# Patient Record
Sex: FEMALE | Race: WHITE | Employment: UNEMPLOYED | URBAN - METROPOLITAN AREA
[De-identification: names, ages, dates, MRNs, and addresses within clinical notes are randomized per-mention and may not be internally consistent; named-entity substitution may affect disease eponyms.]

---
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
## 2019-01-03 ENCOUNTER — OFFICE VISIT (OUTPATIENT)
Dept: FAMILY MEDICINE CLINIC | Age: 19
End: 2019-01-03
Payer: MEDICARE

## 2019-01-03 VITALS
WEIGHT: 182.2 LBS | TEMPERATURE: 98.3 F | HEART RATE: 103 BPM | HEIGHT: 62 IN | BODY MASS INDEX: 33.53 KG/M2 | SYSTOLIC BLOOD PRESSURE: 109 MMHG | OXYGEN SATURATION: 97 % | DIASTOLIC BLOOD PRESSURE: 75 MMHG

## 2019-01-03 DIAGNOSIS — Z11.4 ENCOUNTER FOR SCREENING FOR HIV: ICD-10-CM

## 2019-01-03 DIAGNOSIS — E66.9 OBESITY (BMI 30.0-34.9): ICD-10-CM

## 2019-01-03 DIAGNOSIS — J45.40 MODERATE PERSISTENT ASTHMA WITHOUT COMPLICATION: ICD-10-CM

## 2019-01-03 DIAGNOSIS — R63.5 UNINTENDED WEIGHT GAIN: ICD-10-CM

## 2019-01-03 DIAGNOSIS — R73.9 HYPERGLYCEMIA: ICD-10-CM

## 2019-01-03 DIAGNOSIS — R53.82 CHRONIC FATIGUE: Primary | ICD-10-CM

## 2019-01-03 DIAGNOSIS — F33.1 MODERATE EPISODE OF RECURRENT MAJOR DEPRESSIVE DISORDER (HCC): ICD-10-CM

## 2019-01-03 DIAGNOSIS — Z13.31 POSITIVE DEPRESSION SCREENING: ICD-10-CM

## 2019-01-03 LAB — HBA1C MFR BLD: 5 %

## 2019-01-03 PROCEDURE — G8427 DOCREV CUR MEDS BY ELIG CLIN: HCPCS | Performed by: FAMILY MEDICINE

## 2019-01-03 PROCEDURE — 99204 OFFICE O/P NEW MOD 45 MIN: CPT | Performed by: FAMILY MEDICINE

## 2019-01-03 PROCEDURE — 96160 PT-FOCUSED HLTH RISK ASSMT: CPT | Performed by: FAMILY MEDICINE

## 2019-01-03 PROCEDURE — 4004F PT TOBACCO SCREEN RCVD TLK: CPT | Performed by: FAMILY MEDICINE

## 2019-01-03 PROCEDURE — 83036 HEMOGLOBIN GLYCOSYLATED A1C: CPT | Performed by: FAMILY MEDICINE

## 2019-01-03 PROCEDURE — G8484 FLU IMMUNIZE NO ADMIN: HCPCS | Performed by: FAMILY MEDICINE

## 2019-01-03 PROCEDURE — G8431 POS CLIN DEPRES SCRN F/U DOC: HCPCS | Performed by: FAMILY MEDICINE

## 2019-01-03 PROCEDURE — G8417 CALC BMI ABV UP PARAM F/U: HCPCS | Performed by: FAMILY MEDICINE

## 2019-01-03 RX ORDER — FLUOXETINE 10 MG/1
10 TABLET, FILM COATED ORAL EVERY MORNING
Qty: 30 TABLET | Refills: 1 | Status: SHIPPED | OUTPATIENT
Start: 2019-01-03 | End: 2019-11-06

## 2019-01-03 RX ORDER — LANOLIN ALCOHOL/MO/W.PET/CERES
3-6 CREAM (GRAM) TOPICAL NIGHTLY PRN
Qty: 60 TABLET | Refills: 1 | Status: SHIPPED | OUTPATIENT
Start: 2019-01-03 | End: 2019-11-06

## 2019-01-03 RX ORDER — FLUTICASONE PROPIONATE 110 UG/1
2 AEROSOL, METERED RESPIRATORY (INHALATION) 2 TIMES DAILY
Qty: 1 INHALER | Refills: 3 | Status: SHIPPED | OUTPATIENT
Start: 2019-01-03 | End: 2021-11-17 | Stop reason: ALTCHOICE

## 2019-01-03 RX ORDER — ALBUTEROL SULFATE 90 UG/1
2 AEROSOL, METERED RESPIRATORY (INHALATION) EVERY 6 HOURS PRN
Qty: 8 G | Refills: 3 | Status: ON HOLD | OUTPATIENT
Start: 2019-01-03 | End: 2020-12-16 | Stop reason: ALTCHOICE

## 2019-01-03 ASSESSMENT — PATIENT HEALTH QUESTIONNAIRE - PHQ9
10. IF YOU CHECKED OFF ANY PROBLEMS, HOW DIFFICULT HAVE THESE PROBLEMS MADE IT FOR YOU TO DO YOUR WORK, TAKE CARE OF THINGS AT HOME, OR GET ALONG WITH OTHER PEOPLE: 2
6. FEELING BAD ABOUT YOURSELF - OR THAT YOU ARE A FAILURE OR HAVE LET YOURSELF OR YOUR FAMILY DOWN: 1
SUM OF ALL RESPONSES TO PHQ QUESTIONS 1-9: 9
3. TROUBLE FALLING OR STAYING ASLEEP: 2
SUM OF ALL RESPONSES TO PHQ9 QUESTIONS 1 & 2: 3
7. TROUBLE CONCENTRATING ON THINGS, SUCH AS READING THE NEWSPAPER OR WATCHING TELEVISION: 1
SUM OF ALL RESPONSES TO PHQ QUESTIONS 1-9: 9
9. THOUGHTS THAT YOU WOULD BE BETTER OFF DEAD, OR OF HURTING YOURSELF: 0
2. FEELING DOWN, DEPRESSED OR HOPELESS: 1
8. MOVING OR SPEAKING SO SLOWLY THAT OTHER PEOPLE COULD HAVE NOTICED. OR THE OPPOSITE, BEING SO FIGETY OR RESTLESS THAT YOU HAVE BEEN MOVING AROUND A LOT MORE THAN USUAL: 0
4. FEELING TIRED OR HAVING LITTLE ENERGY: 1
1. LITTLE INTEREST OR PLEASURE IN DOING THINGS: 2
5. POOR APPETITE OR OVEREATING: 1

## 2019-01-03 ASSESSMENT — ENCOUNTER SYMPTOMS
SHORTNESS OF BREATH: 1
DIARRHEA: 0
COUGH: 1
CHEST TIGHTNESS: 0
ABDOMINAL PAIN: 0
WHEEZING: 0
VOMITING: 0
NAUSEA: 0
CONSTIPATION: 0
ABDOMINAL DISTENTION: 0

## 2019-01-03 ASSESSMENT — ASTHMA QUESTIONNAIRES
QUESTION_2 LAST FOUR WEEKS HOW OFTEN HAVE YOU HAD SHORTNESS OF BREATH: 3
QUESTION_4 LAST FOUR WEEKS HOW OFTEN HAVE YOU USED YOUR RESCUE INHALER OR NEBULIZER MEDICATION (SUCH AS ALBUTEROL): 5
QUESTION_1 LAST FOUR WEEKS HOW MUCH OF THE TIME DID YOUR ASTHMA KEEP YOU FROM GETTING AS MUCH DONE AT WORK, SCHOOL OR AT HOME: 2
QUESTION_3 LAST FOUR WEEKS HOW OFTEN DID YOUR ASTHMA SYMPTOMS (WHEEZING, COUGHING, SHORTNESS OF BREATH, CHEST TIGHTNESS OR PAIN) WAKE YOU UP AT NIGHT OR EARLIER THAN USUAL IN THE MORNING: 1
ACT_TOTALSCORE: 13
QUESTION_5 LAST FOUR WEEKS HOW WOULD YOU RATE YOUR ASTHMA CONTROL: 2

## 2019-01-07 PROBLEM — J45.40 MODERATE PERSISTENT ASTHMA WITHOUT COMPLICATION: Status: ACTIVE | Noted: 2019-01-07

## 2019-01-07 PROBLEM — E66.811 OBESITY (BMI 30.0-34.9): Status: ACTIVE | Noted: 2019-01-07

## 2019-01-07 PROBLEM — E66.9 OBESITY (BMI 30.0-34.9): Status: ACTIVE | Noted: 2019-01-07

## 2019-01-07 PROBLEM — F33.1 MODERATE EPISODE OF RECURRENT MAJOR DEPRESSIVE DISORDER (HCC): Status: ACTIVE | Noted: 2019-01-07

## 2019-01-07 PROBLEM — R73.9 HYPERGLYCEMIA: Status: ACTIVE | Noted: 2019-01-07

## 2019-01-07 PROBLEM — R63.5 UNINTENDED WEIGHT GAIN: Status: ACTIVE | Noted: 2019-01-07

## 2019-01-07 PROBLEM — R53.82 CHRONIC FATIGUE: Status: ACTIVE | Noted: 2019-01-07

## 2019-01-07 ASSESSMENT — ENCOUNTER SYMPTOMS
SINUS PRESSURE: 0
SINUS PAIN: 0
RHINORRHEA: 0
BACK PAIN: 0

## 2019-09-05 ENCOUNTER — TELEPHONE (OUTPATIENT)
Dept: OBGYN CLINIC | Age: 19
End: 2019-09-05

## 2019-10-28 ENCOUNTER — HOSPITAL ENCOUNTER (OUTPATIENT)
Age: 19
Discharge: HOME | End: 2019-10-28
Payer: MEDICARE

## 2019-10-28 PROCEDURE — G0480 DRUG TEST DEF 1-7 CLASSES: HCPCS

## 2019-10-31 LAB
3-OH-COTININE URINE: <50 NG/ML
ANABASINE URINE: <3 NG/ML
COTININE, URINE: <5 NG/ML
NICOTINE URINE: <2 NG/ML
NORNICOTINE URINE: <2 NG/ML

## 2019-11-06 ENCOUNTER — OFFICE VISIT (OUTPATIENT)
Dept: OBGYN CLINIC | Age: 19
End: 2019-11-06
Payer: MEDICARE

## 2019-11-06 ENCOUNTER — HOSPITAL ENCOUNTER (OUTPATIENT)
Age: 19
Setting detail: SPECIMEN
Discharge: HOME | End: 2019-11-06
Payer: MEDICARE

## 2019-11-06 VITALS
RESPIRATION RATE: 16 BRPM | HEIGHT: 60 IN | SYSTOLIC BLOOD PRESSURE: 114 MMHG | WEIGHT: 181 LBS | DIASTOLIC BLOOD PRESSURE: 72 MMHG | BODY MASS INDEX: 35.53 KG/M2

## 2019-11-06 DIAGNOSIS — N94.6 DYSMENORRHEA: ICD-10-CM

## 2019-11-06 DIAGNOSIS — Z01.419 WOMEN'S ANNUAL ROUTINE GYNECOLOGICAL EXAMINATION: Primary | ICD-10-CM

## 2019-11-06 PROCEDURE — G8484 FLU IMMUNIZE NO ADMIN: HCPCS | Performed by: NURSE PRACTITIONER

## 2019-11-06 PROCEDURE — 99395 PREV VISIT EST AGE 18-39: CPT | Performed by: NURSE PRACTITIONER

## 2019-11-06 RX ORDER — DROSPIRENONE, ETHINYL ESTRADIOL AND LEVOMEFOLATE CALCIUM AND LEVOMEFOLATE CALCIUM 3-0.02(24)
1 KIT ORAL DAILY
Qty: 28 TABLET | Refills: 3 | Status: CANCELLED | OUTPATIENT
Start: 2019-11-06

## 2019-11-06 RX ORDER — LEVONORGESTREL AND ETHINYL ESTRADIOL 0.15-0.03
1 KIT ORAL DAILY
Qty: 91 TABLET | Refills: 1 | Status: SHIPPED | OUTPATIENT
Start: 2019-11-06 | End: 2019-11-07

## 2019-11-06 ASSESSMENT — ENCOUNTER SYMPTOMS
SHORTNESS OF BREATH: 0
ABDOMINAL PAIN: 0
DIARRHEA: 0
VOMITING: 0
BACK PAIN: 0
COUGH: 0
RHINORRHEA: 0
NAUSEA: 0
COLOR CHANGE: 0
CONSTIPATION: 0

## 2019-11-07 DIAGNOSIS — N94.6 DYSMENORRHEA: Primary | ICD-10-CM

## 2019-11-07 RX ORDER — LEVONORGESTREL AND ETHINYL ESTRADIOL 0.15-0.03
1 KIT ORAL DAILY
Qty: 1 PACKET | Refills: 1 | Status: SHIPPED | OUTPATIENT
Start: 2019-11-07 | End: 2020-10-05 | Stop reason: SDUPTHER

## 2019-11-11 PROBLEM — R87.612 LGSIL ON PAP SMEAR OF CERVIX: Status: ACTIVE | Noted: 2019-11-11

## 2019-11-11 LAB — CYTOLOGY REPORT: NORMAL

## 2019-11-12 ENCOUNTER — TELEPHONE (OUTPATIENT)
Dept: OBGYN CLINIC | Age: 19
End: 2019-11-12

## 2019-11-21 ENCOUNTER — TELEPHONE (OUTPATIENT)
Dept: OBGYN CLINIC | Age: 19
End: 2019-11-21

## 2019-11-29 ENCOUNTER — TELEPHONE (OUTPATIENT)
Dept: OBGYN CLINIC | Age: 19
End: 2019-11-29

## 2020-02-10 ENCOUNTER — HOSPITAL ENCOUNTER (EMERGENCY)
Age: 20
Discharge: HOME | End: 2020-02-10
Attending: EMERGENCY MEDICINE
Payer: MEDICARE

## 2020-02-10 ENCOUNTER — APPOINTMENT (OUTPATIENT)
Dept: CT IMAGING | Age: 20
End: 2020-02-10
Payer: MEDICARE

## 2020-02-10 VITALS
TEMPERATURE: 97.4 F | OXYGEN SATURATION: 100 % | SYSTOLIC BLOOD PRESSURE: 129 MMHG | BODY MASS INDEX: 34.41 KG/M2 | HEART RATE: 100 BPM | DIASTOLIC BLOOD PRESSURE: 90 MMHG | HEIGHT: 62 IN | WEIGHT: 187 LBS | RESPIRATION RATE: 20 BRPM

## 2020-02-10 LAB
ABSOLUTE EOS #: 0.12 K/UL (ref 0–0.44)
ABSOLUTE IMMATURE GRANULOCYTE: 0.03 K/UL (ref 0–0.3)
ABSOLUTE LYMPH #: 3.92 K/UL (ref 1.2–5.2)
ABSOLUTE MONO #: 0.56 K/UL (ref 0.1–1.4)
ANION GAP SERPL CALCULATED.3IONS-SCNC: 14 MMOL/L (ref 9–17)
APPEARANCE: CLEAR
BASOPHILS # BLD: 0 % (ref 0–2)
BASOPHILS ABSOLUTE: 0.03 K/UL (ref 0–0.2)
BILIRUBIN, POC: NEGATIVE
BLOOD URINE, POC: NEGATIVE
BUN BLDV-MCNC: 8 MG/DL (ref 6–20)
BUN/CREAT BLD: 14 (ref 9–20)
CALCIUM SERPL-MCNC: 9 MG/DL (ref 8.6–10.4)
CHLORIDE BLD-SCNC: 102 MMOL/L (ref 98–107)
CHP ED QC CHECK: YES
CHP ED QC CHECK: YES
CLARITY, POC: CLEAR
CO2: 21 MMOL/L (ref 20–31)
COLOR, POC: YELLOW
CREAT SERPL-MCNC: 0.59 MG/DL (ref 0.5–0.9)
DIFFERENTIAL TYPE: NORMAL
EOSINOPHILS RELATIVE PERCENT: 1 % (ref 1–4)
GFR AFRICAN AMERICAN: >60 ML/MIN
GFR NON-AFRICAN AMERICAN: >60 ML/MIN
GFR SERPL CREATININE-BSD FRML MDRD: NORMAL ML/MIN/{1.73_M2}
GFR SERPL CREATININE-BSD FRML MDRD: NORMAL ML/MIN/{1.73_M2}
GLUCOSE BLD-MCNC: 75 MG/DL (ref 70–99)
GLUCOSE URINE, POC: NEGATIVE
HCT VFR BLD CALC: 39.7 % (ref 36.3–47.1)
HEMOGLOBIN: 12.7 G/DL (ref 11.9–15.1)
IMMATURE GRANULOCYTES: 0 %
KETONES, POC: NEGATIVE
LEUKOCYTE EST, POC: NORMAL
LYMPHOCYTES # BLD: 38 % (ref 25–45)
MCH RBC QN AUTO: 27.7 PG (ref 25.2–33.5)
MCHC RBC AUTO-ENTMCNC: 32 G/DL (ref 28.4–34.8)
MCV RBC AUTO: 86.7 FL (ref 82.6–102.9)
MONOCYTES # BLD: 5 % (ref 2–8)
NITRITE, POC: NEGATIVE
NRBC AUTOMATED: 0 PER 100 WBC
PDW BLD-RTO: 13.1 % (ref 11.8–14.4)
PH, POC: 7
PLATELET # BLD: 307 K/UL (ref 138–453)
PLATELET ESTIMATE: NORMAL
PMV BLD AUTO: 11.1 FL (ref 8.1–13.5)
POTASSIUM SERPL-SCNC: 3.7 MMOL/L (ref 3.7–5.3)
PREGNANCY TEST URINE, POC: NEGATIVE
PROTEIN, POC: NEGATIVE
RBC # BLD: 4.58 M/UL (ref 3.95–5.11)
RBC # BLD: NORMAL 10*6/UL
SEG NEUTROPHILS: 56 % (ref 34–64)
SEGMENTED NEUTROPHILS ABSOLUTE COUNT: 5.63 K/UL (ref 1.8–8)
SODIUM BLD-SCNC: 137 MMOL/L (ref 135–144)
SPECIFIC GRAVITY, POC: 1.02
UROBILINOGEN, POC: 0.2
WBC # BLD: 10.3 K/UL (ref 4.5–13.5)
WBC # BLD: NORMAL 10*3/UL

## 2020-02-10 PROCEDURE — 81003 URINALYSIS AUTO W/O SCOPE: CPT

## 2020-02-10 PROCEDURE — 80048 BASIC METABOLIC PNL TOTAL CA: CPT

## 2020-02-10 PROCEDURE — 96374 THER/PROPH/DIAG INJ IV PUSH: CPT

## 2020-02-10 PROCEDURE — 6370000000 HC RX 637 (ALT 250 FOR IP): Performed by: EMERGENCY MEDICINE

## 2020-02-10 PROCEDURE — 81025 URINE PREGNANCY TEST: CPT

## 2020-02-10 PROCEDURE — 85025 COMPLETE CBC W/AUTO DIFF WBC: CPT

## 2020-02-10 PROCEDURE — 99284 EMERGENCY DEPT VISIT MOD MDM: CPT

## 2020-02-10 PROCEDURE — 74176 CT ABD & PELVIS W/O CONTRAST: CPT

## 2020-02-10 PROCEDURE — 6360000002 HC RX W HCPCS: Performed by: EMERGENCY MEDICINE

## 2020-02-10 RX ORDER — KETOROLAC TROMETHAMINE 30 MG/ML
30 INJECTION, SOLUTION INTRAMUSCULAR; INTRAVENOUS ONCE
Status: COMPLETED | OUTPATIENT
Start: 2020-02-10 | End: 2020-02-10

## 2020-02-10 RX ORDER — CYCLOBENZAPRINE HCL 10 MG
TABLET ORAL
Status: ON HOLD | COMMUNITY
Start: 2019-09-23 | End: 2020-12-16 | Stop reason: ALTCHOICE

## 2020-02-10 RX ORDER — POLYETHYLENE GLYCOL 3350 17 G/17G
17 POWDER, FOR SOLUTION ORAL DAILY
Qty: 1530 G | Refills: 1 | Status: SHIPPED | OUTPATIENT
Start: 2020-02-10 | End: 2020-03-11

## 2020-02-10 RX ORDER — COVID-19 ANTIGEN TEST
KIT MISCELLANEOUS
Status: ON HOLD | COMMUNITY
End: 2020-12-16 | Stop reason: ALTCHOICE

## 2020-02-10 RX ADMIN — MAGNESIUM CITRATE 296 ML: 1.75 LIQUID ORAL at 19:21

## 2020-02-10 RX ADMIN — KETOROLAC TROMETHAMINE 30 MG: 30 INJECTION, SOLUTION INTRAMUSCULAR at 19:21

## 2020-02-10 ASSESSMENT — PAIN DESCRIPTION - DESCRIPTORS: DESCRIPTORS: CRAMPING

## 2020-02-10 ASSESSMENT — PAIN DESCRIPTION - LOCATION: LOCATION: ABDOMEN

## 2020-02-10 ASSESSMENT — PAIN DESCRIPTION - PAIN TYPE: TYPE: ACUTE PAIN

## 2020-02-10 ASSESSMENT — PAIN SCALES - GENERAL
PAINLEVEL_OUTOF10: 9
PAINLEVEL_OUTOF10: 9

## 2020-02-10 NOTE — ED NOTES
Pt back to room for c/o abdominal pain and back pain x 2 days. Pt states she has bilateral lower abdominal pain (that is constant) and back pain on the right side near the ribs. Pt states she did not have her gallbladder removed nor dis she have her appendix removed. Pt states she does not have a history of kidney stones. Pt states she had 3 - 4 bowel movements yesterday (but they were small each time). Pt states she had a history of hemorrhoids and did have blood on her stool last week but none this week (pt states it was bright red). Pt is a & o x 4 and is able to walk with no issues. Pt's HR is tachy but otherwise regular, RR are even and unlabored. Pt's skin is warm, dry and appropriate for ethnicity. Pt's bowel sounds are hypoactive through all 4 quadrants. Pt's abdomen is soft, round and tender in right and left lower quadrant. Pt has rebound tenderness to right lower quadrant.        Montserrat Osorio RN  02/10/20 5449

## 2020-02-10 NOTE — ED PROVIDER NOTES
paternal grandfather is unknown. She indicated that the status of her maternal cousin is unknown. SOCIAL HISTORY       Social History     Tobacco Use    Smoking status: Current Some Day Smoker     Types: Cigarettes    Smokeless tobacco: Never Used   Substance Use Topics    Alcohol use: Not Currently     Comment: occaisonally    Drug use: No     PHYSICAL EXAM     INITIAL VITALS:   Vitals:    02/10/20 1711   BP: (!) 129/90   Pulse: 100   Resp: 20   Temp: 97.4 °F (36.3 °C)   TempSrc: Oral   SpO2: 100%   Weight: 187 lb (84.8 kg)   Height: 5' 2\" (1.575 m)       Physical Exam  Constitutional:       Appearance: She is well-developed. HENT:      Head: Normocephalic and atraumatic. Eyes:      Conjunctiva/sclera: Conjunctivae normal.   Neck:      Musculoskeletal: Normal range of motion and neck supple. Cardiovascular:      Rate and Rhythm: Normal rate and regular rhythm. Pulmonary:      Effort: Pulmonary effort is normal.      Breath sounds: Normal breath sounds. Abdominal:      Palpations: Abdomen is soft. There is no mass. Tenderness: There is abdominal tenderness. There is right CVA tenderness. Musculoskeletal: Normal range of motion. General: No tenderness or deformity. Skin:     General: Skin is warm and dry. Neurological:      Mental Status: She is alert and oriented to person, place, and time. MEDICAL DECISION MAKING:          CRITICAL CARE:       PROCEDURES:    Procedures    DIAGNOSTIC RESULTS   EKG:All EKG's are interpreted by the Emergency Department Physician who either signs or Co-signs this chart in the absence of a cardiologist.        RADIOLOGY:All plain film, CT, MRI, and formal ultrasound images (except ED bedside ultrasound) are read by the radiologist, see reports below, unless otherwisenoted in MDM or here. CT ABDOMEN PELVIS WO CONTRAST Additional Contrast? None   Preliminary Result   1. No obstructing calculus, hydronephrosis or hydroureter.    2. Normal

## 2020-02-11 LAB — HCG, PREGNANCY URINE (POC): NEGATIVE

## 2020-09-01 ENCOUNTER — APPOINTMENT (OUTPATIENT)
Dept: GENERAL RADIOLOGY | Age: 20
End: 2020-09-01
Payer: MEDICARE

## 2020-09-01 ENCOUNTER — HOSPITAL ENCOUNTER (EMERGENCY)
Age: 20
Discharge: HOME | End: 2020-09-01
Attending: EMERGENCY MEDICINE
Payer: MEDICARE

## 2020-09-01 VITALS
WEIGHT: 194 LBS | OXYGEN SATURATION: 96 % | BODY MASS INDEX: 35.7 KG/M2 | TEMPERATURE: 98.1 F | DIASTOLIC BLOOD PRESSURE: 79 MMHG | RESPIRATION RATE: 18 BRPM | SYSTOLIC BLOOD PRESSURE: 121 MMHG | HEART RATE: 90 BPM | HEIGHT: 62 IN

## 2020-09-01 PROCEDURE — 6370000000 HC RX 637 (ALT 250 FOR IP): Performed by: NURSE PRACTITIONER

## 2020-09-01 PROCEDURE — 99283 EMERGENCY DEPT VISIT LOW MDM: CPT

## 2020-09-01 PROCEDURE — 71045 X-RAY EXAM CHEST 1 VIEW: CPT

## 2020-09-01 RX ORDER — PREDNISONE 20 MG/1
20 TABLET ORAL 2 TIMES DAILY
Qty: 10 TABLET | Refills: 0 | Status: SHIPPED | OUTPATIENT
Start: 2020-09-01 | End: 2020-09-06

## 2020-09-01 RX ORDER — BENZONATATE 100 MG/1
100 CAPSULE ORAL 3 TIMES DAILY PRN
Qty: 30 CAPSULE | Refills: 0 | Status: SHIPPED | OUTPATIENT
Start: 2020-09-01 | End: 2020-09-08

## 2020-09-01 RX ORDER — PREDNISONE 20 MG/1
60 TABLET ORAL ONCE
Status: COMPLETED | OUTPATIENT
Start: 2020-09-01 | End: 2020-09-01

## 2020-09-01 RX ADMIN — PREDNISONE 60 MG: 20 TABLET ORAL at 13:01

## 2020-09-02 ENCOUNTER — CARE COORDINATION (OUTPATIENT)
Dept: CARE COORDINATION | Age: 20
End: 2020-09-02

## 2020-09-02 ASSESSMENT — ENCOUNTER SYMPTOMS
COUGH: 1
ABDOMINAL PAIN: 0
NAUSEA: 0
SINUS PRESSURE: 0
CONSTIPATION: 0
WHEEZING: 0
SORE THROAT: 0
DIARRHEA: 0
VOMITING: 0
SHORTNESS OF BREATH: 1
RHINORRHEA: 0
COLOR CHANGE: 0

## 2020-09-02 NOTE — CARE COORDINATION
changed medications related to discharge diagnosis     Patient/family/caregiver given information for GetWell Loop and agrees to enroll yes  Patient's preferred e-mail: [email or web address]   Patient's preferred phone number:[phone]  Based on Loop alert triggers, patient will be contacted by nurse care manager for worsening symptoms. Pt will be further monitored by COVID Loop Team based on severity of symptoms and risk factors.

## 2020-09-03 NOTE — ED PROVIDER NOTES
[address] [place] [address] ED  eMERGENCY dEPARTMENT eNCOUnter      Pt Name: Katharina Chris  MRN: [id number]  [place] 2000  Date of evaluation: 9/1/2020  Provider: Librado Cohn NP, LETY  Cindy 2406       Chief Complaint   Patient presents with    Cough     congestion, denies fever         HISTORY OF PRESENT ILLNESS  (Location/Symptom, Timing/Onset, Context/Setting, Quality, Duration, Modifying Factors, Severity.)   Katharina Chris is a 23 y.o. female who presents to the emergency department by private vehicle for evaluation of cough and congestion. Patient has a history of asthma. She states that she has had a cough and congestion. She denies any fevers or chills. She states she is been using her breathing treatments and inhalers as prescribed without any relief of the congestion. She denies any chest pain or shortness of breath. Denies nausea or vomiting      Nursing Notes were reviewed. ALLERGIES     Patient has no known allergies.     CURRENT MEDICATIONS       Discharge Medication List as of 9/1/2020  2:09 PM      CONTINUE these medications which have NOT CHANGED    Details   cyclobenzaprine (FLEXERIL) 10 MG tablet 1 tablet as neededHistorical Med      Naproxen Sodium (ALEVE) 220 MG CAPS Take by mouthHistorical Med      norethindrone-ethinyl estradiol ([address] 1/35) 1-35 MG-MCG per tablet Take 1 tablet by mouth daily, Disp-1 packet, R-2Normal      levonorgestrel-ethinyl estradiol (SEASONALE) 0.15-0.03 MG per tablet Take 1 tablet by mouth daily, Disp-1 packet, R-1Normal      fluticasone (FLOVENT HFA) 110 MCG/ACT inhaler Inhale 2 puffs into the lungs 2 times daily, Disp-1 Inhaler, R-3Normal      albuterol sulfate HFA (PROAIR HFA) 108 (90 Base) MCG/ACT inhaler Inhale 2 puffs into the lungs every 6 hours as needed for Wheezing or Shortness of Breath (cough), Disp-8 g, R-3Normal             PAST MEDICAL HISTORY         Diagnosis Date    Asthma     [facility] of [facility]       SURGICAL HISTORY           Procedure Laterality Date    EYE SURGERY      lazy eye         FAMILY HISTORY           Problem Relation Age of Onset    Diabetes Maternal Cousin     No Known Problems Mother     Anemia Father     Breast Cancer Paternal Grandmother     Lung Cancer Paternal Grandfather      Family Status   Relation Name Status    MCousin  (Not Specified)    Mother  (Not Specified)    Father  (Not Specified)    PGM  (Not Specified)    PGF  (Not Specified)        SOCIAL HISTORY      reports that she has been smoking cigarettes. She has never used smokeless tobacco. She reports previous alcohol use. She reports that she does not use drugs. REVIEW OF SYSTEMS    (2-9 systems for level 4, 10 or more for level 5)     Review of Systems   Constitutional: Negative for chills, fever and unexpected weight change. HENT: Negative for congestion, rhinorrhea, sinus pressure and sore throat. Respiratory: Positive for cough and shortness of breath. Negative for wheezing. Cardiovascular: Negative for chest pain and palpitations. Gastrointestinal: Negative for abdominal pain, constipation, diarrhea, nausea and vomiting. Genitourinary: Negative for dysuria and hematuria. Musculoskeletal: Negative for arthralgias and myalgias. Skin: Negative for color change and rash. Neurological: Negative for dizziness, weakness and headaches. Hematological: Negative for adenopathy. Except as noted above the remainder of the review of systems was reviewed and negative. PHYSICAL EXAM    (up to 7 for level 4, 8 or more for level 5)     ED Triage Vitals [09/01/20 1236]   BP Temp Temp Source Pulse Resp SpO2 Height Weight   121/79 98.1 °F (36.7 °C) Oral 90 18 96 % 5' 2\" (1.575 m) 194 lb (88 kg)       Physical Exam  Vitals signs reviewed. Constitutional:       Appearance: She is well-developed. HENT:      Head: Normocephalic and atraumatic.    Eyes: Conjunctiva/sclera: Conjunctivae normal.      Pupils: Pupils are equal, round, and reactive to light. Neck:      Musculoskeletal: Normal range of motion and neck supple. Cardiovascular:      Rate and Rhythm: Normal rate and regular rhythm. Pulmonary:      Effort: Pulmonary effort is normal. No respiratory distress. Breath sounds: No stridor. Wheezing present. Abdominal:      General: Bowel sounds are normal.      Palpations: Abdomen is soft. Musculoskeletal: Normal range of motion. Lymphadenopathy:      Cervical: No cervical adenopathy. Skin:     General: Skin is warm and dry. Findings: No rash. Neurological:      Mental Status: She is alert and oriented to person, place, and time. RADIOLOGY:   Non-plain film images such as CT, Ultrasound and MRI are read by the radiologist. Ivin Ducking radiographic images are visualized and preliminarily interpreted by the emergency physician with the below findings:    Xr Chest Portable    Result Date: 9/1/2020  EXAMINATION: ONE XRAY VIEW OF THE CHEST 9/1/2020 1:14 pm COMPARISON: None. HISTORY: ORDERING SYSTEM PROVIDED HISTORY: Chest Pain TECHNOLOGIST PROVIDED HISTORY: Chest Pain Reason for Exam: Chest Pain Acuity: Unknown Type of Exam: Unknown FINDINGS: Cardiomediastinal silhouette is within normal limits. No pulmonary venous congestion or edema. No focal lung consolidation or infiltrate. No pleural effusion or pneumothorax. Osseous structures are grossly intact. Negative portable chest exam     Interpretation per the Radiologist below, if available at the time of this note:    XR CHEST PORTABLE   Final Result   Negative portable chest exam                 LABS:  Labs Reviewed - No data to display    All other labs were within normal range or not returned as of this dictation.     EMERGENCY DEPARTMENT COURSE and DIFFERENTIAL DIAGNOSIS/MDM:   Vitals:    Vitals:    09/01/20 1236   BP: 121/79   Pulse: 90   Resp: 18   Temp: 98.1 °F (36.7 °C)   TempSrc: Oral   SpO2: 96%   Weight: 194 lb (88 kg)   Height: 5' 2\" (1.575 m)       Medical Decision Making: Patient feels better after steroids. Her chest x-ray is unremarkable. She stable to be discharged home on some oral prednisone and some Tessalon Perles for her cough. Follow-up with her primary care physician. Return for worsening symptoms or concerns. FINAL IMPRESSION      1.  Mild persistent asthma with exacerbation          DISPOSITION/PLAN   DISPOSITION Decision To Discharge 09/01/2020 02:08:33 PM      PATIENT REFERRED TO:   Angela Yarbrough MD  [facility] [address] [facility] [address] [facility]  [phone]    Schedule an appointment as soon as possible for a visit       [facility] ED  [address] [facility]  [phone]    If symptoms worsen      DISCHARGE MEDICATIONS:     Discharge Medication List as of 9/1/2020  2:09 PM      START taking these medications    Details   predniSONE (DELTASONE) 20 MG tablet Take 1 tablet by mouth 2 times daily for 5 days, Disp-10 tablet,R-0Print      benzonatate (TESSALON PERLES) 100 MG capsule Take 1 capsule by mouth 3 times daily as needed for Cough, Disp-30 capsule,R-0Print                 (Please note that portions of this note were completed with a voice recognition program.  Efforts were made to edit the dictations but occasionally words are mis-transcribed.)    [address] SANDRO, LETY - CNP  Certified Nurse Practitioner          LETY Choudhary CNP  09/02/20 8846

## 2020-10-02 ENCOUNTER — TELEPHONE (OUTPATIENT)
Dept: OBGYN CLINIC | Age: 20
End: 2020-10-02

## 2020-10-02 RX ORDER — NORETHINDRONE AND ETHINYL ESTRADIOL 1 MG-35MCG
1 KIT ORAL DAILY
Qty: 1 PACKET | Refills: 2 | Status: SHIPPED | OUTPATIENT
Start: 2020-10-02 | End: 2020-10-05

## 2020-10-02 NOTE — TELEPHONE ENCOUNTER
Needs a refill of her birth control sent to [facility] on [facility].  Out of them tomorrow

## 2020-10-05 ENCOUNTER — TELEPHONE (OUTPATIENT)
Dept: OBGYN CLINIC | Age: 20
End: 2020-10-05

## 2020-10-05 RX ORDER — LEVONORGESTREL AND ETHINYL ESTRADIOL 0.15-0.03
1 KIT ORAL DAILY
Qty: 1 PACKET | Refills: 0 | Status: SHIPPED
Start: 2020-10-05 | End: 2020-12-08 | Stop reason: SDUPTHER

## 2020-10-05 NOTE — TELEPHONE ENCOUNTER
Pt is on Introvale and we renewed the wrong one.   Please send current rx to [facility] on [facility]

## 2020-11-06 ENCOUNTER — HOSPITAL ENCOUNTER (OUTPATIENT)
Age: 20
Discharge: HOME | End: 2020-11-06
Payer: MEDICARE

## 2020-11-06 LAB
CORTISOL COLLECTION INFO: NORMAL
CORTISOL: 13.7 UG/DL (ref 2.7–18.4)
ESTIMATED AVERAGE GLUCOSE: 94 MG/DL
HBA1C MFR BLD: 4.9 % (ref 4–6)
INSULIN COMMENT: NORMAL
INSULIN REFERENCE RANGE:: NORMAL
INSULIN: 26.3 MU/L
TSH SERPL DL<=0.05 MIU/L-ACNC: 1.5 MIU/L (ref 0.3–5)

## 2020-11-06 PROCEDURE — 84443 ASSAY THYROID STIM HORMONE: CPT

## 2020-11-06 PROCEDURE — 82533 TOTAL CORTISOL: CPT

## 2020-11-06 PROCEDURE — 36415 COLL VENOUS BLD VENIPUNCTURE: CPT

## 2020-11-06 PROCEDURE — 83036 HEMOGLOBIN GLYCOSYLATED A1C: CPT

## 2020-11-06 PROCEDURE — 83525 ASSAY OF INSULIN: CPT

## 2020-12-08 ENCOUNTER — OFFICE VISIT (OUTPATIENT)
Dept: OBGYN CLINIC | Age: 20
End: 2020-12-08
Payer: MEDICARE

## 2020-12-08 ENCOUNTER — HOSPITAL ENCOUNTER (OUTPATIENT)
Age: 20
Setting detail: SPECIMEN
Discharge: HOME | End: 2020-12-08
Payer: MEDICARE

## 2020-12-08 VITALS
HEIGHT: 62 IN | WEIGHT: 192 LBS | SYSTOLIC BLOOD PRESSURE: 120 MMHG | BODY MASS INDEX: 35.33 KG/M2 | DIASTOLIC BLOOD PRESSURE: 68 MMHG

## 2020-12-08 PROCEDURE — G8484 FLU IMMUNIZE NO ADMIN: HCPCS | Performed by: NURSE PRACTITIONER

## 2020-12-08 PROCEDURE — 99395 PREV VISIT EST AGE 18-39: CPT | Performed by: NURSE PRACTITIONER

## 2020-12-08 RX ORDER — LEVONORGESTREL AND ETHINYL ESTRADIOL 0.15-0.03
1 KIT ORAL DAILY
Qty: 1 PACKET | Refills: 3 | Status: SHIPPED | OUTPATIENT
Start: 2020-12-08 | End: 2021-01-20

## 2020-12-08 ASSESSMENT — ENCOUNTER SYMPTOMS
SHORTNESS OF BREATH: 0
ABDOMINAL PAIN: 0
COLOR CHANGE: 0
COUGH: 0
NAUSEA: 0
DIARRHEA: 0
BACK PAIN: 0
VOMITING: 0
CONSTIPATION: 0
RHINORRHEA: 0

## 2020-12-08 NOTE — PROGRESS NOTES
Elissa Zheng is a 23 y.o.  here for her annual exam.  The patient was seen and examined. The patients past medical, surgical, social and family history were reviewed. Current medications and allergies were reviewed, and documented in the chart. She is currently in school for phlebotomy.      Exercise No  Diet Yes  Tobacco abuse No quit 3-4 weeks ago     Last PAP: 2019- LSIL had 1 PAP prior to that which was normal.   Family hx uterine or ovarian cancer-denies  , Family hx of breast cancer -PGM   family hx colon cancer -denies  HPV vaccine: Completed      Sexually active: yes - fiance same partner for over 1 year, Dyspareunia: No, Vaginal discharge: no,  UTI symptoms: no, voiding difficulties: no, bowels regular:Yes bloating:no      Menstrual history:  Menarche age- 14, cycle every 3  Months lasts around 7 days  Birth control: oral contraceptive, denies hx of blood clot or clotting disorder. Denies chest pain or pressure, SOB, calf pain or swelling, headaches, or vision changes.  Denies BTB       OB History    Para Term  AB Living   0 0 0 0 0 0   SAB TAB Ectopic Molar Multiple Live Births   0 0 0 0 0 0       Vitals:    20 1314   BP: 120/68   Site: Right Upper Arm   Position: Sitting   Cuff Size: Medium Adult   Weight: 192 lb (87.1 kg)   Height: 5' 2\" (1.575 m)       Wt Readings from Last 3 Encounters:   20 192 lb (87.1 kg)   20 194 lb (88 kg)   02/10/20 187 lb (84.8 kg)     Past Medical History:   Diagnosis Date    Asthma     Moderate episode of recurrent major depressive disorder ([facility] 2019                                                                   Past Surgical History:   Procedure Laterality Date    EYE SURGERY      lazy eye     Family History   Problem Relation Age of Onset    Diabetes Maternal Cousin     No Known Problems Mother     Anemia Father     Breast Cancer Paternal Grandmother     Lung Cancer Paternal Grandfather      Social History Tobacco Use   Smoking Status Current Some Day Smoker    Types: Cigarettes   Smokeless Tobacco Never Used     Social History     Substance and Sexual Activity   Alcohol Use Not Currently    Comment: occaisonally        Social History     Tobacco History     Smoking Status  Current Some Day Smoker Smoking Tobacco Type  Cigarettes    Smokeless Tobacco Use  Never Used          Alcohol History     Alcohol Use Status  Not Currently Comment  occaisonally          Drug Use     Drug Use Status  No          Sexual Activity     Sexually Active  Not Asked              No Known Allergies  Current Outpatient Medications   Medication Sig Dispense Refill    levonorgestrel-ethinyl estradiol (SEASONALE) 0.15-0.03 MG per tablet Take 1 tablet by mouth daily 1 packet 3    cyclobenzaprine (FLEXERIL) 10 MG tablet 1 tablet as needed      Naproxen Sodium (ALEVE) 220 MG CAPS Take by mouth      fluticasone (FLOVENT HFA) 110 MCG/ACT inhaler Inhale 2 puffs into the lungs 2 times daily 1 Inhaler 3    albuterol sulfate HFA (PROAIR HFA) 108 (90 Base) MCG/ACT inhaler Inhale 2 puffs into the lungs every 6 hours as needed for Wheezing or Shortness of Breath (cough) 8 g 3     No current facility-administered medications for this visit. Subjective:     Review of Systems   Constitutional: Negative for chills, fatigue, fever and unexpected weight change. HENT: Negative for congestion and rhinorrhea. Eyes: Negative for visual disturbance. Respiratory: Negative for cough and shortness of breath. Cardiovascular: Negative for chest pain, palpitations and leg swelling. Gastrointestinal: Negative for abdominal pain, constipation, diarrhea, nausea and vomiting. Endocrine: Negative for cold intolerance, heat intolerance, polydipsia and polyuria. Genitourinary: Negative for dyspareunia, dysuria, flank pain, menstrual problem, pelvic pain, vaginal bleeding, vaginal discharge and vaginal pain.    Musculoskeletal: Negative for back pain and myalgias. Skin: Negative for color change and rash. Neurological: Negative for dizziness, light-headedness and headaches. Hematological: Negative for adenopathy. Does not bruise/bleed easily. Psychiatric/Behavioral: Negative for self-injury and suicidal ideas. Objective:     Physical Exam  Vitals signs and nursing note reviewed. Constitutional:       General: She is not in acute distress. Appearance: She is well-developed. She is not diaphoretic. HENT:      Head: Normocephalic and atraumatic. Right Ear: External ear normal.      Left Ear: External ear normal.      Nose: Nose normal.   Eyes:      Pupils: Pupils are equal, round, and reactive to light. Neck:      Musculoskeletal: Normal range of motion and neck supple. Thyroid: No thyromegaly. Cardiovascular:      Rate and Rhythm: Normal rate and regular rhythm. Heart sounds: Normal heart sounds. No murmur. No friction rub. No gallop. Comments: No bilateral calf tenderness or swelling  Pulmonary:      Effort: Pulmonary effort is normal. No respiratory distress. Breath sounds: Normal breath sounds. No wheezing. Abdominal:      General: Bowel sounds are normal.      Palpations: Abdomen is soft. Tenderness: There is no abdominal tenderness. Genitourinary:     Comments: Breasts nipples everted, no masses or tenderness, does BSE  Vulva-no lesions  Vagina-pink rugated  Cervix-firm, 2 cm. Nontender, freely movable, no lesions  Uterus-ant. Smooth, firm, nontender, freely movable  Adnexa-no masses or tenderness   Musculoskeletal: Normal range of motion. Lymphadenopathy:      Cervical: No cervical adenopathy. Skin:     General: Skin is warm and dry. Findings: No rash. Neurological:      Mental Status: She is alert and oriented to person, place, and time. Cranial Nerves: No cranial nerve deficit. Deep Tendon Reflexes: Reflexes are normal and symmetric.    Psychiatric:         Behavior: Behavior normal.         Thought Content: Thought content normal.         Judgment: Judgment normal.       /68 (Site: Right Upper Arm, Position: Sitting, Cuff Size: Medium Adult)   Ht 5' 2\" (1.575 m)   Wt 192 lb (87.1 kg)   BMI 35.12 kg/m²     Assessment:       Diagnosis Orders   1. Encounter for annual routine gynecological examination  PAP SMEAR       Breast exam completed. Pelvic exam pap smear collected and sent. Cultures sent No    Plan:   Collect pap   BSE reviewed,  STD prevention reviewed  Gardisil counseling completed for all patients 9-25 yo  Cultures declined   BC- chc, bcp  DVT/ACHEs signs reviewed with patient. Refill medication if appropriate  Diet & Exercise reviewed with pt. Preventive  Health through PCP   RV prn/annual           Orders Placed This Encounter   Procedures    PAP SMEAR     Patient History:    No LMP recorded. OBGYN Status: Having periods  Past Surgical History:  No date: EYE SURGERY      Comment:  lazy eye  Medications/Contraceptives Affecting Cytology     Combination Contraceptives - Oral Disp Start End     levonorgestrel-ethinyl estradiol (SEASONALE) 0.15-0.03 MG per tablet    1 packet 10/5/2020     Sig: Take 1 tablet by mouth daily    Route: Oral       Social History    Tobacco Use      Smoking status: Current Some Day Smoker        Types: Cigarettes      Smokeless tobacco: Never Used       Standing Status:   Future     Standing Expiration Date:   12/9/2021     Order Specific Question:   Collection Type     Answer: Thin Prep     Order Specific Question:   Prior Abnormal Pap Test     Answer:   No     Order Specific Question:   Screening or Diagnostic     Answer:   Screening     Order Specific Question:   HPV Requested?      Answer:   Yes - If Abnormal Reflex HPV     Order Specific Question:   High Risk Patient     Answer:   N/A     Orders Placed This Encounter   Medications    levonorgestrel-ethinyl estradiol (SEASONALE) 0.15-0.03 MG per tablet     Sig: Take 1 tablet by mouth daily     Dispense:  1 packet     Refill:  3       Patient given educational materials - seepatient instructions. Discussed use, benefit, and side effects of prescribed medications. All patient questions answered. Pt voiced understanding. Reviewed health maintenance. Instructed to continue current medications, diet and exercise. Patient agreedwith treatment plan. Follow up as directed.       Electronically signed by LETY Watson CNP on 12/8/2020at 1:38 PM

## 2020-12-08 NOTE — PATIENT INSTRUCTIONS
Patient Education      Patient Education        Combination Birth Control Pills: Care Instructions  Your Care Instructions     Combination birth control pills are used to prevent pregnancy. They give you a regular dose of the hormones estrogen and progestin. You take a hormone pill every day to prevent pregnancy. Birth control pills come in packs. The most common type has 3 weeks of hormone pills. Some packs have sugar pills (they do not contain any hormones) for the fourth week. During that fourth no-hormone week, you have your period. After the fourth week (28 days), you start a new pack. Some birth control pills are packaged in different ways. For example, some have hormone pills for the fourth week instead of sugar pills. Taking hormones for the entire month causes you to not have periods or to have fewer periods. Others are packaged so that you have a period every 3 months. Your doctor will tell you what type of pills you have. Follow-up care is a key part of your treatment and safety. Be sure to make and go to all appointments, and call your doctor if you are having problems. It's also a good idea to know your test results and keep a list of the medicines you take. How can you care for yourself at home? How do you take the pill? · Follow your doctor's instructions about when to start taking your pills. Use backup birth control, such as a condom, or don't have intercourse for 7 days after you start your pills. · Take your pills every day, at about the same time of day. To help yourself do this, try to take them when you do something else every day, such as brushing your teeth. What if you forget to take a pill? Always read the label for specific instructions, or call your doctor. Here are some basic guidelines:  · If you miss 1 hormone pill, take it as soon as you remember. Ask your doctor if you may need to use a backup birth control method, such as a condom, or not have intercourse.   · If you miss 2 or more hormone pills, take one as soon as you remember you forgot them. Then read the pill label or call your doctor about instructions on how to take your missed pills. Use a backup method of birth control or don't have intercourse for 7 days. Pregnancy is more likely if you miss more than 1 pill. · If you had intercourse, you can use emergency contraception to help prevent pregnancy. The most effective emergency contraception is the copper IUD (inserted by a doctor). You can also get emergency contraceptive pills without a prescription at most [facility]. What else do you need to know? · The pill can have side effects. ? You may have very light or skipped periods. ? You may have bleeding between periods (spotting). This usually decreases after 3 to 4 months. ? You may have mood changes, less interest in sex, or weight gain. · The pill may reduce acne, heavy bleeding and cramping, and symptoms of premenstrual syndrome. · Check with your doctor before you use any other medicines, including over-the-counter medicines, vitamins, herbal products, and supplements. Birth control hormones may not work as well to prevent pregnancy when combined with other medicines. · The pill doesn't protect against sexually transmitted infection (STIs), such as herpes or HIV/AIDS. If you're not sure whether your sex partner might have an STI, use a condom to protect against disease. When should you call for help? Call your doctor now or seek immediate medical care if:    · You have severe belly pain.     · You have signs of a blood clot, such as:  ? Pain in your calf, back of the knee, thigh, or groin. ? Redness and swelling in your leg or groin.     · You have blurred vision or other problems seeing.     · You have a severe headache.     · You have severe trouble breathing.    Watch closely for changes in your health, and be sure to contact your doctor if:    · You think you might be pregnant.     · You think you may be depressed.     · You think you may have been exposed to or have a sexually transmitted infection. Where can you learn more? Go to https://chperutheweb.healthElemental [email or web address]. org and sign in to your DrFirst account. Enter G656 in the [facility] box to learn more about \"Combination Birth Control Pills: Care Instructions. \"     If you do not have an account, please click on the \"Sign Up Now\" link. Current as of: February 11, 2020               Content Version: 12.6  © 2006-2020 [employer]. Care instructions adapted under license by [facility] ([facility]). If you have questions about a medical condition or this instruction, always ask your healthcare professional. [address] any warranty or liability for your use of this information. Pap Test: Care Instructions  Your Care Instructions     The Pap test (also called a Pap smear) is a screening test for cancer of the cervix, which is the lower part of the uterus that opens into the vagina. The test can help your doctor find early changes in the cells that could lead to cancer. The sample of cells taken during your test has been sent to a lab so that an expert can look at the cells. It usually takes a week or two to get the results back. Follow-up care is a key part of your treatment and safety. Be sure to make and go to all appointments, and call your doctor if you are having problems. It's also a good idea to know your test results and keep a list of the medicines you take. What do the results mean? · A normal result means that the test did not find any abnormal cells in the sample. · An abnormal result can mean many things. Most of these are not cancer. The results of your test may be abnormal because:  ? You have an infection of the vagina or cervix, such as a yeast infection. ? You have an IUD (intrauterine device for birth control).   ? You have low estrogen levels after menopause that are causing the cells to change. ? You have cell changes that may be a sign of precancer or cancer. The results are ranked based on how serious the changes might be. There are many other reasons why you might not get a normal result. If the results were abnormal, you may need to get another test within a few weeks or months. If the results show changes that could be a sign of cancer, you may need a test called a colposcopy, which provides a more complete view of the cervix. Sometimes the lab cannot use the sample because it does not contain enough cells or was not preserved well. If so, you may need to have the test again. This is not common, but it does happen from time to time. When should you call for help? Watch closely for changes in your health, and be sure to contact your doctor if:    · You have vaginal bleeding or pain for more than 2 days after the test. It is normal to have a small amount of bleeding for a day or two after the test.   Where can you learn more? Go to https://Bestcake.c-crowd. org and sign in to your [employer] account. Enter N364 in the [employer] box to learn more about \"Pap Test: Care Instructions. \"     If you do not have an account, please click on the \"Sign Up Now\" link. Current as of: April 29, 2020               Content Version: 12.6  © 8975-5818 [employer]. Care instructions adapted under license by [facility] ([facility]). If you have questions about a medical condition or this instruction, always ask your healthcare professional. [place] [address] any warranty or liability for your use of this information.

## 2020-12-15 ENCOUNTER — HOSPITAL ENCOUNTER (EMERGENCY)
Age: 20
Discharge: ANOTHER HOSPITAL | End: 2020-12-16
Attending: EMERGENCY MEDICINE
Payer: MEDICARE

## 2020-12-15 ENCOUNTER — APPOINTMENT (OUTPATIENT)
Dept: CT IMAGING | Age: 20
End: 2020-12-15
Payer: MEDICARE

## 2020-12-15 PROBLEM — I60.9 SUBARACHNOID HEMORRHAGE (HCC): Status: ACTIVE | Noted: 2020-12-15

## 2020-12-15 LAB
ABSOLUTE EOS #: 0.12 K/UL (ref 0–0.44)
ABSOLUTE IMMATURE GRANULOCYTE: 0.03 K/UL (ref 0–0.3)
ABSOLUTE LYMPH #: 3.71 K/UL (ref 1.1–3.7)
ABSOLUTE MONO #: 0.36 K/UL (ref 0.1–1.4)
ANION GAP SERPL CALCULATED.3IONS-SCNC: 13 MMOL/L (ref 9–17)
BASOPHILS # BLD: 1 % (ref 0–2)
BASOPHILS ABSOLUTE: 0.04 K/UL (ref 0–0.2)
BUN BLDV-MCNC: 9 MG/DL (ref 6–20)
BUN/CREAT BLD: 13 (ref 9–20)
CALCIUM SERPL-MCNC: 9.3 MG/DL (ref 8.6–10.4)
CHLORIDE BLD-SCNC: 103 MMOL/L (ref 98–107)
CHP ED QC CHECK: NORMAL
CO2: 22 MMOL/L (ref 20–31)
CREAT SERPL-MCNC: 0.7 MG/DL (ref 0.5–0.9)
DIFFERENTIAL TYPE: ABNORMAL
EOSINOPHILS RELATIVE PERCENT: 1 % (ref 1–4)
GFR AFRICAN AMERICAN: >60 ML/MIN
GFR NON-AFRICAN AMERICAN: >60 ML/MIN
GFR SERPL CREATININE-BSD FRML MDRD: NORMAL ML/MIN/{1.73_M2}
GFR SERPL CREATININE-BSD FRML MDRD: NORMAL ML/MIN/{1.73_M2}
GLUCOSE BLD-MCNC: 98 MG/DL (ref 70–99)
HCT VFR BLD CALC: 41.4 % (ref 36.3–47.1)
HEMOGLOBIN: 13 G/DL (ref 11.9–15.1)
IMMATURE GRANULOCYTES: 0 %
INR BLD: 1.1
LYMPHOCYTES # BLD: 44 % (ref 25–45)
MCH RBC QN AUTO: 27.6 PG (ref 25.2–33.5)
MCHC RBC AUTO-ENTMCNC: 31.4 G/DL (ref 28.4–34.8)
MCV RBC AUTO: 87.9 FL (ref 82.6–102.9)
MONOCYTES # BLD: 4 % (ref 2–8)
NRBC AUTOMATED: 0 PER 100 WBC
PARTIAL THROMBOPLASTIN TIME: 29.9 SEC (ref 23.9–33.8)
PDW BLD-RTO: 12.8 % (ref 11.8–14.4)
PLATELET # BLD: 293 K/UL (ref 138–453)
PLATELET ESTIMATE: ABNORMAL
PMV BLD AUTO: 11.4 FL (ref 8.1–13.5)
POTASSIUM SERPL-SCNC: 3.9 MMOL/L (ref 3.7–5.3)
PREGNANCY TEST URINE, POC: NORMAL
PROTHROMBIN TIME: 13.8 SEC (ref 11.5–14.2)
RBC # BLD: 4.71 M/UL (ref 3.95–5.11)
RBC # BLD: ABNORMAL 10*6/UL
SEG NEUTROPHILS: 50 % (ref 34–64)
SEGMENTED NEUTROPHILS ABSOLUTE COUNT: 4.22 K/UL (ref 1.5–8.1)
SODIUM BLD-SCNC: 138 MMOL/L (ref 135–144)
WBC # BLD: 8.5 K/UL (ref 4.5–13.5)
WBC # BLD: ABNORMAL 10*3/UL

## 2020-12-15 PROCEDURE — 70496 CT ANGIOGRAPHY HEAD: CPT

## 2020-12-15 PROCEDURE — 6370000000 HC RX 637 (ALT 250 FOR IP): Performed by: NURSE PRACTITIONER

## 2020-12-15 PROCEDURE — 80048 BASIC METABOLIC PNL TOTAL CA: CPT

## 2020-12-15 PROCEDURE — 6360000002 HC RX W HCPCS: Performed by: NURSE PRACTITIONER

## 2020-12-15 PROCEDURE — 85730 THROMBOPLASTIN TIME PARTIAL: CPT

## 2020-12-15 PROCEDURE — 2580000003 HC RX 258: Performed by: EMERGENCY MEDICINE

## 2020-12-15 PROCEDURE — 2500000003 HC RX 250 WO HCPCS: Performed by: NURSE PRACTITIONER

## 2020-12-15 PROCEDURE — 6360000004 HC RX CONTRAST MEDICATION: Performed by: EMERGENCY MEDICINE

## 2020-12-15 PROCEDURE — 70450 CT HEAD/BRAIN W/O DYE: CPT

## 2020-12-15 PROCEDURE — 96365 THER/PROPH/DIAG IV INF INIT: CPT

## 2020-12-15 PROCEDURE — 99284 EMERGENCY DEPT VISIT MOD MDM: CPT

## 2020-12-15 PROCEDURE — 96372 THER/PROPH/DIAG INJ SC/IM: CPT

## 2020-12-15 PROCEDURE — 96368 THER/DIAG CONCURRENT INF: CPT

## 2020-12-15 PROCEDURE — 2580000003 HC RX 258: Performed by: NURSE PRACTITIONER

## 2020-12-15 PROCEDURE — 85025 COMPLETE CBC W/AUTO DIFF WBC: CPT

## 2020-12-15 PROCEDURE — 81025 URINE PREGNANCY TEST: CPT

## 2020-12-15 PROCEDURE — 96366 THER/PROPH/DIAG IV INF ADDON: CPT

## 2020-12-15 PROCEDURE — 85610 PROTHROMBIN TIME: CPT

## 2020-12-15 RX ORDER — KETOROLAC TROMETHAMINE 30 MG/ML
60 INJECTION, SOLUTION INTRAMUSCULAR; INTRAVENOUS ONCE
Status: COMPLETED | OUTPATIENT
Start: 2020-12-15 | End: 2020-12-15

## 2020-12-15 RX ORDER — PROMETHAZINE HYDROCHLORIDE 25 MG/ML
25 INJECTION, SOLUTION INTRAMUSCULAR; INTRAVENOUS ONCE
Status: COMPLETED | OUTPATIENT
Start: 2020-12-15 | End: 2020-12-15

## 2020-12-15 RX ORDER — DIPHENHYDRAMINE HYDROCHLORIDE 50 MG/ML
25 INJECTION INTRAMUSCULAR; INTRAVENOUS ONCE
Status: COMPLETED | OUTPATIENT
Start: 2020-12-15 | End: 2020-12-15

## 2020-12-15 RX ORDER — 0.9 % SODIUM CHLORIDE 0.9 %
80 INTRAVENOUS SOLUTION INTRAVENOUS ONCE
Status: COMPLETED | OUTPATIENT
Start: 2020-12-15 | End: 2020-12-15

## 2020-12-15 RX ORDER — SODIUM CHLORIDE 0.9 % (FLUSH) 0.9 %
10 SYRINGE (ML) INJECTION PRN
Status: DISCONTINUED | OUTPATIENT
Start: 2020-12-15 | End: 2020-12-16 | Stop reason: HOSPADM

## 2020-12-15 RX ORDER — NIMODIPINE 30 MG/1
60 CAPSULE, LIQUID FILLED ORAL ONCE
Status: COMPLETED | OUTPATIENT
Start: 2020-12-15 | End: 2020-12-15

## 2020-12-15 RX ADMIN — NIMODIPINE 60 MG: 30 CAPSULE ORAL at 23:27

## 2020-12-15 RX ADMIN — PHENYTOIN SODIUM 1000 MG: 50 INJECTION INTRAMUSCULAR; INTRAVENOUS at 23:45

## 2020-12-15 RX ADMIN — DEXTROSE MONOHYDRATE 3 MG/HR: 50 INJECTION, SOLUTION INTRAVENOUS at 23:26

## 2020-12-15 RX ADMIN — KETOROLAC TROMETHAMINE 60 MG: 30 INJECTION, SOLUTION INTRAMUSCULAR at 21:10

## 2020-12-15 RX ADMIN — IOPAMIDOL 75 ML: 755 INJECTION, SOLUTION INTRAVENOUS at 23:10

## 2020-12-15 RX ADMIN — Medication 10 ML: at 23:11

## 2020-12-15 RX ADMIN — DIPHENHYDRAMINE HYDROCHLORIDE 25 MG: 50 INJECTION, SOLUTION INTRAMUSCULAR; INTRAVENOUS at 21:13

## 2020-12-15 RX ADMIN — SODIUM CHLORIDE 80 ML: 9 INJECTION, SOLUTION INTRAVENOUS at 23:11

## 2020-12-15 RX ADMIN — PROMETHAZINE HYDROCHLORIDE 25 MG: 25 INJECTION INTRAMUSCULAR; INTRAVENOUS at 21:13

## 2020-12-15 ASSESSMENT — ENCOUNTER SYMPTOMS
ABDOMINAL PAIN: 0
COUGH: 0
SINUS PRESSURE: 0
CONSTIPATION: 0
COLOR CHANGE: 0
DIARRHEA: 0
NAUSEA: 0
VOMITING: 0
RHINORRHEA: 0
SHORTNESS OF BREATH: 0
WHEEZING: 0
SORE THROAT: 0

## 2020-12-15 ASSESSMENT — PAIN SCALES - GENERAL
PAINLEVEL_OUTOF10: 10
PAINLEVEL_OUTOF10: 10

## 2020-12-16 ENCOUNTER — ANESTHESIA (OUTPATIENT)
Dept: INTERVENTIONAL RADIOLOGY/VASCULAR | Age: 20
DRG: 030 | End: 2020-12-16
Payer: MEDICARE

## 2020-12-16 ENCOUNTER — APPOINTMENT (OUTPATIENT)
Dept: INTERVENTIONAL RADIOLOGY/VASCULAR | Age: 20
DRG: 030 | End: 2020-12-16
Attending: PSYCHIATRY & NEUROLOGY
Payer: MEDICARE

## 2020-12-16 ENCOUNTER — HOSPITAL ENCOUNTER (INPATIENT)
Age: 20
LOS: 3 days | Discharge: HOME | DRG: 030 | End: 2020-12-19
Attending: PSYCHIATRY & NEUROLOGY | Admitting: PSYCHIATRY & NEUROLOGY
Payer: MEDICARE

## 2020-12-16 ENCOUNTER — ANESTHESIA EVENT (OUTPATIENT)
Dept: INTERVENTIONAL RADIOLOGY/VASCULAR | Age: 20
DRG: 030 | End: 2020-12-16
Payer: MEDICARE

## 2020-12-16 VITALS
OXYGEN SATURATION: 99 % | TEMPERATURE: 98.3 F | SYSTOLIC BLOOD PRESSURE: 117 MMHG | HEIGHT: 62 IN | BODY MASS INDEX: 34.96 KG/M2 | RESPIRATION RATE: 16 BRPM | WEIGHT: 190 LBS | DIASTOLIC BLOOD PRESSURE: 74 MMHG | HEART RATE: 93 BPM

## 2020-12-16 VITALS — SYSTOLIC BLOOD PRESSURE: 95 MMHG | DIASTOLIC BLOOD PRESSURE: 55 MMHG | TEMPERATURE: 98.2 F | OXYGEN SATURATION: 99 %

## 2020-12-16 LAB
ACTIVATED CLOTTING TIME: 140 SEC (ref 79–149)
ANION GAP SERPL CALCULATED.3IONS-SCNC: 11 MMOL/L (ref 9–17)
BLOOD BANK SPECIMEN: NORMAL
BUN BLDV-MCNC: 6 MG/DL (ref 6–20)
BUN/CREAT BLD: ABNORMAL (ref 9–20)
CALCIUM SERPL-MCNC: 8.2 MG/DL (ref 8.6–10.4)
CHLORIDE BLD-SCNC: 107 MMOL/L (ref 98–107)
CO2: 17 MMOL/L (ref 20–31)
CREAT SERPL-MCNC: 0.45 MG/DL (ref 0.5–0.9)
GFR AFRICAN AMERICAN: >60 ML/MIN
GFR NON-AFRICAN AMERICAN: >60 ML/MIN
GFR SERPL CREATININE-BSD FRML MDRD: ABNORMAL ML/MIN/{1.73_M2}
GFR SERPL CREATININE-BSD FRML MDRD: ABNORMAL ML/MIN/{1.73_M2}
GLUCOSE BLD-MCNC: 101 MG/DL (ref 70–99)
GLUCOSE BLD-MCNC: 108 MG/DL (ref 65–105)
GLUCOSE BLD-MCNC: 78 MG/DL (ref 65–105)
GLUCOSE BLD-MCNC: 86 MG/DL (ref 65–105)
GLUCOSE BLD-MCNC: 87 MG/DL (ref 65–105)
HCG, PREGNANCY URINE (POC): NEGATIVE
HCT VFR BLD CALC: 38.2 % (ref 36.3–47.1)
HEMOGLOBIN: 12.4 G/DL (ref 11.9–15.1)
MCH RBC QN AUTO: 27.9 PG (ref 25.2–33.5)
MCHC RBC AUTO-ENTMCNC: 32.5 G/DL (ref 28.4–34.8)
MCV RBC AUTO: 85.8 FL (ref 82.6–102.9)
NRBC AUTOMATED: 0 PER 100 WBC
PDW BLD-RTO: 13 % (ref 11.8–14.4)
PLATELET # BLD: 266 K/UL (ref 138–453)
PMV BLD AUTO: 11.6 FL (ref 8.1–13.5)
POTASSIUM SERPL-SCNC: 3.6 MMOL/L (ref 3.7–5.3)
RBC # BLD: 4.45 M/UL (ref 3.95–5.11)
SARS-COV-2, RAPID: NOT DETECTED
SARS-COV-2: NORMAL
SARS-COV-2: NORMAL
SODIUM BLD-SCNC: 135 MMOL/L (ref 135–144)
SOURCE: NORMAL
WBC # BLD: 12 K/UL (ref 4.5–13.5)

## 2020-12-16 PROCEDURE — 6370000000 HC RX 637 (ALT 250 FOR IP): Performed by: STUDENT IN AN ORGANIZED HEALTH CARE EDUCATION/TRAINING PROGRAM

## 2020-12-16 PROCEDURE — 80048 BASIC METABOLIC PNL TOTAL CA: CPT

## 2020-12-16 PROCEDURE — 2000000003 HC NEURO ICU R&B

## 2020-12-16 PROCEDURE — 2709999900 HC NON-CHARGEABLE SUPPLY

## 2020-12-16 PROCEDURE — 2580000003 HC RX 258: Performed by: NURSE ANESTHETIST, CERTIFIED REGISTERED

## 2020-12-16 PROCEDURE — 99291 CRITICAL CARE FIRST HOUR: CPT | Performed by: PSYCHIATRY & NEUROLOGY

## 2020-12-16 PROCEDURE — C1769 GUIDE WIRE: HCPCS

## 2020-12-16 PROCEDURE — 3700000001 HC ADD 15 MINUTES (ANESTHESIA)

## 2020-12-16 PROCEDURE — C1887 CATHETER, GUIDING: HCPCS

## 2020-12-16 PROCEDURE — B41F1ZZ FLUOROSCOPY OF RIGHT LOWER EXTREMITY ARTERIES USING LOW OSMOLAR CONTRAST: ICD-10-PCS | Performed by: PSYCHIATRY & NEUROLOGY

## 2020-12-16 PROCEDURE — U0002 COVID-19 LAB TEST NON-CDC: HCPCS

## 2020-12-16 PROCEDURE — 2500000003 HC RX 250 WO HCPCS: Performed by: NURSE ANESTHETIST, CERTIFIED REGISTERED

## 2020-12-16 PROCEDURE — C1894 INTRO/SHEATH, NON-LASER: HCPCS

## 2020-12-16 PROCEDURE — 2500000003 HC RX 250 WO HCPCS: Performed by: STUDENT IN AN ORGANIZED HEALTH CARE EDUCATION/TRAINING PROGRAM

## 2020-12-16 PROCEDURE — 94640 AIRWAY INHALATION TREATMENT: CPT

## 2020-12-16 PROCEDURE — 99255 IP/OBS CONSLTJ NEW/EST HI 80: CPT | Performed by: PSYCHIATRY & NEUROLOGY

## 2020-12-16 PROCEDURE — 6360000002 HC RX W HCPCS: Performed by: STUDENT IN AN ORGANIZED HEALTH CARE EDUCATION/TRAINING PROGRAM

## 2020-12-16 PROCEDURE — 76377 3D RENDER W/INTRP POSTPROCES: CPT | Performed by: PSYCHIATRY & NEUROLOGY

## 2020-12-16 PROCEDURE — 99152 MOD SED SAME PHYS/QHP 5/>YRS: CPT | Performed by: PSYCHIATRY & NEUROLOGY

## 2020-12-16 PROCEDURE — 6360000002 HC RX W HCPCS: Performed by: PSYCHIATRY & NEUROLOGY

## 2020-12-16 PROCEDURE — B31F1ZZ FLUOROSCOPY OF LEFT VERTEBRAL ARTERY USING LOW OSMOLAR CONTRAST: ICD-10-PCS | Performed by: PSYCHIATRY & NEUROLOGY

## 2020-12-16 PROCEDURE — 83036 HEMOGLOBIN GLYCOSYLATED A1C: CPT

## 2020-12-16 PROCEDURE — 3700000000 HC ANESTHESIA ATTENDED CARE

## 2020-12-16 PROCEDURE — 82947 ASSAY GLUCOSE BLOOD QUANT: CPT

## 2020-12-16 PROCEDURE — 36620 INSERTION CATHETER ARTERY: CPT

## 2020-12-16 PROCEDURE — 86900 BLOOD TYPING SEROLOGIC ABO: CPT

## 2020-12-16 PROCEDURE — 36224 PLACE CATH CAROTD ART: CPT | Performed by: PSYCHIATRY & NEUROLOGY

## 2020-12-16 PROCEDURE — 85347 COAGULATION TIME ACTIVATED: CPT

## 2020-12-16 PROCEDURE — U0003 INFECTIOUS AGENT DETECTION BY NUCLEIC ACID (DNA OR RNA); SEVERE ACUTE RESPIRATORY SYNDROME CORONAVIRUS 2 (SARS-COV-2) (CORONAVIRUS DISEASE [COVID-19]), AMPLIFIED PROBE TECHNIQUE, MAKING USE OF HIGH THROUGHPUT TECHNOLOGIES AS DESCRIBED BY CMS-2020-01-R: HCPCS

## 2020-12-16 PROCEDURE — 6360000002 HC RX W HCPCS: Performed by: NURSE PRACTITIONER

## 2020-12-16 PROCEDURE — 87086 URINE CULTURE/COLONY COUNT: CPT

## 2020-12-16 PROCEDURE — 85027 COMPLETE CBC AUTOMATED: CPT

## 2020-12-16 PROCEDURE — 6360000004 HC RX CONTRAST MEDICATION: Performed by: PSYCHIATRY & NEUROLOGY

## 2020-12-16 PROCEDURE — 92523 SPEECH SOUND LANG COMPREHEN: CPT

## 2020-12-16 PROCEDURE — B3181ZZ FLUOROSCOPY OF BILATERAL INTERNAL CAROTID ARTERIES USING LOW OSMOLAR CONTRAST: ICD-10-PCS | Performed by: PSYCHIATRY & NEUROLOGY

## 2020-12-16 PROCEDURE — B3151ZZ FLUOROSCOPY OF BILATERAL COMMON CAROTID ARTERIES USING LOW OSMOLAR CONTRAST: ICD-10-PCS | Performed by: PSYCHIATRY & NEUROLOGY

## 2020-12-16 PROCEDURE — 86901 BLOOD TYPING SEROLOGIC RH(D): CPT

## 2020-12-16 PROCEDURE — 6360000002 HC RX W HCPCS: Performed by: NURSE ANESTHETIST, CERTIFIED REGISTERED

## 2020-12-16 PROCEDURE — 36226 PLACE CATH VERTEBRAL ART: CPT | Performed by: PSYCHIATRY & NEUROLOGY

## 2020-12-16 PROCEDURE — 36415 COLL VENOUS BLD VENIPUNCTURE: CPT

## 2020-12-16 PROCEDURE — 6370000000 HC RX 637 (ALT 250 FOR IP): Performed by: PSYCHIATRY & NEUROLOGY

## 2020-12-16 PROCEDURE — 86850 RBC ANTIBODY SCREEN: CPT

## 2020-12-16 PROCEDURE — 2580000003 HC RX 258: Performed by: STUDENT IN AN ORGANIZED HEALTH CARE EDUCATION/TRAINING PROGRAM

## 2020-12-16 RX ORDER — PHENYLEPHRINE HYDROCHLORIDE 10 MG/ML
INJECTION INTRAVENOUS PRN
Status: DISCONTINUED | OUTPATIENT
Start: 2020-12-16 | End: 2020-12-16 | Stop reason: SDUPTHER

## 2020-12-16 RX ORDER — ONDANSETRON 2 MG/ML
4 INJECTION INTRAMUSCULAR; INTRAVENOUS EVERY 6 HOURS PRN
Status: DISCONTINUED | OUTPATIENT
Start: 2020-12-16 | End: 2020-12-17

## 2020-12-16 RX ORDER — FENTANYL CITRATE 50 UG/ML
50 INJECTION, SOLUTION INTRAMUSCULAR; INTRAVENOUS EVERY 5 MIN PRN
Status: DISCONTINUED | OUTPATIENT
Start: 2020-12-16 | End: 2020-12-17

## 2020-12-16 RX ORDER — SERTRALINE HYDROCHLORIDE 25 MG/1
25 TABLET, FILM COATED ORAL NIGHTLY
Status: DISCONTINUED | OUTPATIENT
Start: 2020-12-16 | End: 2020-12-19 | Stop reason: HOSPADM

## 2020-12-16 RX ORDER — ACETAMINOPHEN 650 MG/1
650 SUPPOSITORY RECTAL EVERY 6 HOURS PRN
Status: DISCONTINUED | OUTPATIENT
Start: 2020-12-16 | End: 2020-12-19 | Stop reason: HOSPADM

## 2020-12-16 RX ORDER — ONDANSETRON 2 MG/ML
4 INJECTION INTRAMUSCULAR; INTRAVENOUS
Status: ACTIVE | OUTPATIENT
Start: 2020-12-16 | End: 2020-12-16

## 2020-12-16 RX ORDER — MONTELUKAST SODIUM 10 MG/1
10 TABLET ORAL NIGHTLY
COMMUNITY
End: 2021-11-15 | Stop reason: SDUPTHER

## 2020-12-16 RX ORDER — ACETAMINOPHEN 325 MG/1
650 TABLET ORAL EVERY 4 HOURS PRN
Status: DISCONTINUED | OUTPATIENT
Start: 2020-12-16 | End: 2020-12-17

## 2020-12-16 RX ORDER — SERTRALINE HYDROCHLORIDE 25 MG/1
25 TABLET, FILM COATED ORAL NIGHTLY
COMMUNITY
End: 2021-06-17

## 2020-12-16 RX ORDER — BUPROPION HYDROCHLORIDE 150 MG/1
150 TABLET ORAL NIGHTLY
COMMUNITY
End: 2021-08-03

## 2020-12-16 RX ORDER — FLUTICASONE PROPIONATE 110 UG/1
2 AEROSOL, METERED RESPIRATORY (INHALATION) 2 TIMES DAILY
Status: DISCONTINUED | OUTPATIENT
Start: 2020-12-16 | End: 2020-12-19 | Stop reason: HOSPADM

## 2020-12-16 RX ORDER — SENNA PLUS 8.6 MG/1
1 TABLET ORAL DAILY PRN
Status: DISCONTINUED | OUTPATIENT
Start: 2020-12-16 | End: 2020-12-19 | Stop reason: HOSPADM

## 2020-12-16 RX ORDER — DEXTROSE MONOHYDRATE 25 G/50ML
12.5 INJECTION, SOLUTION INTRAVENOUS PRN
Status: DISCONTINUED | OUTPATIENT
Start: 2020-12-16 | End: 2020-12-19 | Stop reason: HOSPADM

## 2020-12-16 RX ORDER — ACETAMINOPHEN 500 MG
1000 TABLET ORAL ONCE
Status: COMPLETED | OUTPATIENT
Start: 2020-12-16 | End: 2020-12-16

## 2020-12-16 RX ORDER — SODIUM CHLORIDE 9 MG/ML
INJECTION, SOLUTION INTRAVENOUS CONTINUOUS PRN
Status: DISCONTINUED | OUTPATIENT
Start: 2020-12-16 | End: 2020-12-16 | Stop reason: SDUPTHER

## 2020-12-16 RX ORDER — SODIUM CHLORIDE 0.9 % (FLUSH) 0.9 %
10 SYRINGE (ML) INJECTION EVERY 12 HOURS SCHEDULED
Status: DISCONTINUED | OUTPATIENT
Start: 2020-12-16 | End: 2020-12-19 | Stop reason: HOSPADM

## 2020-12-16 RX ORDER — SODIUM CHLORIDE 9 MG/ML
INJECTION, SOLUTION INTRAVENOUS CONTINUOUS
Status: DISCONTINUED | OUTPATIENT
Start: 2020-12-16 | End: 2020-12-18

## 2020-12-16 RX ORDER — PROMETHAZINE HYDROCHLORIDE 12.5 MG/1
12.5 TABLET ORAL EVERY 6 HOURS PRN
Status: DISCONTINUED | OUTPATIENT
Start: 2020-12-16 | End: 2020-12-17

## 2020-12-16 RX ORDER — BUPROPION HYDROCHLORIDE 75 MG/1
75 TABLET ORAL NIGHTLY
Status: DISCONTINUED | OUTPATIENT
Start: 2020-12-16 | End: 2020-12-19 | Stop reason: HOSPADM

## 2020-12-16 RX ORDER — LEVETIRACETAM 5 MG/ML
500 INJECTION INTRAVASCULAR ONCE
Status: COMPLETED | OUTPATIENT
Start: 2020-12-16 | End: 2020-12-16

## 2020-12-16 RX ORDER — LEVETIRACETAM 5 MG/ML
500 INJECTION INTRAVASCULAR EVERY 12 HOURS
Status: DISCONTINUED | OUTPATIENT
Start: 2020-12-16 | End: 2020-12-18

## 2020-12-16 RX ORDER — SODIUM CHLORIDE 9 MG/ML
INJECTION, SOLUTION INTRAVENOUS CONTINUOUS PRN
Status: DISCONTINUED | OUTPATIENT
Start: 2020-12-16 | End: 2020-12-16

## 2020-12-16 RX ORDER — CLOPIDOGREL 300 MG/1
600 TABLET, FILM COATED ORAL ONCE
Status: COMPLETED | OUTPATIENT
Start: 2020-12-16 | End: 2020-12-16

## 2020-12-16 RX ORDER — PROPOFOL 10 MG/ML
INJECTION, EMULSION INTRAVENOUS PRN
Status: DISCONTINUED | OUTPATIENT
Start: 2020-12-16 | End: 2020-12-16 | Stop reason: SDUPTHER

## 2020-12-16 RX ORDER — LORATADINE 10 MG/1
10 TABLET ORAL NIGHTLY
COMMUNITY
End: 2022-02-17 | Stop reason: SDUPTHER

## 2020-12-16 RX ORDER — ACETAMINOPHEN 325 MG/1
650 TABLET ORAL EVERY 6 HOURS PRN
Status: DISCONTINUED | OUTPATIENT
Start: 2020-12-16 | End: 2020-12-19 | Stop reason: HOSPADM

## 2020-12-16 RX ORDER — MIDAZOLAM HYDROCHLORIDE 1 MG/ML
INJECTION INTRAMUSCULAR; INTRAVENOUS PRN
Status: DISCONTINUED | OUTPATIENT
Start: 2020-12-16 | End: 2020-12-16 | Stop reason: SDUPTHER

## 2020-12-16 RX ORDER — FENTANYL CITRATE 50 UG/ML
25 INJECTION, SOLUTION INTRAMUSCULAR; INTRAVENOUS ONCE
Status: COMPLETED | OUTPATIENT
Start: 2020-12-16 | End: 2020-12-16

## 2020-12-16 RX ORDER — LIDOCAINE HYDROCHLORIDE 10 MG/ML
INJECTION, SOLUTION EPIDURAL; INFILTRATION; INTRACAUDAL; PERINEURAL PRN
Status: DISCONTINUED | OUTPATIENT
Start: 2020-12-16 | End: 2020-12-16 | Stop reason: SDUPTHER

## 2020-12-16 RX ORDER — DEXTROSE MONOHYDRATE 50 MG/ML
100 INJECTION, SOLUTION INTRAVENOUS PRN
Status: DISCONTINUED | OUTPATIENT
Start: 2020-12-16 | End: 2020-12-19 | Stop reason: HOSPADM

## 2020-12-16 RX ORDER — NICOTINE POLACRILEX 4 MG
15 LOZENGE BUCCAL PRN
Status: DISCONTINUED | OUTPATIENT
Start: 2020-12-16 | End: 2020-12-19 | Stop reason: HOSPADM

## 2020-12-16 RX ORDER — MONTELUKAST SODIUM 10 MG/1
10 TABLET ORAL NIGHTLY
Status: DISCONTINUED | OUTPATIENT
Start: 2020-12-16 | End: 2020-12-19 | Stop reason: HOSPADM

## 2020-12-16 RX ORDER — IODIXANOL 270 MG/ML
300 INJECTION, SOLUTION INTRAVASCULAR
Status: COMPLETED | OUTPATIENT
Start: 2020-12-16 | End: 2020-12-16

## 2020-12-16 RX ORDER — SODIUM CHLORIDE 0.9 % (FLUSH) 0.9 %
10 SYRINGE (ML) INJECTION PRN
Status: DISCONTINUED | OUTPATIENT
Start: 2020-12-16 | End: 2020-12-19 | Stop reason: HOSPADM

## 2020-12-16 RX ORDER — FENTANYL CITRATE 50 UG/ML
INJECTION, SOLUTION INTRAMUSCULAR; INTRAVENOUS PRN
Status: DISCONTINUED | OUTPATIENT
Start: 2020-12-16 | End: 2020-12-16 | Stop reason: SDUPTHER

## 2020-12-16 RX ORDER — LEVONORGESTREL AND ETHINYL ESTRADIOL 0.15-0.03
1 KIT ORAL DAILY
Status: DISCONTINUED | OUTPATIENT
Start: 2020-12-16 | End: 2020-12-19 | Stop reason: HOSPADM

## 2020-12-16 RX ORDER — MORPHINE SULFATE 2 MG/ML
2 INJECTION, SOLUTION INTRAMUSCULAR; INTRAVENOUS ONCE
Status: COMPLETED | OUTPATIENT
Start: 2020-12-16 | End: 2020-12-16

## 2020-12-16 RX ORDER — METFORMIN HYDROCHLORIDE 500 MG/1
500 TABLET, EXTENDED RELEASE ORAL
COMMUNITY
End: 2021-06-17

## 2020-12-16 RX ORDER — NIMODIPINE 30 MG/1
60 CAPSULE, LIQUID FILLED ORAL
Status: DISCONTINUED | OUTPATIENT
Start: 2020-12-16 | End: 2020-12-18

## 2020-12-16 RX ADMIN — FENTANYL CITRATE 25 MCG: 50 INJECTION, SOLUTION INTRAMUSCULAR; INTRAVENOUS at 08:40

## 2020-12-16 RX ADMIN — SODIUM CHLORIDE: 9 INJECTION, SOLUTION INTRAVENOUS at 08:20

## 2020-12-16 RX ADMIN — FENTANYL CITRATE 50 MCG: 50 INJECTION, SOLUTION INTRAMUSCULAR; INTRAVENOUS at 08:57

## 2020-12-16 RX ADMIN — LEVETIRACETAM 500 MG: 5 INJECTION INTRAVENOUS at 13:14

## 2020-12-16 RX ADMIN — IODIXANOL 38 ML: 270 INJECTION, SOLUTION INTRAVASCULAR at 10:01

## 2020-12-16 RX ADMIN — ACETAMINOPHEN 1000 MG: 325 TABLET ORAL at 05:08

## 2020-12-16 RX ADMIN — LIDOCAINE HYDROCHLORIDE 10 MG: 10 INJECTION, SOLUTION EPIDURAL; INFILTRATION; INTRACAUDAL; PERINEURAL at 08:25

## 2020-12-16 RX ADMIN — DEXTROSE MONOHYDRATE 7.5 MG/HR: 50 INJECTION, SOLUTION INTRAVENOUS at 16:28

## 2020-12-16 RX ADMIN — BUPROPION HYDROCHLORIDE 75 MG: 75 TABLET, FILM COATED ORAL at 21:22

## 2020-12-16 RX ADMIN — CLOPIDOGREL BISULFATE 600 MG: 300 TABLET, FILM COATED ORAL at 13:13

## 2020-12-16 RX ADMIN — NIMODIPINE 60 MG: 30 CAPSULE ORAL at 04:53

## 2020-12-16 RX ADMIN — MONTELUKAST SODIUM 10 MG: 10 TABLET, FILM COATED ORAL at 21:22

## 2020-12-16 RX ADMIN — FENTANYL CITRATE 50 MCG: 50 INJECTION, SOLUTION INTRAMUSCULAR; INTRAVENOUS at 08:15

## 2020-12-16 RX ADMIN — FENTANYL CITRATE 100 MCG: 50 INJECTION, SOLUTION INTRAMUSCULAR; INTRAVENOUS at 09:49

## 2020-12-16 RX ADMIN — NIMODIPINE 60 MG: 30 CAPSULE ORAL at 14:54

## 2020-12-16 RX ADMIN — SODIUM CHLORIDE: 9 INJECTION, SOLUTION INTRAVENOUS at 07:20

## 2020-12-16 RX ADMIN — PHENYLEPHRINE HYDROCHLORIDE 50 MCG: 10 INJECTION INTRAVENOUS at 08:30

## 2020-12-16 RX ADMIN — PHENYLEPHRINE HYDROCHLORIDE 50 MCG: 10 INJECTION INTRAVENOUS at 08:17

## 2020-12-16 RX ADMIN — PHENYLEPHRINE HYDROCHLORIDE 50 MCG: 10 INJECTION INTRAVENOUS at 08:25

## 2020-12-16 RX ADMIN — MORPHINE SULFATE 2 MG: 2 INJECTION, SOLUTION INTRAMUSCULAR; INTRAVENOUS at 21:22

## 2020-12-16 RX ADMIN — CEFAZOLIN SODIUM 2 G: 10 INJECTION, POWDER, FOR SOLUTION INTRAVENOUS at 08:51

## 2020-12-16 RX ADMIN — MIDAZOLAM HYDROCHLORIDE 2 MG: 1 INJECTION, SOLUTION INTRAMUSCULAR; INTRAVENOUS at 08:10

## 2020-12-16 RX ADMIN — FLUTICASONE PROPIONATE 2 PUFF: 110 AEROSOL, METERED RESPIRATORY (INHALATION) at 20:38

## 2020-12-16 RX ADMIN — PROPOFOL 25 MG: 10 INJECTION, EMULSION INTRAVENOUS at 08:15

## 2020-12-16 RX ADMIN — FENTANYL CITRATE 25 MCG: 50 INJECTION, SOLUTION INTRAMUSCULAR; INTRAVENOUS at 08:20

## 2020-12-16 RX ADMIN — SODIUM CHLORIDE: 9 INJECTION, SOLUTION INTRAVENOUS at 04:53

## 2020-12-16 RX ADMIN — ASPIRIN 325 MG: 325 TABLET, COATED ORAL at 13:13

## 2020-12-16 RX ADMIN — FENTANYL CITRATE 25 MCG: 50 INJECTION, SOLUTION INTRAMUSCULAR; INTRAVENOUS at 18:36

## 2020-12-16 RX ADMIN — DEXTROSE MONOHYDRATE 5 MG/HR: 50 INJECTION, SOLUTION INTRAVENOUS at 11:00

## 2020-12-16 RX ADMIN — LEVETIRACETAM 500 MG: 5 INJECTION INTRAVENOUS at 05:08

## 2020-12-16 RX ADMIN — LIDOCAINE HYDROCHLORIDE 20 MG: 10 INJECTION, SOLUTION EPIDURAL; INFILTRATION; INTRACAUDAL; PERINEURAL at 08:20

## 2020-12-16 RX ADMIN — DEXTROSE MONOHYDRATE 10 MG/HR: 50 INJECTION, SOLUTION INTRAVENOUS at 13:03

## 2020-12-16 RX ADMIN — NIMODIPINE 60 MG: 30 CAPSULE ORAL at 21:46

## 2020-12-16 RX ADMIN — PROPOFOL 25 MG: 10 INJECTION, EMULSION INTRAVENOUS at 08:40

## 2020-12-16 RX ADMIN — SERTRALINE 25 MG: 25 TABLET, FILM COATED ORAL at 22:40

## 2020-12-16 RX ADMIN — LEVETIRACETAM 500 MG: 5 INJECTION INTRAVENOUS at 21:22

## 2020-12-16 ASSESSMENT — PULMONARY FUNCTION TESTS
PIF_VALUE: 0
PIF_VALUE: 1
PIF_VALUE: 1
PIF_VALUE: 0
PIF_VALUE: 1
PIF_VALUE: 0
PIF_VALUE: 1
PIF_VALUE: 0
PIF_VALUE: 1
PIF_VALUE: 0
PIF_VALUE: 1
PIF_VALUE: 0
PIF_VALUE: 0
PIF_VALUE: 1
PIF_VALUE: 0
PIF_VALUE: 0
PIF_VALUE: 1
PIF_VALUE: 0
PIF_VALUE: 1
PIF_VALUE: 0
PIF_VALUE: 0
PIF_VALUE: 1
PIF_VALUE: 0
PIF_VALUE: 1
PIF_VALUE: 1
PIF_VALUE: 0
PIF_VALUE: 0
PIF_VALUE: 1
PIF_VALUE: 0
PIF_VALUE: 1
PIF_VALUE: 0
PIF_VALUE: 1
PIF_VALUE: 0
PIF_VALUE: 1
PIF_VALUE: 0
PIF_VALUE: 1
PIF_VALUE: 0
PIF_VALUE: 1
PIF_VALUE: 0
PIF_VALUE: 1
PIF_VALUE: 0
PIF_VALUE: 1
PIF_VALUE: 0
PIF_VALUE: 1
PIF_VALUE: 0
PIF_VALUE: 1
PIF_VALUE: 0
PIF_VALUE: 1
PIF_VALUE: 0
PIF_VALUE: 0
PIF_VALUE: 1
PIF_VALUE: 0

## 2020-12-16 ASSESSMENT — PAIN DESCRIPTION - DESCRIPTORS
DESCRIPTORS: THROBBING
DESCRIPTORS: THROBBING

## 2020-12-16 ASSESSMENT — PAIN DESCRIPTION - FREQUENCY
FREQUENCY: CONTINUOUS
FREQUENCY: CONTINUOUS

## 2020-12-16 ASSESSMENT — PAIN SCALES - GENERAL
PAINLEVEL_OUTOF10: 0
PAINLEVEL_OUTOF10: 10
PAINLEVEL_OUTOF10: 8
PAINLEVEL_OUTOF10: 6
PAINLEVEL_OUTOF10: 6
PAINLEVEL_OUTOF10: 10
PAINLEVEL_OUTOF10: 7
PAINLEVEL_OUTOF10: 6

## 2020-12-16 ASSESSMENT — PAIN DESCRIPTION - PAIN TYPE
TYPE: ACUTE PAIN
TYPE: CHRONIC PAIN

## 2020-12-16 ASSESSMENT — PAIN DESCRIPTION - LOCATION
LOCATION: HEAD
LOCATION: BACK

## 2020-12-16 ASSESSMENT — PAIN DESCRIPTION - ORIENTATION: ORIENTATION: MID;LOWER

## 2020-12-16 NOTE — SEDATION DOCUMENTATION
Vessel imaging reviewed.   Will keep Rt femoral sheath in for potential intervention tonight or tomorrow

## 2020-12-16 NOTE — PROGRESS NOTES
Speech Language Pathology  [facility] [address] [place], [place] [address]  Initial Speech/Language/Cognitive Assessment    NAME: Priya Barbosa  : 2000   MRN: [id number]  ADMISSION DATE: 2020  ADMITTING DIAGNOSIS: has Moderate episode of recurrent major depressive disorder ([address] Unintended weight gain; Obesity (BMI 30.0-34.9); Chronic fatigue; Moderate persistent asthma without complication; Hyperglycemia; LGSIL on Pap smear of cervix; and Subarachnoid hemorrhage ([address] on their problem list.    Date of Eval: 2020   Evaluating Therapist: RACHEL Nuñez    RECENT RESULTS  CT OF HEAD/MRI: 12/15/2020    Impression   Left internal carotid artery supraclinoid tortuosity, which simulated   appearance of aneurysm on noncontrast evaluation.       Otherwise, unremarkable CT angiogram of the head. Primary Complaint: The patient is a 23 y.o. female with history of major depressive disorder, asthma, hyperglycemia who presented to the neuro ICU as a transfer from [facility] emergency department due to concern for aneurysm. Patient states that she has had an ongoing headache over the past week. Gradual onset. Constant pain localized to right frontal region. Associated photophobia. No history of migraines in the past.  Mild dizziness/lightheadedness when standing. No neck pain, numbness, tingling, weakness, blurred vision, double vision.   Patient went to [facility] emergency department because she cannot sleep after 3 hours of laying in bed and unrelenting headache.    Therapy Time:   Individual Concurrent Group Co-treatment   Time In 1130         Time Out 1141         Minutes RACHEL Briceño  12/16/2020 11:49 AM

## 2020-12-16 NOTE — ANESTHESIA POSTPROCEDURE EVALUATION
Department of Anesthesiology  Postprocedure Note    Patient: Rigo Celis  MRN: [id number]  YOB: 2000  Date of evaluation: 12/16/2020  Time:  6:58 PM     Procedure Summary     Date: 12/16/20 Room / Location: [facility] Special Procedures    Anesthesia Start: 0800 Anesthesia Stop: 8643    Procedure: IR ANGIOGRAM CAROTID CEREBRAL BILATERAL Diagnosis: (aneurysm coiling with anesthesia)    Scheduled Providers:  Responsible Provider: Andreia Alvarenga MD    Anesthesia Type: general ASA Status: 4 - Emergent          Anesthesia Type: general    Mary Phase I:      Mary Phase II:      Last vitals: Reviewed and per EMR flowsheets.        Anesthesia Post Evaluation    Patient location during evaluation: PACU  Patient participation: complete - patient participated  Level of consciousness: awake  Pain score: 1  Airway patency: patent  Nausea & Vomiting: no nausea and no vomiting  Complications: no  Cardiovascular status: blood pressure returned to baseline  Respiratory status: acceptable  Hydration status: euvolemic

## 2020-12-16 NOTE — RESEARCH
[address]      Patient Name: Edward Telles  MRN: [id number]  YOB: 2000  Date of evaluation: 12/16/2020  Time of evaluation: 07:20 & 08:12  Reason for evaluation: Consent    Study: MATT: A Prospective, Multi-Center, Single Arm Study to obtain real world clinical data and characterize the acute and long term performance of the WAKEMED and GALAXY coils for the endovascular treatment of intracranial aneurysms. Protocol No: CNV_2017_01  NCT: [id number]  IRB: 2018-35  Site PI: Josh Santana MD     Writer was asked by Dr. Chetna Vance to evaluate Ewdard Telles for MATT: A Prospective, Multi-Center, Single Arm Study to obtain real world clinical data and characterize the acute and long term performance of the WAKEMED and GALAXY coils for the endovascular treatment of intracranial aneurysms. This writer met with the patient at her bedside in neuro ICU and again prior to anesthesia in the interventional suite to discuss to discuss participation in the [place]. The purpose of the [facility] mike [place] registry was explained to Chioma Harris by this writer as well as the risks, benefits,and alternatives to participation. The study consent including the imaging release section was reviewed with her by this writer and any questions were answered to her satisfaction. Chioma Harris  acknowledged understanding that her research participation is voluntary and that she may withdrawal without repercussion at any time. Edward Telles wishes to participate in the study if she meets inclusion criteria. Informed consent was signed by Edward Telles at 2708 on the 16th of December 2020. A copy of the informed consent will be provided to patient post procedure by this writer. The patient continues into screening until after the procedure today. Please contact the Research Nurse at [phone] or Dr. Chetna Vance  via [employer] with any questions. Get Santos RN Clinical Research Nurse  Clinical Research Services  [address] [place] [address], [address] [facility]  P: [phone]  F: [phone]

## 2020-12-16 NOTE — SEDATION DOCUMENTATION
Pre procedure assessment:  Pt awake/ alert/ oriented x 4. Pt denies discomfort denies disabilities at this time. Pupils PERRLA @ 4 mm. Face symmetric. Speech clear and appropriate. Pt moves all extremities x 4. Good pulse, motor, sensory x 4. Pedal pulses palpable and marked. NIH = 0 at this time.

## 2020-12-16 NOTE — ED PROVIDER NOTES
[address] [place] [address] ED  EMERGENCY DEPARTMENT ENCOUNTER   ATTENDING ATTESTATION     Pt Name: Delvis Roe  MRN: [id number]  [place] 2000  Date of evaluation: 12/15/20       Delvis  is a 23 y.o. female who presents with Migraine (since last week; photosensitivity; never been seen by neuro; eating and drinking okay)      MDM:     78-year-old female presents with complaints of a headache, the patient has evidence of an intracranial hemorrhage and possible intracranial aneurysm. The patient was discussed with the neuro interventional doctors at [place], they recommended multiple different medications including antiseizure medications and antivasospastic medications, the patient will be transferred for further evaluation. Vitals:   Vitals:    12/15/20 1949   BP: 136/75   Pulse: 103   Resp: 16   Temp: 98.3 °F (36.8 °C)   TempSrc: Oral   SpO2: 100%   Weight: 190 lb (86.2 kg)   Height: 5' 2\" (1.575 m)         I personally evaluated and examined the patient in conjunction with the Midlevel provider and agree with the assessment, treatment plan, and disposition of the patient as recorded by the midlevel. I performed a history and physical examination of the patient and discussed management with the midlevel. I reviewed the midlevels note and agree with the documented findings and plan of care. Any areas of disagreement are noted on the chart. I was personally present for the key portions of any procedures. I have documented in the chart those procedures where I was not present during the key portions. I have personally reviewed all images and agree with the midlevel's interpretation. I have reviewed the emergency nurses triage note. I agree with the chief complaint, past medical history, past surgical history, allergies, medications, social and family history as documented unless otherwise noted.     Yin Yusuf MD  Attending Emergency  Physician                  Chad Chirinos MD  12/15/20 2862

## 2020-12-16 NOTE — PLAN OF CARE
Problem: Pain:  Goal: Pain level will decrease  Description: Pain level will decrease  Outcome: Ongoing  Goal: Control of acute pain  Description: Control of acute pain  Outcome: Ongoing  Goal: Control of chronic pain  Description: Control of chronic pain  Outcome: Ongoing     Problem: Discharge Planning:  Goal: Discharged to appropriate level of care  Description: Discharged to appropriate level of care  Outcome: Ongoing     Problem: Autonomic Dysreflexia - Risk of:  Goal: Absence of autonomic dysreflexia signs and symptoms  Description: Absence of autonomic dysreflexia signs and symptoms  Outcome: Ongoing     Problem: Psychosocial Distress:  Goal: Absence of psychosocial distress  Description: Absence of psychosocial distress  Outcome: Ongoing  Goal: Ability to cope will improve  Description: Ability to cope will improve  Outcome: Ongoing  Goal: Ability to identify and utilize available support systems will improve  Description: Ability to identify and utilize available support systems will improve  Outcome: Ongoing     Problem: Bleeding:  Goal: Will show no signs and symptoms of excessive bleeding  Description: Will show no signs and symptoms of excessive bleeding  Outcome: Ongoing

## 2020-12-16 NOTE — ED PROVIDER NOTES
[address] [place] [address] ED  eMERGENCY dEPARTMENT eNCOUnter      Pt Name: Jerome Olsen  MRN: [id number]  [place] 2000  Date of evaluation: 12/15/2020  Provider: Mary Diaz NP, LETY White 4730       Chief Complaint   Patient presents with    Headache     since last week; photosensitivity; never been seen by neuro; eating and drinking okay         HISTORY OF PRESENT ILLNESS  (Location/Symptom, Timing/Onset, Context/Setting, Quality, Duration, Modifying Factors, Severity.)   Jerome Olsen is a 23 y.o. female who presents to the emergency department by private vehicle for evaluation of. Patient states that she has had a headache for the last 1 week. She states that the headache is in the front side of her head. She describes it as a throbbing pain. She states it has been there for the last 1 week. She denies any history of headaches in the past.  She has been taking over-the-counter Motrin and headache medication without any improvement of her symptoms. She denies any associated nausea or vomiting. She denies any fevers or chills. She denies any chest pain or shortness of breath. Denies any visual changes except for some photophobia. Nursing Notes were reviewed.     ALLERGIES     Latex    CURRENT MEDICATIONS       Previous Medications    ALBUTEROL SULFATE HFA (PROAIR HFA) 108 (90 BASE) MCG/ACT INHALER    Inhale 2 puffs into the lungs every 6 hours as needed for Wheezing or Shortness of Breath (cough)    CYCLOBENZAPRINE (FLEXERIL) 10 MG TABLET    1 tablet as needed    FLUTICASONE (FLOVENT HFA) 110 MCG/ACT INHALER    Inhale 2 puffs into the lungs 2 times daily    LEVONORGESTREL-ETHINYL ESTRADIOL (SEASONALE) 0.15-0.03 MG PER TABLET    Take 1 tablet by mouth daily    NAPROXEN SODIUM (ALEVE) 220 MG CAPS    Take by mouth       PAST MEDICAL HISTORY         Diagnosis Date    Asthma     Moderate episode of recurrent major depressive disorder ([facility] [address] 1/7/2019       SURGICAL HISTORY Procedure Laterality Date    EYE SURGERY      lazy eye         FAMILY HISTORY           Problem Relation Age of Onset    Diabetes Maternal Cousin     No Known Problems Mother     Anemia Father     Breast Cancer Paternal Grandmother     Lung Cancer Paternal Grandfather      Family Status   Relation Name Status    MCousin  (Not Specified)    Mother  (Not Specified)    Father  (Not Specified)    PGM  (Not Specified)    PGF  (Not Specified)        SOCIAL HISTORY      reports that she has been smoking cigarettes. She has never used smokeless tobacco. She reports previous alcohol use. She reports that she does not use drugs. REVIEW OF SYSTEMS    (2-9 systems for level 4, 10 or more for level 5)     Review of Systems   Constitutional: Negative for chills, fever and unexpected weight change. HENT: Negative for congestion, rhinorrhea, sinus pressure and sore throat. Respiratory: Negative for cough, shortness of breath and wheezing. Cardiovascular: Negative for chest pain and palpitations. Gastrointestinal: Negative for abdominal pain, constipation, diarrhea, nausea and vomiting. Genitourinary: Negative for dysuria and hematuria. Musculoskeletal: Negative for arthralgias and myalgias. Skin: Negative for color change and rash. Neurological: Negative for dizziness, weakness and headaches. Hematological: Negative for adenopathy. All other systems reviewed and are negative. Except as noted above the remainder of the review of systems was reviewed and negative. PHYSICAL EXAM    (up to 7 for level 4, 8 or more for level 5)     ED Triage Vitals [12/15/20 1949]   BP Temp Temp Source Pulse Resp SpO2 Height Weight   136/75 98.3 °F (36.8 °C) Oral 103 16 100 % 5' 2\" (1.575 m) 190 lb (86.2 kg)       Physical Exam  Vitals signs reviewed. Constitutional:       Appearance: She is well-developed. HENT:      Head: Normocephalic and atraumatic.    Eyes:      Conjunctiva/sclera: Conjunctivae normal.      Pupils: Pupils are equal, round, and reactive to light. Neck:      Musculoskeletal: Normal range of motion and neck supple. Cardiovascular:      Rate and Rhythm: Normal rate and regular rhythm. Pulmonary:      Effort: Pulmonary effort is normal. No respiratory distress. Breath sounds: Normal breath sounds. No stridor. Abdominal:      General: Bowel sounds are normal.      Palpations: Abdomen is soft. Musculoskeletal: Normal range of motion. Lymphadenopathy:      Cervical: No cervical adenopathy. Skin:     General: Skin is warm and dry. Findings: No rash. Neurological:      Mental Status: She is alert and oriented to person, place, and time. DIAGNOSTIC RESULTS       RADIOLOGY:   Non-plain film images such as CT, Ultrasound and MRI are read by the radiologist. Plain radiographic images are visualized and preliminarily interpreted by the emergency physician with the below findings:        Interpretation per the Radiologist below, if available at the time of this note:            LABS:  Labs Reviewed   CBC WITH AUTO DIFFERENTIAL - Abnormal; Notable for the following components:       Result Value    Absolute Lymph # 3.71 (*)     All other components within normal limits   POCT URINE PREGNANCY - Normal   BASIC METABOLIC PANEL   PROTIME-INR   APTT       All other labs were within normal range or not returned as of this dictation. EMERGENCY DEPARTMENT COURSE and DIFFERENTIAL DIAGNOSIS/MDM:   Vitals:    Vitals:    12/15/20 2332 12/15/20 2337 12/15/20 2342 12/15/20 2347   BP: 135/84 124/84 (!) 123/90 122/73   Pulse: 94 94 98 97   Resp:       Temp:       TempSrc:       SpO2: 100%      Weight:       Height:           Medical Decision Makin- I spoke with Dr Rae Wing at [facility] Dr ferrara from neuro endo, recommends that we give the patient a gram of Dilantin IV.   He also recommends that we start her on nicardipine to keep her systolic blood pressure between 85-1 20.  He also would like her to have a dose of Nimodpine 60 mg oral. Awaiting call from neuro ICU admitting doctor. Pt is accepted/ She was started on medications requested by Dr Curt Oh. Medications   sodium chloride flush 0.9 % injection 10 mL (10 mLs Intravenous Given 12/15/20 2311)   phenytoin (DILANTIN) 1,000 mg in sodium chloride 0.9 % 100 mL IVPB (loading dose) (1,000 mg Intravenous New Bag 12/15/20 2345)   niCARdipine (CARDENE) 50 mg in dextrose 5 % 250 mL infusion (3 mg/hr Intravenous New Bag 12/15/20 2326)   ketorolac (TORADOL) injection 60 mg (60 mg Intramuscular Given 12/15/20 2110)   diphenhydrAMINE (BENADRYL) injection 25 mg (25 mg Intramuscular Given 12/15/20 2113)   promethazine (PHENERGAN) injection 25 mg (25 mg Intramuscular Given 12/15/20 2113)   0.9 % sodium chloride bolus (0 mLs Intravenous Stopped 12/15/20 2312)   iopamidol (ISOVUE-370) 76 % injection 75 mL (75 mLs Intravenous Given 12/15/20 2310)   niMODipine (NIMOTOP) capsule 60 mg (60 mg Oral Given 12/15/20 2327)     CRITICAL CARE TIME     Due to the high probability of sudden and clinically significant deterioration in the patient's condition she required highest level of my preparedness to intervene urgently. I provided critical care time including documentation time, medication orders and management, reevaluation, vital sign assessment, ordering and reviewing of of lab tests ordering and reviewing of x-ray studies, and admission orders. Aggregate critical care time is 34 minutes including only time during which I was engaged in work directly related to her care and did not include time spent treating other patients simultaneously. FINAL IMPRESSION      1. Subarachnoid hemorrhage [facility])          DISPOSITION/PLAN   DISPOSITION Decision To Transfer 12/15/2020 11:51:15 PM      PATIENT REFERRED TO:   No follow-up provider specified.     DISCHARGE MEDICATIONS:     New Prescriptions    No medications on file           (Please note that portions of this note were completed with a voice recognition program.  Efforts were made to edit the dictations but occasionally words are mis-transcribed.)    [address] [facility] NP, APRN - CNP  Certified Nurse Practitioner          LETY Arzola - CNP  12/15/20 [facility], LETY - PAM  12/15/20 0109

## 2020-12-16 NOTE — CONSULTS
Endovascular Neurosurgery Consult      Reason for evaluation: sah, possible L ICA aneurysm    SUBJECTIVE:   History of Chief Complaint:    16yo female with pmh of obesity, depression, asthma. Pt presents with 1x week of new headaches, imaging shows possible prepontine sah and L intracranial calcified ICA aneurysm vs. tortuous ICA. Pt presented with 1 week of headache. Pt reports she does not typically have headaches. Associated sx included photophobia, dizziness. Pt had migraine cocktail which resolved the pain. Allergies  is allergic to latex. Medications  Prior to Admission medications    Medication Sig Start Date End Date Taking?  Authorizing Provider   metFORMIN (GLUCOPHAGE-XR) 500 MG extended release tablet Take 500 mg by mouth daily (with breakfast)   Yes Historical Provider, MD   montelukast (SINGULAIR) 10 MG tablet Take 10 mg by mouth nightly   Yes Historical Provider, MD   sertraline (ZOLOFT) 25 MG tablet Take 25 mg by mouth nightly   Yes Historical Provider, MD   buPROPion (WELLBUTRIN XL) 150 MG extended release tablet Take 75 mg by mouth nightly   Yes Historical Provider, MD   loratadine (CLARITIN) 10 MG tablet Take 10 mg by mouth nightly   Yes Historical Provider, MD   levonorgestrel-ethinyl estradiol (SEASONALE) 0.15-0.03 MG per tablet Take 1 tablet by mouth daily 12/8/20   LETY Estrella - CNP   fluticasone (FLOVENT HFA) 110 MCG/ACT inhaler Inhale 2 puffs into the lungs 2 times daily 1/3/19 1/3/20  Vane Chaudhry MD    Scheduled Meds:   sodium chloride flush  10 mL Intravenous 2 times per day    niMODipine  60 mg Oral 6 times per day    buPROPion  75 mg Oral Nightly    fluticasone  2 puff Inhalation BID    levonorgestrel-ethinyl estradiol  1 tablet Oral Daily    montelukast  10 mg Oral Nightly    sertraline  25 mg Oral Nightly    insulin lispro  0-6 Units Subcutaneous TID WC    insulin lispro  0-3 Units Subcutaneous Nightly     Continuous Infusions:  sodium chloride 75 mL/hr at 12/16/20 0453    niCARdipine      dextrose       PRN Meds:.sodium chloride flush, promethazine **OR** ondansetron, acetaminophen **OR** acetaminophen, senna, glucose, dextrose, glucagon (rDNA), dextrose  Past Medical History   has a past medical history of Asthma and Moderate episode of recurrent major depressive disorder ([facility] [address] Past Surgical History   has a past surgical history that includes Eye surgery. Social History   reports that she has been smoking cigarettes. She has never used smokeless tobacco.   reports previous alcohol use. reports no history of drug use. Family History  family history includes Anemia in her father; Breast Cancer in her paternal grandmother; Diabetes in her maternal cousin; Sherlie [place] in her paternal grandfather; No Known Problems in her mother. Review of Systems:  CONSTITUTIONAL:  negative for fevers, chills, fatigue and malaise    EYES:  negative for double vision, blurred vision and photophobia     HEENT:  negative for tinnitus, epistaxis and sore throat    RESPIRATORY:  negative for cough, shortness of breath, wheezing    CARDIOVASCULAR:  negative for chest pain, palpitations, syncope, edema    GASTROINTESTINAL:  negative for nausea, vomiting    GENITOURINARY:  negative for incontinence    MUSCULOSKELETAL:  negative for neck or back pain    NEUROLOGICAL:  Negative for weakness and tingling  negative for headaches and dizziness    PSYCHIATRIC:  negative for anxiety      Review of systems otherwise negative. OBJECTIVE:     Vitals:    12/16/20 0630   BP: (!) 97/57   Pulse: 93   Resp: 17   Temp:    SpO2: 95%        General:  Gen: normal habitus, NAD  HEENT: NCAT, mucosa moist  Cvs: RRR, S1 S2 normal  Resp: symmetric unlabored breathing  Abd: s/nd/nt  Ext: no edema  Skin: no lesions seen, warm and dry    Neuro:  Gen: awake and alert, oriented x3. Lang/speech: no aphasia or dysarthria. Follows commands. CN: PERRL, EOMI, VFF, V1-3 intact, face symmetric, hearing intact, shoulder shrug symmetric, tongue midline  Motor: grossly 5/5 UE and LE b/l  Sense: LT intact in all 4 ext. Coord: FTN and HTS intact b/l  DTR: deferred  Gait: narrow base gait    NIH Stroke Scale:   1a  Level of consciousness: 0 - alert; keenly responsive   1b. LOC questions:  0 - answers both questions correctly   1c. LOC commands: 0 - performs both tasks correctly   2. Best Gaze: 0 - normal   3. Visual: 0 - no visual loss   4. Facial Palsy: 0 - normal symmetric movement   5a. Motor left arm: 0 - no drift, limb holds 90 (or 45) degrees for full 10 seconds   5b. Motor right arm: 0 - no drift, limb holds 90 (or 45) degrees for full 10 seconds   6a. Motor left le - no drift; leg holds 30 degree position for full 5 seconds   6b  Motor right le - no drift; leg holds 30 degree position for full 5 seconds   7. Limb Ataxia: 0 - absent   8. Sensory: 0 - normal; no sensory loss   9. Best Language:  0 - no aphasia, normal   10. Dysarthria: 0 - normal   11. Extinction and Inattention: 0 - no abnormality         Total:   0     MRS: 0      LABS:   Reviewed. RADIOLOGY:   Images were personally reviewed including:  CTA head 12/15/2020  Left internal carotid artery supraclinoid tortuosity, which simulated   appearance of aneurysm on noncontrast evaluation. CT head 12/15/2020  1. Abnormally increased attenuation within the prepontine cistern, suspicious   for hemorrhage. 2. Partially calcified 9.3 cm aneurysm involving the left carotid terminus. ASSESSMENT:   16yo female with pmh of obesity, depression, asthma. Pt presents with 1x week of new headaches, imaging shows possible prepontine sah and L intracranial calcified ICA aneurysm vs. tortuous ICA. Dx angio indicated to further evaluate, with possible embolization of aneurysm if present. Risk and benefit discussed, pt consented to the procedure.     PLAN:

## 2020-12-16 NOTE — H&P
Neuro ICU History & Physical    Patient Name: Juan Bui  Patient : 2000  Room/Bed: 4375/5714-50  Code Status: Full Code  Allergies: Allergies   Allergen Reactions    Latex        CHIEF COMPLAINT     Headache    HPI    History Obtained From: Patient    The patient is a 23 y.o. female with history of major depressive disorder, asthma, hyperglycemia who presented to the neuro ICU as a transfer from [facility] emergency department due to concern for aneurysm. Patient states that she has had an ongoing headache over the past week. Gradual onset. Constant pain localized to right frontal region. Associated photophobia. No history of migraines in the past.  Mild dizziness/lightheadedness when standing. No neck pain, numbness, tingling, weakness, blurred vision, double vision. Patient went to [facility] emergency department because she cannot sleep after 3 hours of laying in bed and unrelenting headache. CTH at outlying facility read as partially calcified 9.3 cm aneurysm involving the left carotid terminus. Abnormally increased attenuation within the prepontine cistern suspicious for hemorrhage. Case was discussed with Dr. Kira Forrester and decision made to transfer for angiography this morning at 8 AM.  Patient was given Nimotop 60 mg and loaded with Keppra prior to transport. Additionally started on cardene infusion with goal SBP . CTA was performed just prior to transport was read as L internal caroitid artery supraclinoid tortuosity which simulated appearance of aneurysm on noncon CTH.      Admitted to ICU From: [facility] [address] ED  Reason for ICU Admission: HA, concern for aneurysm       PATIENT HISTORY   Past Medical History:        Diagnosis Date    Asthma     Moderate episode of recurrent major depressive disorder ([facility] [address] 2019       Past Surgical History:        Procedure Laterality Date    EYE SURGERY      lazy eye       Social History:   Social History Socioeconomic History    Marital status: Single     Spouse name: Not on file    Number of children: Not on file    Years of education: Not on file    Highest education level: Not on file   Occupational History    Not on file   Social Needs    Financial resource strain: Not on file    Food insecurity     Worry: Not on file     Inability: Not on file    Transportation needs     Medical: Not on file     Non-medical: Not on file   Tobacco Use    Smoking status: Current Some Day Smoker     Types: Cigarettes    Smokeless tobacco: Never Used   Substance and Sexual Activity    Alcohol use: Not Currently     Comment: occaisonally    Drug use: No    Sexual activity: Not on file   Lifestyle    Physical activity     Days per week: Not on file     Minutes per session: Not on file    Stress: Not on file   Relationships    Social connections     Talks on phone: Not on file     Gets together: Not on file     Attends Worship service: Not on file     Active member of club or organization: Not on file     Attends meetings of clubs or organizations: Not on file     Relationship status: Not on file    Intimate partner violence     Fear of current or ex partner: Not on file     Emotionally abused: Not on file     Physically abused: Not on file     Forced sexual activity: Not on file   Other Topics Concern    Not on file   Social History Narrative    Not on file       Family History:       Problem Relation Age of Onset    Diabetes Maternal Cousin     No Known Problems Mother     Anemia Father     Breast Cancer Paternal Grandmother     Lung Cancer Paternal Grandfather        Allergies:    Latex    Medications Prior to Admission:    Medications Prior to Admission: metFORMIN (GLUCOPHAGE-XR) 500 MG extended release tablet, Take 500 mg by mouth daily (with breakfast)  montelukast (SINGULAIR) 10 MG tablet, Take 10 mg by mouth nightly  sertraline (ZOLOFT) 25 MG tablet, Take 25 mg by mouth nightly -CTA: Left internal carotid artery supraclinoid tortuosity, no obvious aneurysm  - AEDs Keppra 500 BID  - Nimotop 60mg q4h  - Goal SBP    -Cardene infusion as needed  - neuroendovascular team following  - angiography today at 8am  - Neuro checks per protocol    CARDIOVASCULAR:  - Goal SBP   - Continue telemetry    PULMONARY:  - Hx of asthma  - albuterol PRN  - spO2 > 92    RENAL/FLUID/ELECTROLYTE:  - BUN 9/ Creatinine 0.70  - IVF: 75cc/h while NPO  - Replace electrolytes PRN  - Daily BMP    GI/NUTRITION:  NUTRITION:  Diet NPO Effective Now  - Bowel regimen: Senokot PRN  - GI prophylaxis: NA    ID:  - Afebrile  - WBC 8.5  - Continue to monitor for fevers  - Daily CBC    HEME:   - H&H  13/41.4  - Platelets 483  - Daily CBC    ENDOCRINE:  - Continue to monitor blood glucose, goal <180  - Hx of hyperglycemia; DM?  - ISS   - A1C pending    OTHER:  - hx of depression  - continue home meds: wellbutrin and zoloft    PROPHYLAXIS:  Stress ulcer: NA    DVT PROPHYLAXIS:  - SCD sleeves - Thigh High   - No chemoprophylaxis anticoagulation at this time.       DISPOSITION: Admit to Neuro ICU      [facility], DO  Neuro Critical Care Service   Pager [phone]  12/16/2020     4:55 AM

## 2020-12-16 NOTE — CARE COORDINATION
Case Management Initial Discharge Plan  [place],             Met with:patient to discuss discharge plans. Information verified: address, contacts, phone number, , insurance Yes    Emergency Contact/Next of Kin name & number: maddie Bush [phone]    PCP: Judy Wisdom MD  Date of last visit: 2 months ago    Insurance Provider: [employer]    Discharge Planning    Living Arrangements:    lives with parents  Support Systems:       Home has 2 stories  4 stairs to climb to get into front door, flight of stairs to climb to reach second floor  Location of bedroom/bathroom in home upper    Patient able to perform ADL's:Independent    Current Services (outpatient & in home) none  DME equipment: none  DME provider: none    Receiving oral anticoagulation therapy? No    If indicated:   Physician managing anticoagulation treatment: none  Where does patient obtain lab work for ATC treatment? none      Potential Assistance Needed:       Patient agreeable to home care: No  [place] of choice provided:  no    Prior SNF/Rehab Placement and Facility: none  Agreeable to SNF/Rehab: No  [place] of choice provided: n/a     Evaluation: no    Expected Discharge date:       Patient expects to be discharged to: Follow Up Appointment: Best Day/ Time:      Transportation provider: parents, boyfriend  Transportation arrangements needed for discharge: No    Readmission Risk              Risk of Unplanned Readmission:        9             Does patient have a readmission risk score greater than 14?: No  If yes, follow-up appointment must be made within 7 days of discharge.      Goals of Care: pain control      Discharge Plan: home independent, monitor for needs          Electronically signed by Glenna Hickman RN on 20 at 6:09 PM EST

## 2020-12-16 NOTE — BRIEF OP NOTE
[facility]    NEUROENDOVASCULAR SERVICE: POST-OP NOTE: 12/16/2020    Pt Name: Carolyn Schwartz  MRN: [id number]  YOB: 2000  Date of Procedure: 12/16/2020  Primary Care Physician: Margareth Cortez MD      Pre-Procedural Diagnosis: left communicating ICA aneurysm  Post-Procedural Diagnosis: as above      Procedure Performed: cerebral angiogram, 3d reconstruction    Surgeon:   Prabha Salgado MD    Fellow:  Garett Earl MD, PhD     1st Assistant:  Alexandru Ascencio    PRE-PROCEDURAL EXAM:  Prestroke baseline mRS MODIFIED JEFF SCORE: 0 - No symptoms at all. Neurological exam performed and unchanged from initial H&P or consult    Anesthesia: MAC anesthesia  Complications: none    Intra-Operative EXAM:  Patient sedated with unchanged limited neurological exam    EBL: < 400      Cc            Specimens: Were not Obtained  Contrast:     Visipaque 270 low osmolar 38 Cc             Fluoro: 10 min    Findings:  Please see dictated Radiology note for further details  --tortuous and dysplastic left communicating ICA, blister aneurysm present dimensions H 0.95mm W 2.7mm L 3.42mm Neck 3.42mm. POST-PROCEDURAL EXAM :   Stable neurological Exam  Neurological exam performed and unchanged from initial H&P or consult    Closure:  right groin 6F sheath remaining in place. POST-PROCEDURAL MONITORING : see orders  Disposition: Neuro ICU      Recommendations:  --Back to NSICU  --Do not bend right leg for 3 hours. --Groin checks per protocol. --Peripheral pulse checks per protocol. --SBP goal   --load asa 325 and Plavix 600 once  --plan for surpass evolve FD placement later today ~4-5pm 12/16/2020  --Upon discharge follow up with Dr. Ina Queen in 2 weeks after discharge and Dr. Jean Oswald in 3 months after discharge.     Garett Earl MD PhD fellow    Prabha Salgado MD   Pager: [phone]  Stroke, [facility] [address]  Electronically signed 12/16/2020 at 10:01 AM

## 2020-12-16 NOTE — ANESTHESIA PRE PROCEDURE
Department of Anesthesiology  Preprocedure Note       Name:  Bita Ortiz   Age:  23 y.o.  :  2000                                          MRN:  [id number]         Date:  2020      Surgeon: * No surgeons listed *    Procedure: * No procedures listed *    Medications prior to admission:   Prior to Admission medications    Medication Sig Start Date End Date Taking?  Authorizing Provider   metFORMIN (GLUCOPHAGE-XR) 500 MG extended release tablet Take 500 mg by mouth daily (with breakfast)   Yes Historical Provider, MD   montelukast (SINGULAIR) 10 MG tablet Take 10 mg by mouth nightly   Yes Historical Provider, MD   sertraline (ZOLOFT) 25 MG tablet Take 25 mg by mouth nightly   Yes Historical Provider, MD   buPROPion (WELLBUTRIN XL) 150 MG extended release tablet Take 75 mg by mouth nightly   Yes Historical Provider, MD   loratadine (CLARITIN) 10 MG tablet Take 10 mg by mouth nightly   Yes Historical Provider, MD   levonorgestrel-ethinyl estradiol (SEASONALE) 0.15-0.03 MG per tablet Take 1 tablet by mouth daily 20   Dennice Lanes, APRN - CNP   fluticasone (FLOVENT HFA) 110 MCG/ACT inhaler Inhale 2 puffs into the lungs 2 times daily 1/3/19 1/3/20  Francisco Cummings MD       Current medications:    Current Facility-Administered Medications   Medication Dose Route Frequency Provider Last Rate Last Admin    sodium chloride flush 0.9 % injection 10 mL  10 mL Intravenous 2 times per day Bozrah Berenice, DO        sodium chloride flush 0.9 % injection 10 mL  10 mL Intravenous PRN Yissel Berenice, DO        promethazine (PHENERGAN) tablet 12.5 mg  12.5 mg Oral Q6H PRN Yissel Berenice, DO        Or    ondansetron [facility]) injection 4 mg  4 mg Intravenous Q6H PRN Bozrah Berenice, DO        acetaminophen (TYLENOL) tablet 650 mg  650 mg Oral Q6H PRN Yissel Berenice, DO        Or    acetaminophen (TYLENOL) suppository 650 mg  650 mg Rectal Q6H PRN Yissel Berenice, DO  0.9 % sodium chloride infusion   Intravenous Continuous Junious Sleek, DO 75 mL/hr at 12/16/20 0453 New Bag at 12/16/20 0453    senna (SENOKOT) tablet 8.6 mg  1 tablet Oral Daily PRN Junious Sleek, DO        niMODipine (NIMOTOP) capsule 60 mg  60 mg Oral 6 times per day Junious Sleek, DO   60 mg at 12/16/20 0453    niCARdipine (CARDENE) 25 mg in dextrose 5 % 250 mL infusion  5 mg/hr Intravenous Continuous Junious Sleek, DO        buPROPion [facility]) tablet 75 mg  75 mg Oral Nightly Junious Sleek, DO        fluticasone (FLOVENT HFA) 110 MCG/ACT inhaler 2 puff  2 puff Inhalation BID Junious Sleek, DO        levonorgestrel-ethinyl estradiol (SEASONALE) 0.15-0.03 MG per tablet 1 tablet  1 tablet Oral Daily Junious Sleek, DO        montelukast (SINGULAIR) tablet 10 mg  10 mg Oral Nightly Junious Sleek, DO        sertraline (ZOLOFT) tablet 25 mg  25 mg Oral Nightly Junious Sleek, DO        insulin lispro (HUMALOG) injection vial 0-6 Units  0-6 Units Subcutaneous TID WC Junious Sleek, DO        insulin lispro (HUMALOG) injection vial 0-3 Units  0-3 Units Subcutaneous Nightly Junious Sleek, DO        glucose (GLUTOSE) 40 % oral gel 15 g  15 g Oral PRN Junious Sleek, DO        dextrose 50 % IV solution  12.5 g Intravenous PRN Junious Sleek, DO        glucagon (rDNA) injection 1 mg  1 mg Intramuscular PRN Junious Sleek, DO        dextrose 5 % solution  100 mL/hr Intravenous PRN Junious Sleek, DO        fentaNYL (SUBLIMAZE) injection 50 mcg  50 mcg Intravenous Q5 Min PRN Britt Coffman MD        ondansetron [facility]) injection 4 mg  4 mg Intravenous Once PRN Britt Coffman MD           Allergies: Allergies   Allergen Reactions    Latex        Problem List:    Patient Active Problem List   Diagnosis Code    Moderate episode of recurrent major depressive disorder ([facility] [address] F33.1    Unintended weight gain R63.5    Obesity (BMI 30.0-34. 9) E66.9  Chronic fatigue R53.82    Moderate persistent asthma without complication M06.98    Hyperglycemia R73.9    LGSIL on Pap smear of cervix R87.612    Subarachnoid hemorrhage (HCC) I60.9       Past Medical History:        Diagnosis Date    Asthma     Moderate episode of recurrent major depressive disorder ([facility] [address] 1/7/2019       Past Surgical History:        Procedure Laterality Date    EYE SURGERY      lazy eye       Social History:    Social History     Tobacco Use    Smoking status: Current Some Day Smoker     Types: Cigarettes    Smokeless tobacco: Never Used   Substance Use Topics    Alcohol use: Not Currently     Comment: occaisonally                                Ready to quit: Not Answered  Counseling given: Not Answered      Vital Signs (Current):   Vitals:    12/16/20 0500 12/16/20 0530 12/16/20 0600 12/16/20 0630   BP: (!) 93/51 (!) 109/49 (!) 92/41 (!) 97/57   Pulse: 92 103 96 93   Resp: 21 22 18 17   Temp:       TempSrc:       SpO2: 98% 96% 95% 95%   Weight:       Height:                                                  BP Readings from Last 3 Encounters:   12/16/20 (!) 97/57   12/16/20 117/74   12/08/20 120/68       NPO Status:                                                                                 BMI:   Wt Readings from Last 3 Encounters:   12/16/20 197 lb 15.6 oz (89.8 kg)   12/15/20 190 lb (86.2 kg)   12/08/20 192 lb (87.1 kg)     Body mass index is 36.21 kg/m².     CBC:   Lab Results   Component Value Date    WBC 8.5 12/15/2020    RBC 4.71 12/15/2020    HGB 13.0 12/15/2020    HCT 41.4 12/15/2020    MCV 87.9 12/15/2020    RDW 12.8 12/15/2020     12/15/2020       CMP:   Lab Results   Component Value Date     12/15/2020    K 3.9 12/15/2020     12/15/2020    CO2 22 12/15/2020    BUN 9 12/15/2020    CREATININE 0.70 12/15/2020    GFRAA >60 12/15/2020    LABGLOM >60 12/15/2020    GLUCOSE 98 12/15/2020    PROT 7.9 12/20/2015    CALCIUM 9.3 12/15/2020 BILITOT 0.35 12/20/2015    ALKPHOS 79 12/20/2015    AST 17 12/20/2015    ALT 11 12/20/2015       POC Tests:   Recent Labs     12/16/20  0740   POCGLU 86       Coags:   Lab Results   Component Value Date    PROTIME 13.8 12/15/2020    INR 1.1 12/15/2020    APTT 29.9 12/15/2020       HCG (If Applicable):   Lab Results   Component Value Date    PREGTESTUR neg 12/15/2020    HCG NEGATIVE 02/10/2020        ABGs: No results found for: PHART, PO2ART, XHW4SFC, WIN5ZGU, BEART, Q1GHQVRV     Type & Screen (If Applicable):  No results found for: LABABO, LABRH    Drug/Infectious Status (If Applicable):  No results found for: HIV, HEPCAB    COVID-19 Screening (If Applicable):   Lab Results   Component Value Date    COVID19 Not Detected 12/16/2020         Anesthesia Evaluation  Patient summary reviewed  Airway: Mallampati: II  TM distance: >3 FB   Neck ROM: full  Mouth opening: > = 3 FB Dental: normal exam         Pulmonary: breath sounds clear to auscultation  (+) asthma:                            Cardiovascular:  Exercise tolerance: good (>4 METS),           Rhythm: regular  Rate: normal           Beta Blocker:  Not on Beta Blocker         Neuro/Psych:   (+) psychiatric history: stable without treatment            GI/Hepatic/Renal:             Endo/Other:    (+) Diabetes, . Abdominal:       Abdomen: soft. Vascular:                                        Anesthesia Plan      general     ASA 4 - emergent       Induction: intravenous. arterial line  MIPS: Postoperative opioids intended and Prophylactic antiemetics administered. Anesthetic plan and risks discussed with patient. Plan discussed with CRNA.     Attending anesthesiologist reviewed and agrees with [address] Eugenie Bobby MD   12/16/2020

## 2020-12-17 ENCOUNTER — ANESTHESIA (OUTPATIENT)
Dept: INTERVENTIONAL RADIOLOGY/VASCULAR | Age: 20
DRG: 030 | End: 2020-12-17
Payer: MEDICARE

## 2020-12-17 ENCOUNTER — ANESTHESIA EVENT (OUTPATIENT)
Dept: INTERVENTIONAL RADIOLOGY/VASCULAR | Age: 20
DRG: 030 | End: 2020-12-17
Payer: MEDICARE

## 2020-12-17 ENCOUNTER — APPOINTMENT (OUTPATIENT)
Dept: INTERVENTIONAL RADIOLOGY/VASCULAR | Age: 20
DRG: 030 | End: 2020-12-17
Attending: PSYCHIATRY & NEUROLOGY
Payer: MEDICARE

## 2020-12-17 VITALS — SYSTOLIC BLOOD PRESSURE: 93 MMHG | DIASTOLIC BLOOD PRESSURE: 45 MMHG | TEMPERATURE: 98.2 F | OXYGEN SATURATION: 95 %

## 2020-12-17 LAB
ABO/RH: NORMAL
ABO/RH: NORMAL
ACTIVATED CLOTTING TIME: 111 SEC (ref 79–149)
ACTIVATED CLOTTING TIME: 205 SEC (ref 79–149)
ACTIVATED CLOTTING TIME: 257 SEC (ref 79–149)
ANION GAP SERPL CALCULATED.3IONS-SCNC: 8 MMOL/L (ref 9–17)
ANTIBODY SCREEN: NEGATIVE
ANTIBODY SCREEN: NEGATIVE
ARM BAND NUMBER: NORMAL
ARM BAND NUMBER: NORMAL
BUN BLDV-MCNC: 7 MG/DL (ref 6–20)
BUN/CREAT BLD: ABNORMAL (ref 9–20)
CALCIUM SERPL-MCNC: 8 MG/DL (ref 8.6–10.4)
CHLORIDE BLD-SCNC: 109 MMOL/L (ref 98–107)
CO2: 19 MMOL/L (ref 20–31)
CREAT SERPL-MCNC: 0.5 MG/DL (ref 0.5–0.9)
CULTURE: NO GROWTH
CYTOLOGY REPORT: NORMAL
ESTIMATED AVERAGE GLUCOSE: 97 MG/DL
EXPIRATION DATE: NORMAL
EXPIRATION DATE: NORMAL
GFR AFRICAN AMERICAN: >60 ML/MIN
GFR NON-AFRICAN AMERICAN: >60 ML/MIN
GFR SERPL CREATININE-BSD FRML MDRD: ABNORMAL ML/MIN/{1.73_M2}
GFR SERPL CREATININE-BSD FRML MDRD: ABNORMAL ML/MIN/{1.73_M2}
GLUCOSE BLD-MCNC: 138 MG/DL (ref 70–99)
GLUCOSE BLD-MCNC: 157 MG/DL (ref 65–105)
GLUCOSE BLD-MCNC: 76 MG/DL (ref 65–105)
GLUCOSE BLD-MCNC: 81 MG/DL (ref 65–105)
GLUCOSE BLD-MCNC: 94 MG/DL (ref 65–105)
HBA1C MFR BLD: 5 % (ref 4–6)
HCT VFR BLD CALC: 34.8 % (ref 36.3–47.1)
HEMOGLOBIN: 11.1 G/DL (ref 11.9–15.1)
LV EF: 61 %
LVEF MODALITY: NORMAL
Lab: NORMAL
MCH RBC QN AUTO: 27.3 PG (ref 25.2–33.5)
MCHC RBC AUTO-ENTMCNC: 31.9 G/DL (ref 28.4–34.8)
MCV RBC AUTO: 85.7 FL (ref 82.6–102.9)
NRBC AUTOMATED: 0 PER 100 WBC
PDW BLD-RTO: 13.1 % (ref 11.8–14.4)
PLATELET # BLD: 246 K/UL (ref 138–453)
PMV BLD AUTO: 12.1 FL (ref 8.1–13.5)
POTASSIUM SERPL-SCNC: 3.4 MMOL/L (ref 3.7–5.3)
RBC # BLD: 4.06 M/UL (ref 3.95–5.11)
SODIUM BLD-SCNC: 136 MMOL/L (ref 135–144)
SPECIMEN DESCRIPTION: NORMAL
WBC # BLD: 8.8 K/UL (ref 4.5–13.5)

## 2020-12-17 PROCEDURE — 2500000003 HC RX 250 WO HCPCS: Performed by: NURSE ANESTHETIST, CERTIFIED REGISTERED

## 2020-12-17 PROCEDURE — 7100000000 HC PACU RECOVERY - FIRST 15 MIN

## 2020-12-17 PROCEDURE — 99232 SBSQ HOSP IP/OBS MODERATE 35: CPT | Performed by: PSYCHIATRY & NEUROLOGY

## 2020-12-17 PROCEDURE — 6370000000 HC RX 637 (ALT 250 FOR IP): Performed by: STUDENT IN AN ORGANIZED HEALTH CARE EDUCATION/TRAINING PROGRAM

## 2020-12-17 PROCEDURE — 2709999900 HC NON-CHARGEABLE SUPPLY

## 2020-12-17 PROCEDURE — 6360000002 HC RX W HCPCS: Performed by: NURSE PRACTITIONER

## 2020-12-17 PROCEDURE — 75894 X-RAYS TRANSCATH THERAPY: CPT | Performed by: PSYCHIATRY & NEUROLOGY

## 2020-12-17 PROCEDURE — 6360000002 HC RX W HCPCS: Performed by: NURSE ANESTHETIST, CERTIFIED REGISTERED

## 2020-12-17 PROCEDURE — 7100000001 HC PACU RECOVERY - ADDTL 15 MIN

## 2020-12-17 PROCEDURE — 6370000000 HC RX 637 (ALT 250 FOR IP): Performed by: PSYCHIATRY & NEUROLOGY

## 2020-12-17 PROCEDURE — 82947 ASSAY GLUCOSE BLOOD QUANT: CPT

## 2020-12-17 PROCEDURE — C1769 GUIDE WIRE: HCPCS

## 2020-12-17 PROCEDURE — 80048 BASIC METABOLIC PNL TOTAL CA: CPT

## 2020-12-17 PROCEDURE — 6360000002 HC RX W HCPCS: Performed by: ANESTHESIOLOGY

## 2020-12-17 PROCEDURE — B31B1ZZ FLUOROSCOPY OF LEFT EXTERNAL CAROTID ARTERY USING LOW OSMOLAR CONTRAST: ICD-10-PCS | Performed by: PSYCHIATRY & NEUROLOGY

## 2020-12-17 PROCEDURE — C1760 CLOSURE DEV, VASC: HCPCS

## 2020-12-17 PROCEDURE — 75898 FOLLOW-UP ANGIOGRAPHY: CPT | Performed by: PSYCHIATRY & NEUROLOGY

## 2020-12-17 PROCEDURE — C1894 INTRO/SHEATH, NON-LASER: HCPCS

## 2020-12-17 PROCEDURE — 6360000002 HC RX W HCPCS

## 2020-12-17 PROCEDURE — 86901 BLOOD TYPING SEROLOGIC RH(D): CPT

## 2020-12-17 PROCEDURE — 2580000003 HC RX 258: Performed by: NURSE ANESTHETIST, CERTIFIED REGISTERED

## 2020-12-17 PROCEDURE — 2580000003 HC RX 258: Performed by: ANESTHESIOLOGY

## 2020-12-17 PROCEDURE — 3700000000 HC ANESTHESIA ATTENDED CARE

## 2020-12-17 PROCEDURE — C1725 CATH, TRANSLUMIN NON-LASER: HCPCS

## 2020-12-17 PROCEDURE — 03VG3HZ RESTRICTION OF INTRACRANIAL ARTERY WITH INTRALUMINAL DEVICE, FLOW DIVERTER, PERCUTANEOUS APPROACH: ICD-10-PCS | Performed by: PSYCHIATRY & NEUROLOGY

## 2020-12-17 PROCEDURE — 6360000002 HC RX W HCPCS: Performed by: STUDENT IN AN ORGANIZED HEALTH CARE EDUCATION/TRAINING PROGRAM

## 2020-12-17 PROCEDURE — 85730 THROMBOPLASTIN TIME PARTIAL: CPT

## 2020-12-17 PROCEDURE — C1887 CATHETER, GUIDING: HCPCS

## 2020-12-17 PROCEDURE — 61624 TCAT PERM OCCLS/EMBOLJ CNS: CPT | Performed by: PSYCHIATRY & NEUROLOGY

## 2020-12-17 PROCEDURE — 6360000004 HC RX CONTRAST MEDICATION: Performed by: PSYCHIATRY & NEUROLOGY

## 2020-12-17 PROCEDURE — 86900 BLOOD TYPING SEROLOGIC ABO: CPT

## 2020-12-17 PROCEDURE — 99233 SBSQ HOSP IP/OBS HIGH 50: CPT | Performed by: PSYCHIATRY & NEUROLOGY

## 2020-12-17 PROCEDURE — B3171ZZ FLUOROSCOPY OF LEFT INTERNAL CAROTID ARTERY USING LOW OSMOLAR CONTRAST: ICD-10-PCS | Performed by: PSYCHIATRY & NEUROLOGY

## 2020-12-17 PROCEDURE — 61635 INTRACRAN ANGIOPLSTY W/STENT: CPT | Performed by: PSYCHIATRY & NEUROLOGY

## 2020-12-17 PROCEDURE — 86850 RBC ANTIBODY SCREEN: CPT

## 2020-12-17 PROCEDURE — 2000000003 HC NEURO ICU R&B

## 2020-12-17 PROCEDURE — 94640 AIRWAY INHALATION TREATMENT: CPT

## 2020-12-17 PROCEDURE — 3700000001 HC ADD 15 MINUTES (ANESTHESIA)

## 2020-12-17 PROCEDURE — 93306 TTE W/DOPPLER COMPLETE: CPT

## 2020-12-17 PROCEDURE — 36217 PLACE CATHETER IN ARTERY: CPT | Performed by: PSYCHIATRY & NEUROLOGY

## 2020-12-17 PROCEDURE — B3141ZZ FLUOROSCOPY OF LEFT COMMON CAROTID ARTERY USING LOW OSMOLAR CONTRAST: ICD-10-PCS | Performed by: PSYCHIATRY & NEUROLOGY

## 2020-12-17 PROCEDURE — 85027 COMPLETE CBC AUTOMATED: CPT

## 2020-12-17 PROCEDURE — 85347 COAGULATION TIME ACTIVATED: CPT

## 2020-12-17 PROCEDURE — B41F1ZZ FLUOROSCOPY OF RIGHT LOWER EXTREMITY ARTERIES USING LOW OSMOLAR CONTRAST: ICD-10-PCS | Performed by: PSYCHIATRY & NEUROLOGY

## 2020-12-17 RX ORDER — HEPARIN SODIUM 1000 [USP'U]/ML
INJECTION, SOLUTION INTRAVENOUS; SUBCUTANEOUS PRN
Status: DISCONTINUED | OUTPATIENT
Start: 2020-12-17 | End: 2020-12-17 | Stop reason: SDUPTHER

## 2020-12-17 RX ORDER — ASPIRIN 300 MG/1
600 SUPPOSITORY RECTAL ONCE
Status: COMPLETED | OUTPATIENT
Start: 2020-12-17 | End: 2020-12-17

## 2020-12-17 RX ORDER — SODIUM CHLORIDE, SODIUM LACTATE, POTASSIUM CHLORIDE, CALCIUM CHLORIDE 600; 310; 30; 20 MG/100ML; MG/100ML; MG/100ML; MG/100ML
INJECTION, SOLUTION INTRAVENOUS CONTINUOUS
Status: DISCONTINUED | OUTPATIENT
Start: 2020-12-17 | End: 2020-12-18

## 2020-12-17 RX ORDER — GLYCOPYRROLATE 1 MG/5 ML
SYRINGE (ML) INTRAVENOUS PRN
Status: DISCONTINUED | OUTPATIENT
Start: 2020-12-17 | End: 2020-12-17 | Stop reason: SDUPTHER

## 2020-12-17 RX ORDER — MORPHINE SULFATE 2 MG/ML
INJECTION, SOLUTION INTRAMUSCULAR; INTRAVENOUS
Status: COMPLETED
Start: 2020-12-17 | End: 2020-12-17

## 2020-12-17 RX ORDER — DIPHENHYDRAMINE HYDROCHLORIDE 50 MG/ML
INJECTION INTRAMUSCULAR; INTRAVENOUS PRN
Status: DISCONTINUED | OUTPATIENT
Start: 2020-12-17 | End: 2020-12-17 | Stop reason: SDUPTHER

## 2020-12-17 RX ORDER — HEPARIN SODIUM 10000 [USP'U]/100ML
11.1 INJECTION, SOLUTION INTRAVENOUS CONTINUOUS
Status: DISCONTINUED | OUTPATIENT
Start: 2020-12-18 | End: 2020-12-18

## 2020-12-17 RX ORDER — DEXAMETHASONE SODIUM PHOSPHATE 10 MG/ML
INJECTION INTRAMUSCULAR; INTRAVENOUS PRN
Status: DISCONTINUED | OUTPATIENT
Start: 2020-12-17 | End: 2020-12-17 | Stop reason: SDUPTHER

## 2020-12-17 RX ORDER — CLOPIDOGREL BISULFATE 75 MG/1
150 TABLET ORAL ONCE
Status: COMPLETED | OUTPATIENT
Start: 2020-12-17 | End: 2020-12-17

## 2020-12-17 RX ORDER — FENTANYL CITRATE 50 UG/ML
INJECTION, SOLUTION INTRAMUSCULAR; INTRAVENOUS PRN
Status: DISCONTINUED | OUTPATIENT
Start: 2020-12-17 | End: 2020-12-17 | Stop reason: SDUPTHER

## 2020-12-17 RX ORDER — MORPHINE SULFATE 2 MG/ML
2 INJECTION, SOLUTION INTRAMUSCULAR; INTRAVENOUS ONCE
Status: COMPLETED | OUTPATIENT
Start: 2020-12-17 | End: 2020-12-17

## 2020-12-17 RX ORDER — MIDAZOLAM HYDROCHLORIDE 1 MG/ML
INJECTION INTRAMUSCULAR; INTRAVENOUS PRN
Status: DISCONTINUED | OUTPATIENT
Start: 2020-12-17 | End: 2020-12-17 | Stop reason: SDUPTHER

## 2020-12-17 RX ORDER — GABAPENTIN 100 MG/1
200 CAPSULE ORAL ONCE
Status: COMPLETED | OUTPATIENT
Start: 2020-12-17 | End: 2020-12-17

## 2020-12-17 RX ORDER — SODIUM CHLORIDE 0.9 % (FLUSH) 0.9 %
10 SYRINGE (ML) INJECTION PRN
Status: DISCONTINUED | OUTPATIENT
Start: 2020-12-17 | End: 2020-12-19 | Stop reason: HOSPADM

## 2020-12-17 RX ORDER — NEOSTIGMINE METHYLSULFATE 5 MG/5 ML
SYRINGE (ML) INTRAVENOUS PRN
Status: DISCONTINUED | OUTPATIENT
Start: 2020-12-17 | End: 2020-12-17 | Stop reason: SDUPTHER

## 2020-12-17 RX ORDER — MIDAZOLAM HYDROCHLORIDE 2 MG/2ML
2 INJECTION, SOLUTION INTRAMUSCULAR; INTRAVENOUS
Status: DISCONTINUED | OUTPATIENT
Start: 2020-12-17 | End: 2020-12-17

## 2020-12-17 RX ORDER — SODIUM CHLORIDE 0.9 % (FLUSH) 0.9 %
10 SYRINGE (ML) INJECTION EVERY 12 HOURS SCHEDULED
Status: DISCONTINUED | OUTPATIENT
Start: 2020-12-17 | End: 2020-12-19 | Stop reason: HOSPADM

## 2020-12-17 RX ORDER — LIDOCAINE 4 G/G
1 PATCH TOPICAL DAILY
Status: DISCONTINUED | OUTPATIENT
Start: 2020-12-17 | End: 2020-12-19 | Stop reason: HOSPADM

## 2020-12-17 RX ORDER — PROPOFOL 10 MG/ML
INJECTION, EMULSION INTRAVENOUS PRN
Status: DISCONTINUED | OUTPATIENT
Start: 2020-12-17 | End: 2020-12-17 | Stop reason: SDUPTHER

## 2020-12-17 RX ORDER — ACETAMINOPHEN 500 MG
1000 TABLET ORAL ONCE
Status: COMPLETED | OUTPATIENT
Start: 2020-12-17 | End: 2020-12-17

## 2020-12-17 RX ORDER — ACETAMINOPHEN 325 MG/1
650 TABLET ORAL EVERY 4 HOURS PRN
Status: DISCONTINUED | OUTPATIENT
Start: 2020-12-17 | End: 2020-12-17

## 2020-12-17 RX ORDER — PROMETHAZINE HYDROCHLORIDE 12.5 MG/1
12.5 TABLET ORAL EVERY 6 HOURS PRN
Status: DISCONTINUED | OUTPATIENT
Start: 2020-12-17 | End: 2020-12-19 | Stop reason: HOSPADM

## 2020-12-17 RX ORDER — PHENYLEPHRINE HCL IN 0.9% NACL 1 MG/10 ML
SYRINGE (ML) INTRAVENOUS PRN
Status: DISCONTINUED | OUTPATIENT
Start: 2020-12-17 | End: 2020-12-17 | Stop reason: SDUPTHER

## 2020-12-17 RX ORDER — MIDAZOLAM HYDROCHLORIDE 2 MG/2ML
2 INJECTION, SOLUTION INTRAMUSCULAR; INTRAVENOUS ONCE
Status: COMPLETED | OUTPATIENT
Start: 2020-12-17 | End: 2020-12-17

## 2020-12-17 RX ORDER — LIDOCAINE HYDROCHLORIDE 10 MG/ML
INJECTION, SOLUTION EPIDURAL; INFILTRATION; INTRACAUDAL; PERINEURAL PRN
Status: DISCONTINUED | OUTPATIENT
Start: 2020-12-17 | End: 2020-12-17 | Stop reason: SDUPTHER

## 2020-12-17 RX ORDER — SODIUM CHLORIDE 9 MG/ML
INJECTION, SOLUTION INTRAVENOUS CONTINUOUS PRN
Status: DISCONTINUED | OUTPATIENT
Start: 2020-12-17 | End: 2020-12-17 | Stop reason: SDUPTHER

## 2020-12-17 RX ORDER — ONDANSETRON 2 MG/ML
INJECTION INTRAMUSCULAR; INTRAVENOUS PRN
Status: DISCONTINUED | OUTPATIENT
Start: 2020-12-17 | End: 2020-12-17 | Stop reason: SDUPTHER

## 2020-12-17 RX ORDER — ROCURONIUM BROMIDE 10 MG/ML
INJECTION, SOLUTION INTRAVENOUS PRN
Status: DISCONTINUED | OUTPATIENT
Start: 2020-12-17 | End: 2020-12-17 | Stop reason: SDUPTHER

## 2020-12-17 RX ORDER — FENTANYL CITRATE 50 UG/ML
25 INJECTION, SOLUTION INTRAMUSCULAR; INTRAVENOUS ONCE
Status: COMPLETED | OUTPATIENT
Start: 2020-12-17 | End: 2020-12-17

## 2020-12-17 RX ORDER — ONDANSETRON 2 MG/ML
4 INJECTION INTRAMUSCULAR; INTRAVENOUS EVERY 6 HOURS PRN
Status: DISCONTINUED | OUTPATIENT
Start: 2020-12-17 | End: 2020-12-19 | Stop reason: HOSPADM

## 2020-12-17 RX ORDER — ONDANSETRON 2 MG/ML
4 INJECTION INTRAMUSCULAR; INTRAVENOUS ONCE
Status: DISCONTINUED | OUTPATIENT
Start: 2020-12-17 | End: 2020-12-19 | Stop reason: HOSPADM

## 2020-12-17 RX ORDER — IODIXANOL 270 MG/ML
150 INJECTION, SOLUTION INTRAVASCULAR
Status: COMPLETED | OUTPATIENT
Start: 2020-12-17 | End: 2020-12-17

## 2020-12-17 RX ADMIN — ROCURONIUM BROMIDE 50 MG: 10 INJECTION INTRAVENOUS at 15:51

## 2020-12-17 RX ADMIN — HYDROMORPHONE HYDROCHLORIDE 0.5 MG: 1 INJECTION, SOLUTION INTRAMUSCULAR; INTRAVENOUS; SUBCUTANEOUS at 19:38

## 2020-12-17 RX ADMIN — Medication 3 MG: at 17:51

## 2020-12-17 RX ADMIN — LEVETIRACETAM 500 MG: 5 INJECTION INTRAVENOUS at 08:34

## 2020-12-17 RX ADMIN — FENTANYL CITRATE 100 MCG: 50 INJECTION, SOLUTION INTRAMUSCULAR; INTRAVENOUS at 15:51

## 2020-12-17 RX ADMIN — SERTRALINE 25 MG: 25 TABLET, FILM COATED ORAL at 21:00

## 2020-12-17 RX ADMIN — MORPHINE SULFATE 2 MG: 2 INJECTION, SOLUTION INTRAMUSCULAR; INTRAVENOUS at 11:06

## 2020-12-17 RX ADMIN — FENTANYL CITRATE 50 MCG: 50 INJECTION, SOLUTION INTRAMUSCULAR; INTRAVENOUS at 09:00

## 2020-12-17 RX ADMIN — ONDANSETRON 4 MG: 2 INJECTION INTRAMUSCULAR; INTRAVENOUS at 17:30

## 2020-12-17 RX ADMIN — DEXAMETHASONE SODIUM PHOSPHATE 10 MG: 10 INJECTION INTRAMUSCULAR; INTRAVENOUS at 16:05

## 2020-12-17 RX ADMIN — FLUTICASONE PROPIONATE 2 PUFF: 110 AEROSOL, METERED RESPIRATORY (INHALATION) at 20:27

## 2020-12-17 RX ADMIN — MIDAZOLAM HYDROCHLORIDE 2 MG: 1 INJECTION, SOLUTION INTRAMUSCULAR; INTRAVENOUS at 09:25

## 2020-12-17 RX ADMIN — FENTANYL CITRATE 25 MCG: 50 INJECTION, SOLUTION INTRAMUSCULAR; INTRAVENOUS at 15:26

## 2020-12-17 RX ADMIN — PROPOFOL 170 MG: 10 INJECTION, EMULSION INTRAVENOUS at 15:51

## 2020-12-17 RX ADMIN — CLOPIDOGREL 150 MG: 75 TABLET, FILM COATED ORAL at 21:00

## 2020-12-17 RX ADMIN — Medication 15 MG: at 15:46

## 2020-12-17 RX ADMIN — HEPARIN SODIUM 3000 UNITS: 1000 INJECTION INTRAVENOUS; SUBCUTANEOUS at 17:05

## 2020-12-17 RX ADMIN — IODIXANOL 72 ML: 270 INJECTION, SOLUTION INTRAVASCULAR at 17:37

## 2020-12-17 RX ADMIN — SODIUM CHLORIDE: 9 INJECTION, SOLUTION INTRAVENOUS at 15:44

## 2020-12-17 RX ADMIN — MORPHINE SULFATE 2 MG: 2 INJECTION, SOLUTION INTRAMUSCULAR; INTRAVENOUS at 09:22

## 2020-12-17 RX ADMIN — CEFAZOLIN SODIUM 2 G: 10 INJECTION, POWDER, FOR SOLUTION INTRAVENOUS at 16:05

## 2020-12-17 RX ADMIN — Medication 0.6 MG: at 17:51

## 2020-12-17 RX ADMIN — NIMODIPINE 60 MG: 30 CAPSULE ORAL at 19:58

## 2020-12-17 RX ADMIN — FLUTICASONE PROPIONATE 2 PUFF: 110 AEROSOL, METERED RESPIRATORY (INHALATION) at 07:59

## 2020-12-17 RX ADMIN — NIMODIPINE 60 MG: 30 CAPSULE ORAL at 02:39

## 2020-12-17 RX ADMIN — HEPARIN SODIUM 6500 UNITS: 1000 INJECTION INTRAVENOUS; SUBCUTANEOUS at 16:36

## 2020-12-17 RX ADMIN — NIMODIPINE 60 MG: 30 CAPSULE ORAL at 06:48

## 2020-12-17 RX ADMIN — GABAPENTIN 200 MG: 100 CAPSULE ORAL at 22:54

## 2020-12-17 RX ADMIN — LIDOCAINE HYDROCHLORIDE 50 MG: 10 INJECTION, SOLUTION EPIDURAL; INFILTRATION; INTRACAUDAL; PERINEURAL at 15:51

## 2020-12-17 RX ADMIN — SODIUM CHLORIDE, POTASSIUM CHLORIDE, SODIUM LACTATE AND CALCIUM CHLORIDE: 600; 310; 30; 20 INJECTION, SOLUTION INTRAVENOUS at 14:51

## 2020-12-17 RX ADMIN — MONTELUKAST SODIUM 10 MG: 10 TABLET, FILM COATED ORAL at 21:00

## 2020-12-17 RX ADMIN — NIMODIPINE 60 MG: 30 CAPSULE ORAL at 10:57

## 2020-12-17 RX ADMIN — Medication 50 MCG: at 16:59

## 2020-12-17 RX ADMIN — HEPARIN SODIUM 2000 UNITS: 1000 INJECTION INTRAVENOUS; SUBCUTANEOUS at 17:27

## 2020-12-17 RX ADMIN — BUPROPION HYDROCHLORIDE 75 MG: 75 TABLET, FILM COATED ORAL at 21:00

## 2020-12-17 RX ADMIN — LEVETIRACETAM 500 MG: 5 INJECTION INTRAVENOUS at 20:59

## 2020-12-17 RX ADMIN — MIDAZOLAM HYDROCHLORIDE 2 MG: 1 INJECTION, SOLUTION INTRAMUSCULAR; INTRAVENOUS at 15:46

## 2020-12-17 RX ADMIN — ACETAMINOPHEN 1000 MG: 500 TABLET ORAL at 02:38

## 2020-12-17 RX ADMIN — ASPIRIN 600 MG: 300 SUPPOSITORY RECTAL at 17:40

## 2020-12-17 ASSESSMENT — PULMONARY FUNCTION TESTS
PIF_VALUE: 21
PIF_VALUE: 20
PIF_VALUE: 22
PIF_VALUE: 21
PIF_VALUE: 22
PIF_VALUE: 21
PIF_VALUE: 0
PIF_VALUE: 22
PIF_VALUE: 21
PIF_VALUE: 21
PIF_VALUE: 1
PIF_VALUE: 21
PIF_VALUE: 22
PIF_VALUE: 18
PIF_VALUE: 4
PIF_VALUE: 21
PIF_VALUE: 23
PIF_VALUE: 21
PIF_VALUE: 18
PIF_VALUE: 21
PIF_VALUE: 20
PIF_VALUE: 3
PIF_VALUE: 23
PIF_VALUE: 23
PIF_VALUE: 22
PIF_VALUE: 1
PIF_VALUE: 22
PIF_VALUE: 3
PIF_VALUE: 22
PIF_VALUE: 21
PIF_VALUE: 21
PIF_VALUE: 23
PIF_VALUE: 21
PIF_VALUE: 21
PIF_VALUE: 18
PIF_VALUE: 21
PIF_VALUE: 22
PIF_VALUE: 23
PIF_VALUE: 22
PIF_VALUE: 3
PIF_VALUE: 21
PIF_VALUE: 22
PIF_VALUE: 15
PIF_VALUE: 22
PIF_VALUE: 3
PIF_VALUE: 21
PIF_VALUE: 5
PIF_VALUE: 4
PIF_VALUE: 21
PIF_VALUE: 22
PIF_VALUE: 23
PIF_VALUE: 21
PIF_VALUE: 23
PIF_VALUE: 21
PIF_VALUE: 21
PIF_VALUE: 23
PIF_VALUE: 21
PIF_VALUE: 23
PIF_VALUE: 21
PIF_VALUE: 17
PIF_VALUE: 21
PIF_VALUE: 22
PIF_VALUE: 0
PIF_VALUE: 22
PIF_VALUE: 21
PIF_VALUE: 22
PIF_VALUE: 2
PIF_VALUE: 22
PIF_VALUE: 21
PIF_VALUE: 22
PIF_VALUE: 0
PIF_VALUE: 6
PIF_VALUE: 22
PIF_VALUE: 21
PIF_VALUE: 3
PIF_VALUE: 4
PIF_VALUE: 22
PIF_VALUE: 23
PIF_VALUE: 22
PIF_VALUE: 21
PIF_VALUE: 3
PIF_VALUE: 21
PIF_VALUE: 18
PIF_VALUE: 21
PIF_VALUE: 22
PIF_VALUE: 22
PIF_VALUE: 23
PIF_VALUE: 18
PIF_VALUE: 21
PIF_VALUE: 22
PIF_VALUE: 22
PIF_VALUE: 16
PIF_VALUE: 21
PIF_VALUE: 20
PIF_VALUE: 23
PIF_VALUE: 23
PIF_VALUE: 21
PIF_VALUE: 1
PIF_VALUE: 20
PIF_VALUE: 21
PIF_VALUE: 19
PIF_VALUE: 22
PIF_VALUE: 23
PIF_VALUE: 21
PIF_VALUE: 23
PIF_VALUE: 21
PIF_VALUE: 0
PIF_VALUE: 20
PIF_VALUE: 21
PIF_VALUE: 21
PIF_VALUE: 23
PIF_VALUE: 18
PIF_VALUE: 21
PIF_VALUE: 22
PIF_VALUE: 18
PIF_VALUE: 21
PIF_VALUE: 23
PIF_VALUE: 21
PIF_VALUE: 21
PIF_VALUE: 23
PIF_VALUE: 21
PIF_VALUE: 23
PIF_VALUE: 21
PIF_VALUE: 22
PIF_VALUE: 21
PIF_VALUE: 9
PIF_VALUE: 21
PIF_VALUE: 23
PIF_VALUE: 1
PIF_VALUE: 19
PIF_VALUE: 21
PIF_VALUE: 2
PIF_VALUE: 23
PIF_VALUE: 4
PIF_VALUE: 23
PIF_VALUE: 22

## 2020-12-17 ASSESSMENT — PAIN SCALES - GENERAL
PAINLEVEL_OUTOF10: 0
PAINLEVEL_OUTOF10: 0
PAINLEVEL_OUTOF10: 10
PAINLEVEL_OUTOF10: 0
PAINLEVEL_OUTOF10: 10

## 2020-12-17 ASSESSMENT — PAIN - FUNCTIONAL ASSESSMENT: PAIN_FUNCTIONAL_ASSESSMENT: 0-10

## 2020-12-17 NOTE — PROGRESS NOTES
Report received from recovery RN. Will give report to night shift RN. Q30 min checks resumed at [address].    Faith Mayen RN

## 2020-12-17 NOTE — PROGRESS NOTES
Daily Progress Note  Neuro Critical Care    Patient Name: Francella Boxer  Patient : 2000  Room/Bed: 5025/3225-91  Code Status: Full Code  Allergies: Allergies   Allergen Reactions    Latex        CHIEF COMPLAINT:     Headache for 1 week. 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 Plan for possible intervention tomorrow. 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 Femoral sheath left in place. Plan for possible intervention tomorrow. Last 24h:   Patient refers patients 1 complained of low back pain due to constant lying on her back. Patient is denying any fever, chills, shortness breath, chest pain, abdominal pain, nausea/vomiting, numbness/tingling, or weakness. Patient was able to tolerate normal diet last night and has been n.p.o. since midnight.       CURRENT MEDICATIONS:  SCHEDULED MEDICATIONS:   lidocaine  1 patch Transdermal Daily    sodium chloride flush  10 mL Intravenous 2 times per day    niMODipine  60 mg Oral 6 times per day    buPROPion  75 mg Oral Nightly    fluticasone  2 puff Inhalation BID    levonorgestrel-ethinyl estradiol  1 tablet Oral Daily    montelukast  10 mg Oral Nightly    sertraline  25 mg Oral Nightly    insulin lispro  0-6 Units Subcutaneous TID WC    insulin lispro  0-3 Units Subcutaneous Nightly    levetiracetam  500 mg Intravenous Q12H     CONTINUOUS INFUSIONS:   sodium chloride 75 mL/hr at 20 9433    niCARdipine Stopped (20 2301)    dextrose       PRN MEDICATIONS: sodium chloride flush, promethazine **OR** ondansetron, acetaminophen **OR** acetaminophen, senna, glucose, dextrose, glucagon (rDNA), dextrose, fentanNYL, acetaminophen, promethazine **OR** ondansetron    VITALS:  Temperature Range: Temp: 98.2 °F (36.8 °C) Temp  Av.2 °F (36.8 °C)  Min: 97.9 °F (36.6 °C)  Max: 98.6 °F (37 °C)  BP Range: Systolic (00QRL), GAU:093 , Min:85 , AHA:129     Diastolic (74ZKZ), YWY:84, Min:35, Max:70    Pulse Range: Pulse  Av.9  Min: 77  Max: 117  Respiration Range: Resp  Av.6  Min: 0  Max: 25  Current Pulse Ox: SpO2: 97 %  24HR Pulse Ox Range: SpO2  Av.5 %  Min: 94 %  Max: 100 %  Patient Vitals for the past 12 hrs:   BP Temp Temp src Pulse Resp SpO2   20 0600 113/66   90 16 97 %   20 0500 (!) 106/54   93 15 97 %   20 0400 121/64 98.2 °F (36.8 °C) Oral 97 17 97 %   20 0300 111/62   95 19 97 %   20 0200 (!) 101/59   94 17 97 %   20 0100 114/70   97 21 97 %   20 0000 115/60 98.6 °F (37 °C) Oral 103 19 96 %   20 2300 (!) 114/45   106 25 97 %   20 2245    105 20 96 %   20 2230 (!) 110/49   109 22 97 %   20 2215 (!) 122/53   107 19 97 %   20 2200 (!) 116/55   105 20 97 %   20 2145 (!) 123/59   110 22 96 %   20 2130    110 16 96 %   20 2115    117 21 97 %   20 2100 (!) 113/49   106 20 96 %   12/16/20 2045    104 18 97 %   20    111 21 97 %   20    100 20 96 %   20 (!) 104/50 98.4 °F (36.9 °C) Oral 104 19 97 %   20 1930    104 18 96 %   20 1915    109 20 95 %   20 190 (!) 112/35   107 20 95 %   20 1845    106 21 96 %   20 1830    108 18 95 %     Estimated body mass index is 36.21 kg/m² as calculated from the following:    Height as of this encounter: 5' 2\" (1.575 m).     Weight as of this encounter: 197 lb 15.6 oz (89.8 kg).  []<16 Severe malnutrition []9926.69 Moderate malnutrition  []1718.49 Mild malnutrition  []18.524.9 Normal  []2529.9 Overweight (not obese)  []3034.9 Obese class 1 (Low Risk)  [x]3539.9 Obese class 2 (Moderate Risk)  []?40 Obese class 3 (High Risk)    RECENT LABS:   Lab Results   Component Value Date    WBC 12.0 12/16/2020    HGB 12.4 12/16/2020    HCT 38.2 12/16/2020     12/16/2020    ALT 11 12/20/2015    AST 17 12/20/2015     12/16/2020    K 3.6 (L) 12/16/2020     12/16/2020    CREATININE 0.45 (L) 12/16/2020    BUN 6 12/16/2020    CO2 17 (L) 12/16/2020    TSH 1.50 11/06/2020    INR 1.1 12/15/2020    LABA1C 4.9 11/06/2020     24 HOUR INTAKE/OUTPUT:    Intake/Output Summary (Last 24 hours) at 12/17/2020 6621  Last data filed at 12/17/2020 0500  Gross per 24 hour   Intake 1432 ml   Output 2285 ml   Net -853 ml       IMAGING:   Echo Complete 2d W Doppler W Color    Result Date: 12/17/2020 Transthoracic Echocardiography Report (TTE)  Patient Name DEANA     Date of Study             12/17/2020               Deepak Martinez   Date of      2000  Gender                    Female  Birth   Age          23 year(s)  Race                         Room Number  5280        Height:                   62 inch, 157.48 cm   Corporate ID [id number]    Weight:                   197 pounds, 89.4 kg  #   Patient Acct [de-identified]   BSA:         1.9 m^2      BMI:       36.03 kg/m^2  #   MR #         [id number]     Sonographer               Aga Casey   Accession #  [id number]  Interpreting Physician    [address]   Fellow                   Referring Nurse                           Practitioner   Interpreting             Referring Physician       Jonathon Ibarra  Fellow                                             APRN-CNP  Type of Study   TTE procedure:2D Echocardiogram, M-Mode, Doppler, Color Doppler. Procedure Date Date: 12/17/2020 Start: 08:05 AM Study Location: [facility] Technical Quality: Adequate visualization Indications:Cardiac Evaluation. History / Tech. Comments: Procedure explained to patient. Smoker Patient Status: Inpatient Height: 62 inches Weight: 197 pounds BSA: 1.9 m^2 BMI: 36.03 kg/m^2 CONCLUSIONS Summary Left ventricle is normal in size, normal wall thickness, global left ventricular systolic function is normal. Calculated ejection fraction is 61%. Left atrium is normal in size. Inter-atrial septum is intact with no evidence for an atrial septal defect, by color doppler. Trivial mitral regurgitation. Tricuspid valve was not well visualized. Trivial tricuspid regurgitation. Estimated right ventricular systolic pressure is 32 mmHg.  Signature ----------------------------------------------------------------------------  Electronically signed by Shavon UriasSonographer) on 12/17/2020 11:44 AM ---------------------------------------------------------------------------- ----------------------------------------------------------------------------  Electronically signed by Franklin OwensInterpreting physician) on 12/17/2020  12:05 PM ---------------------------------------------------------------------------- FINDINGS Left Atrium Left atrium is normal in size. Inter-atrial septum is intact with no evidence for an atrial septal defect, by color doppler. Left Ventricle Left ventricle is normal in size, normal wall thickness, global left ventricular systolic function is normal, calculated ejection fraction is 61%. Right Atrium Right atrium is normal in size. Right Ventricle Normal right ventricular size and function. Mitral Valve Mitral valve sclerosis without stenosis. Trivial mitral regurgitation. Aortic Valve Aortic valve is trileaflet. No evidence of aortic insufficiency or stenosis. Tricuspid Valve Tricuspid valve was not well visualized. Trivial tricuspid regurgitation. Estimated right ventricular systolic pressure is 32 mmHg. Pulmonic Valve Pulmonic valve not well visualized but Doppler velocities are normal. Trivial pulmonic insufficiency. Pericardial Effusion No significant pericardial effusion is seen. Miscellaneous Normal aortic root dimension. E/E' average = 4.85. IVC normal diameter & inspiratory collapse indicating normal RA filling pressure .  M-mode / 2D Measurements & Calculations:   LVIDd:5.3 cm(3.7 - 5.6 cm)        Diastolic XPOQVD:22.5 ml  YLYX:0.0 cm(0.6 - 1.1 cm)         Systolic ERZOMM:49.8 ml  KWLBA:4.9 cm(0.6 - 1.1 cm)        Aortic Root:2.5 cm(2.0 - 3.7 cm)                                    LA Dimension: 3.3 cm(1.9 - 4.0 cm)  Calculated LVEF (%): 61.56 %      LA volume/Index: 47.3 ml /25m^2                                    LVOT:2.1 cm                                    RVDd:2.6 cm   Mitral:                                 Aortic   Valve Area (P1/2-Time): 4.58 cm^2       Peak Velocity: 1.48 m/s  Peak E-Wave: 0.78 m/s                   Mean Velocity: 1.05 m/s Peak A-Wave: 0.48 m/s                   Peak Gradient: 8.76 mmHg  E/A Ratio: 1.63                         Mean Gradient: 5 mmHg  Peak Gradient: 2.41 mmHg  Mean Gradient: 2 mmHg  Deceleration Time: 180 msec             Area (continuity): 3.02 cm^2  P1/2t: 48 msec                          AV VTI: 27.6 cm   Area (continuity): 3.69 cm^2  Mean Velocity: 0.71 m/s   Tricuspid:                              Pulmonic:   Estimated RVSP: 32 mmHg                 Peak Velocity: 1.25 m/s  Peak TR Velocity: 2.62 m/s              Peak Gradient: 6.25 mmHg  Peak TR Gradient: 27.4576 mmHg  Diastology / Tissue Doppler Septal Wall E' velocity:0.16 m/s Septal Wall E/E':4.8 Lateral Wall E' velocity:0.16 m/s Lateral Wall E/E':4.9    Ct Head Wo Contrast    Result Date: 12/15/2020 EXAMINATION: CT OF THE HEAD WITHOUT CONTRAST  12/15/2020 8:29 pm TECHNIQUE: CT of the head was performed without the administration of intravenous contrast. Dose modulation, iterative reconstruction, and/or weight based adjustment of the mA/kV was utilized to reduce the radiation dose to as low as reasonably achievable. COMPARISON: None. HISTORY: ORDERING SYSTEM PROVIDED HISTORY: headache TECHNOLOGIST PROVIDED HISTORY: headache Is the patient pregnant?->No Reason for Exam: Migraine for 1 week Acuity: Acute Type of Exam: Initial FINDINGS: BRAIN/VENTRICLES: Abnormal increased attenuation is seen within the prepontine cistern, suspicious or prepontine hemorrhage. No hemorrhage is identified within the basilar cisterns, or interpeduncular cistern. No subarachnoid hemorrhage is seen over the cerebral convexities. .  The gray-white differentiation is maintained without evidence of an acute infarct. There is no evidence of hydrocephalus. No mass effect is present. A partially calcified 9 mm aneurysm is present involving the left carotid terminus. ORBITS: The visualized portion of the orbits demonstrate no acute abnormality. SINUSES: The visualized paranasal sinuses and mastoid air cells demonstrate no acute abnormality. SOFT TISSUES/SKULL:  No acute abnormality of the visualized skull or soft tissues. 1. Abnormally increased attenuation within the prepontine cistern, suspicious for hemorrhage. 2. Partially calcified 9.3 cm aneurysm involving the left carotid terminus.  3.  Critical results were called by Dr. Liz Otero to TRAVIS CARDOZO [facility], [place], operated by [facility] on 12/15/2020 at 21:14. RECOMMENDATIONS: Dedicated CTA of the intracranial circulation recommended for further evaluation     Cta Head W Contrast    Result Date: 12/15/2020 EXAMINATION: CTA OF THE HEAD WITH CONTRAST 12/15/2020 10:40 pm: TECHNIQUE: CTA of the head/brain was performed with the administration of intravenous contrast. Multiplanar reformatted images are provided for review. MIP images are provided for review. Dose modulation, iterative reconstruction, and/or weight based adjustment of the mA/kV was utilized to reduce the radiation dose to as low as reasonably achievable. COMPARISON: CT head without contrast December 15, 2020 at 2138 hours HISTORY: ORDERING SYSTEM PROVIDED HISTORY: headache, abnormal Ct TECHNOLOGIST PROVIDED HISTORY: headache, abnormal Ct Reason for Exam: Headache for 1 week, abnormal CT, no prior surgeries to head Acuity: Acute Type of Exam: Initial FINDINGS: ANTERIOR CIRCULATION: No significant stenosis of the intracranial internal carotid, anterior cerebral, or middle cerebral arteries. No aneurysm. Tortuosity of the supraclinoid left ICA is present. POSTERIOR CIRCULATION: No significant stenosis of the vertebral, basilar, or posterior cerebral arteries. No aneurysm. OTHER: No dural venous sinus thrombosis on this non-dedicated study. BRAIN: No mass effect or midline shift. No extra-axial fluid collection. The gray-white differentiation is maintained. Left internal carotid artery supraclinoid tortuosity, which simulated appearance of aneurysm on noncontrast evaluation. Otherwise, unremarkable CT angiogram of the head. Labs and Images reviewed with:  [] Dr. Anabelle Singleton. Anya    [x] Dr. Brigitte Couch  [] Dr. Jak Calix  [] There are no new interval images to review. PHYSICAL EXAM       CONSTITUTIONAL:  Well developed, well nourished, alert and oriented x 3, in no acute distress. GCS 15. Nontoxic. No dysarthria. No aphasia.    HEAD:  normocephalic, atraumatic    EYES:  PERRLA, EOMI.   ENT:  moist mucous membranes   NECK:  supple, symmetric   LUNGS:  Equal air entry bilaterally   CARDIOVASCULAR:  normal s1 / s2, RRR, distal pulses intact ABDOMEN:  Soft, no rigidity   NEUROLOGIC:  Mental Status:  A & O x3,awake             Cranial Nerves:    cranial nerves II-XII are grossly intact    Motor Exam:    Drift:  absent  Tone:  normal    Motor exam is symmetrical 5 out of 5 all extremities bilaterally    Sensory:    Touch:    Right Upper Extremity:  normal  Left Upper Extremity:  normal  Right Lower Extremity:  normal  Left Lower Extremity:  normal    Deep Tendon Reflexes:    Right Bicep:  2+  Left Bicep:  2+  Right Knee:  2+  Left Knee:  2+    Plantar Response:  Right:  downgoing  Left:  downgoing    Clonus:  N/A  Crowley's:  N/A    Coordination/Dysmetria:  Heel to Shin:  Right:  normal  Finger to Nose:   Right:  normal   Dysdiadochokinesia:  N/A    Gait:  Could not evaluate due to femoral sheath in place     DRAINS:  [x] There are no drains for Neuro Critical Care to monitor at this time. ASSESSMENT AND PLAN:       57-year-old female presents with a history of 1 week of right-sided headaches with gradual onset. There is associated photophobia and mild dizziness with this headache and no associated fevers, chills, diaphoresis, numbness/tingling, weakness, neck pain or back pain. CT head and CTA head and neck revealed concern for aneurysm and patient was transferred for neuro endovascular team to evaluate. Care plan has been discussed with attending and patient.     NEUROLOGIC:  - Possible endo-vascular intervention today  - AEDs    -Keppra 500 mg BID today and tomorrow  - Goal SBP < 140 mmHg  - Nimodipine 60 mg Q4H  - Neuro checks per protocol    CARDIOVASCULAR:  - Goal SBP < 140 mmHg  - Continue telemetry    PULMONARY:  - encourage incentive inspirometry    RENAL/FLUID/ELECTROLYTE:  - BUN 7/ Creatinine 0.50  - Urine output 1.1 ml/kg/hr  - IVF: NS @ 75 mL/hr  - Replace electrolytes PRN  - Daily BMP    GI/NUTRITION:  NUTRITION:  Diet NPO Effective Now  - Bowel regimen: senna;  - GI prophylaxis: none needed    ID:  - Tmax 37  - WBC 8.8  - Daily CBC HEME:   - H&H 11.1  - Platelets 640  - Daily CBC    ENDOCRINE:  - Continue to monitor blood glucose, goal <180  - SSI    OTHER:  - PT/OT/ST to be done after neuro endovascular intervention  - Code Status: Full Code    PROPHYLAXIS:  Stress ulcer: none    DVT PROPHYLAXIS:  - SCD sleeves - Thigh High   - No chemoprophylaxis anticoagulation at this time. DISPOSITION:  [x] To remain ICU    We will continue to follow along. For any changes in exam or patient status please contact Neuro Critical Care.       Nehemias Lopez DO  Neuro Critical Care  EM PGY-2  12/17/2020     6:28 AM

## 2020-12-17 NOTE — ANESTHESIA PRE PROCEDURE
Department of Anesthesiology  Preprocedure Note       Name:  Francine Mondragon   Age:  23 y.o.  :  2000                                          MRN:  [id number]         Date:  2020      Surgeon: * No surgeons listed *    Procedure: * No procedures listed *    Medications prior to admission:   Prior to Admission medications    Medication Sig Start Date End Date Taking? Authorizing Provider   metFORMIN (GLUCOPHAGE-XR) 500 MG extended release tablet Take 500 mg by mouth daily (with breakfast)    Historical Provider, MD   montelukast (SINGULAIR) 10 MG tablet Take 10 mg by mouth nightly    Historical Provider, MD   sertraline (ZOLOFT) 25 MG tablet Take 25 mg by mouth nightly    Historical Provider, MD   buPROPion (WELLBUTRIN XL) 150 MG extended release tablet Take 75 mg by mouth nightly    Historical Provider, MD   loratadine (CLARITIN) 10 MG tablet Take 10 mg by mouth nightly    Historical Provider, MD   levonorgestrel-ethinyl estradiol (SEASONALE) 0.15-0.03 MG per tablet Take 1 tablet by mouth daily 20   LETY Moreno - CNP   fluticasone (FLOVENT HFA) 110 MCG/ACT inhaler Inhale 2 puffs into the lungs 2 times daily 1/3/19 1/3/20  Ana Maria Saha MD       Current medications:    No current facility-administered medications for this visit. No current outpatient medications on file.      Facility-Administered Medications Ordered in Other Visits   Medication Dose Route Frequency Provider Last Rate Last Admin    fentaNYL (SUBLIMAZE) injection 25 mcg  25 mcg Intravenous Once Alberto Pathak MD        HYDROmorphone (DILAUDID) injection 0.5 mg  0.5 mg Intravenous Once Alberto Pathak MD        lactated ringers infusion   Intravenous Continuous Alberto Pathak MD        sodium chloride flush 0.9 % injection 10 mL  10 mL Intravenous 2 times per day Alberto Pathak MD        sodium chloride flush 0.9 % injection 10 mL  10 mL Intravenous PRN Alberto Pathak MD  ondansetron (ZOFRAN) injection 4 mg  4 mg Intravenous Once Andreia Alvarenga MD        midazolam PF (VERSED) injection 2 mg  2 mg Intravenous Once PRN Andreia Alvarenga MD        lidocaine 4 % external patch 1 patch  1 patch Transdermal Daily Jacobo Balint, DO   1 patch at 12/17/20 5141    sodium chloride flush 0.9 % injection 10 mL  10 mL Intravenous 2 times per day Jacobo Balint, DO        sodium chloride flush 0.9 % injection 10 mL  10 mL Intravenous PRN Jacobo Balint, DO        promethazine (PHENERGAN) tablet 12.5 mg  12.5 mg Oral Q6H PRN Jacobo Balint, DO        Or    ondansetron [facility]) injection 4 mg  4 mg Intravenous Q6H PRN Jacobo Balint, DO        acetaminophen (TYLENOL) tablet 650 mg  650 mg Oral Q6H PRN Jacobo Balint, DO        Or    acetaminophen (TYLENOL) suppository 650 mg  650 mg Rectal Q6H PRN Jacobo Balint, DO        0.9 % sodium chloride infusion   Intravenous Continuous Jacobo Balint, DO 75 mL/hr at 12/16/20 0453 New Bag at 12/16/20 0453    senna (SENOKOT) tablet 8.6 mg  1 tablet Oral Daily PRN Jacobo Balint, DO        niMODipine (NIMOTOP) capsule 60 mg  60 mg Oral 6 times per day Jacobo Balint, DO   60 mg at 12/17/20 1057    niCARdipine (CARDENE) 25 mg in dextrose 5 % 250 mL infusion  5 mg/hr Intravenous Continuous Jacobo Balint, DO   Stopped at 12/16/20 2301    buPROPion (WELLBUTRIN) tablet 75 mg  75 mg Oral Nightly Jacobo Balint, DO   75 mg at 12/16/20 2122    fluticasone (FLOVENT HFA) 110 MCG/ACT inhaler 2 puff  2 puff Inhalation BID Jacobo Balint, DO   2 puff at 12/17/20 9139    levonorgestrel-ethinyl estradiol (SEASONALE) 0.15-0.03 MG per tablet 1 tablet  1 tablet Oral Daily Jacobo Balint, DO        montelukast (SINGULAIR) tablet 10 mg  10 mg Oral Nightly Jacobo Balint, DO   10 mg at 12/16/20 2122    sertraline (ZOLOFT) tablet 25 mg  25 mg Oral Nightly Jacobo Chakraborty DO   25 mg at 12/16/20 2247  insulin lispro (HUMALOG) injection vial 0-6 Units  0-6 Units Subcutaneous TID WC [place] Dragon, DO        insulin lispro (HUMALOG) injection vial 0-3 Units  0-3 Units Subcutaneous Nightly [place] Dragon, DO        glucose (GLUTOSE) 40 % oral gel 15 g  15 g Oral PRN [place] Dragon, DO        dextrose 50 % IV solution  12.5 g Intravenous PRN Wilson Dragon, DO        glucagon (rDNA) injection 1 mg  1 mg Intramuscular PRN [place] Dragon, DO        dextrose 5 % solution  100 mL/hr Intravenous PRN [place] Dragon, DO        fentaNYL (SUBLIMAZE) injection 50 mcg  50 mcg Intravenous Q5 Min PRN Lester Dodson MD   50 mcg at 12/17/20 0900    levetiracetam (KEPPRA) 500 mg/100 mL IVPB  500 mg Intravenous Q12H Ronney Goodpasture, APRN - CNP   Stopped at 12/17/20 0849    acetaminophen (TYLENOL) tablet 650 mg  650 mg Oral Q4H PRN Cipriano Sanchez MD        promethazine (PHENERGAN) tablet 12.5 mg  12.5 mg Oral Q6H PRN Cipriano Sanchez MD        Or    ondansetron [facility]) injection 4 mg  4 mg Intravenous Q6H PRN Cipriano Sanchez MD           Allergies: Allergies   Allergen Reactions    Latex        Problem List:    Patient Active Problem List   Diagnosis Code    Moderate episode of recurrent major depressive disorder ([facility] [address] F33.1    Unintended weight gain R63.5    Obesity (BMI 30.0-34. 9) E66.9    Chronic fatigue R53.82    Moderate persistent asthma without complication X39.33    Hyperglycemia R73.9    LGSIL on Pap smear of cervix R87.612    Subarachnoid hemorrhage (HCC) I60.9    Aneurysm of internal carotid artery I67.1       Past Medical History:        Diagnosis Date    Aneurysm of internal carotid artery     Asthma     [facility] 1/7/2019       Past Surgical History:        Procedure Laterality Date    EYE SURGERY      lazy eye       Social History:    Social History     Tobacco Use    Smoking status: Current Some Day Smoker     Types: Cigarettes  Smokeless tobacco: Never Used   Substance Use Topics    Alcohol use: Not Currently     Comment: occaisonally                                Ready to quit: Not Answered  Counseling given: Not Answered      Vital Signs (Current): There were no vitals filed for this visit.                                            BP Readings from Last 3 Encounters:   12/17/20 121/75   12/16/20 (!) 95/55   12/16/20 117/74       NPO Status:                                                                                 BMI:   Wt Readings from Last 3 Encounters:   12/16/20 197 lb 15.6 oz (89.8 kg)   12/15/20 190 lb (86.2 kg)   12/08/20 192 lb (87.1 kg)     There is no height or weight on file to calculate BMI.    CBC:   Lab Results   Component Value Date    WBC 8.8 12/17/2020    RBC 4.06 12/17/2020    HGB 11.1 12/17/2020    HCT 34.8 12/17/2020    MCV 85.7 12/17/2020    RDW 13.1 12/17/2020     12/17/2020       CMP:   Lab Results   Component Value Date     12/17/2020    K 3.4 12/17/2020     12/17/2020    CO2 19 12/17/2020    BUN 7 12/17/2020    CREATININE 0.50 12/17/2020    GFRAA >60 12/17/2020    LABGLOM >60 12/17/2020    GLUCOSE 138 12/17/2020    PROT 7.9 12/20/2015    CALCIUM 8.0 12/17/2020    BILITOT 0.35 12/20/2015    ALKPHOS 79 12/20/2015    AST 17 12/20/2015    ALT 11 12/20/2015       POC Tests:   Recent Labs     12/17/20  1205   POCGLU 76       Coags:   Lab Results   Component Value Date    PROTIME 13.8 12/15/2020    INR 1.1 12/15/2020    APTT 29.9 12/15/2020       HCG (If Applicable):   Lab Results   Component Value Date    PREGTESTUR neg 12/15/2020    HCG NEGATIVE 12/15/2020        ABGs: No results found for: PHART, PO2ART, ZNA8SIW, VXT1CGV, BEART, R2ZULQKI     Type & Screen (If Applicable):  No results found for: LABABO, LABRH    Drug/Infectious Status (If Applicable):  No results found for: HIV, HEPCAB    COVID-19 Screening (If Applicable):   Lab Results   Component Value Date COVID19 Not Detected 12/16/2020         Anesthesia Evaluation  Patient summary reviewed no history of anesthetic complications:   Airway: Mallampati: II  TM distance: >3 FB   Neck ROM: full  Mouth opening: > = 3 FB Dental: normal exam         Pulmonary: breath sounds clear to auscultation  (+) asthma:                            Cardiovascular:Negative CV ROS  Exercise tolerance: good (>4 METS),       (-) past MI, CAD, CABG/stent, dysrhythmias and  CHF    ECG reviewed  Rhythm: regular  Rate: normal  Echocardiogram reviewed         Beta Blocker:  Not on Beta Blocker         Neuro/Psych:   (+) CVA:, psychiatric history: stable without treatment            GI/Hepatic/Renal: Neg GI/Hepatic/Renal ROS            Endo/Other:    (+) Diabetes, . Abdominal:       Abdomen: soft. Vascular:                                          Anesthesia Plan      general     ASA 3       Induction: intravenous. arterial line  MIPS: Postoperative opioids intended and Prophylactic antiemetics administered. Anesthetic plan and risks discussed with patient. Use of blood products discussed with patient whom consented to blood products.    Plan discussed with CRNA and surgical team.                Keena Billings MD   12/17/2020

## 2020-12-17 NOTE — PLAN OF CARE
Problem: Pain:  Goal: Pain level will decrease  Description: Pain level will decrease  12/17/2020 0644 by Sarthak Case RN  Outcome: Ongoing  12/16/2020 1935 by Paulino Lira RN  Outcome: Ongoing  Goal: Control of acute pain  Description: Control of acute pain  12/17/2020 0644 by Sarthak Case RN  Outcome: Ongoing  12/16/2020 1935 by Paulino Lira RN  Outcome: Ongoing  Goal: Control of chronic pain  Description: Control of chronic pain  12/17/2020 0644 by Sarthak Case RN  Outcome: Ongoing  12/16/2020 1935 by Paulino Lira RN  Outcome: Ongoing     Problem: Discharge Planning:  Goal: Discharged to appropriate level of care  Description: Discharged to appropriate level of care  12/17/2020 0644 by Sarthak Case RN  Outcome: Ongoing  12/16/2020 1935 by Paulino Lira RN  Outcome: Ongoing     Problem: Autonomic Dysreflexia - Risk of:  Goal: Absence of autonomic dysreflexia signs and symptoms  Description: Absence of autonomic dysreflexia signs and symptoms  12/17/2020 0644 by Sarthak Case RN  Outcome: Ongoing  12/16/2020 1935 by Paulino Lira RN  Outcome: Ongoing     Problem: Injury - Risk of, Healthcare-Acquired Condition:  Goal: Absence of healthcare acquired conditions  Description: Absence of healthcare acquired conditions  12/17/2020 0644 by Sarthak Case RN  Outcome: Ongoing  12/16/2020 1935 by Paulino Lira RN  Outcome: Met This Shift  Goal: Will remain free from falls  Description: Will remain free from falls  12/17/2020 0644 by Sarthak Case RN  Outcome: Ongoing  12/16/2020 1935 by Paulino Lira RN  Outcome: Met This Shift  Goal: Absence of pressure ulcer  Description: Absence of pressure ulcer  12/17/2020 0644 by Sarthak Case RN  Outcome: Ongoing  12/16/2020 1935 by Paulino Lira RN  Outcome: Met This Shift  Goal: Demonstration of wound healing without infection will improve  Description: Demonstration of wound healing without infection will improve 12/17/2020 0644 by Beau Allred RN  Outcome: Ongoing  12/16/2020 1935 by Bernard Guerrero RN  Outcome: Met This Shift  Goal: Absence of urinary tract infection signs and symptoms  Description: Absence of urinary tract infection signs and symptoms  12/17/2020 0644 by Beau Allred RN  Outcome: Ongoing  12/16/2020 1935 by Bernard Guerrero RN  Outcome: Met This Shift     Problem: Psychosocial Distress:  Goal: Absence of psychosocial distress  Description: Absence of psychosocial distress  12/17/2020 0644 by Beau Allred RN  Outcome: Ongoing  12/16/2020 1935 by Bernard Guerrero RN  Outcome: Met This Shift  Goal: Ability to cope will improve  Description: Ability to cope will improve  12/17/2020 0644 by Beau Allred RN  Outcome: Ongoing  12/16/2020 1935 by Bernard Guerrero RN  Outcome: Met This Shift  Goal: Ability to identify and utilize available support systems will improve  Description: Ability to identify and utilize available support systems will improve  12/17/2020 0644 by Beau Allred RN  Outcome: Ongoing  12/16/2020 1935 by Bernard Guerrero RN  Outcome: Met This Shift     Problem: Bleeding:  Goal: Will show no signs and symptoms of excessive bleeding  Description: Will show no signs and symptoms of excessive bleeding  12/17/2020 0644 by Beau Allred RN  Outcome: Ongoing  12/16/2020 1935 by Bernard Guerrero RN  Outcome: Met This Shift

## 2020-12-17 NOTE — PLAN OF CARE
Neuro assessment completed, fall precautions in place, aspirations precautions in place, assess for barriers in communication and mobility, interventions to assist in communication and mobility in place, encouraged to call for assistance, adaptive devices used as needed, assess emotional state and support offered, encouraged patient to communicate by available means, and support systems included in patient care. Patient remained free from injury. Patient verbalized understanding of need for the safety precautions. Demonstrates proper use of assistive devices. Bed remains in the lowest position. Call light remains within reach. Falling Star Program in use. Pain level assessment complete. Pt rated pain at 10/10. Pt educated on pain scale and control interventions. PRN pain medication given per pt request. Pt instructed to call out with new onset of pain or unrelieved pain. Will continue to monitor.       Problem: Injury - Risk of, Healthcare-Acquired Condition:  Goal: Absence of healthcare acquired conditions  Description: Absence of healthcare acquired conditions  Outcome: Met This Shift  Goal: Will remain free from falls  Description: Will remain free from falls  Outcome: Met This Shift  Goal: Absence of pressure ulcer  Description: Absence of pressure ulcer  Outcome: Met This Shift  Goal: Demonstration of wound healing without infection will improve  Description: Demonstration of wound healing without infection will improve  Outcome: Met This Shift  Goal: Absence of urinary tract infection signs and symptoms  Description: Absence of urinary tract infection signs and symptoms  Outcome: Met This Shift     Problem: Psychosocial Distress:  Goal: Absence of psychosocial distress  Description: Absence of psychosocial distress  12/16/2020 1935 by Bernard Guerrero RN  Outcome: Met This Shift  12/16/2020 0538 by Billie Mercado RN  Outcome: Ongoing  Goal: Ability to cope will improve Description: Ability to cope will improve  12/16/2020 1935 by Sonali Bonilla RN  Outcome: Met This Shift  12/16/2020 0538 by Samaria Morales RN  Outcome: Ongoing  Goal: Ability to identify and utilize available support systems will improve  Description: Ability to identify and utilize available support systems will improve  12/16/2020 1935 by Sonali Bonilla RN  Outcome: Met This Shift  12/16/2020 0538 by Samaria Morales RN  Outcome: Ongoing     Problem: Bleeding:  Goal: Will show no signs and symptoms of excessive bleeding  Description: Will show no signs and symptoms of excessive bleeding  12/16/2020 1935 by Sonali Bonilla RN  Outcome: Met This Shift  12/16/2020 0540 by Samaria Morales RN  Outcome: Ongoing     Problem: Pain:  Goal: Pain level will decrease  Description: Pain level will decrease  12/16/2020 1935 by Sonali Bonilla RN  Outcome: Ongoing  12/16/2020 0538 by Samaria Morales RN  Outcome: Ongoing  Goal: Control of acute pain  Description: Control of acute pain  12/16/2020 1935 by Sonali Bonilla RN  Outcome: Ongoing  12/16/2020 0538 by Samaria Morales RN  Outcome: Ongoing  Goal: Control of chronic pain  Description: Control of chronic pain  12/16/2020 1935 by Sonali Bonilla RN  Outcome: Ongoing  12/16/2020 0538 by Samaria Morales RN  Outcome: Ongoing     Problem: Discharge Planning:  Goal: Discharged to appropriate level of care  Description: Discharged to appropriate level of care  12/16/2020 1935 by Sonali Bonilla RN  Outcome: Ongoing  12/16/2020 0538 by Samaria Morales RN  Outcome: Ongoing     Problem: Autonomic Dysreflexia - Risk of:  Goal: Absence of autonomic dysreflexia signs and symptoms  Description: Absence of autonomic dysreflexia signs and symptoms  12/16/2020 1935 by Sonali Bonilla RN  Outcome: Ongoing  12/16/2020 0538 by Samaria Morales RN  Outcome: Ongoing

## 2020-12-18 LAB
ANION GAP SERPL CALCULATED.3IONS-SCNC: 13 MMOL/L (ref 9–17)
BUN BLDV-MCNC: 6 MG/DL (ref 6–20)
BUN/CREAT BLD: ABNORMAL (ref 9–20)
CALCIUM SERPL-MCNC: 8 MG/DL (ref 8.6–10.4)
CHLORIDE BLD-SCNC: 107 MMOL/L (ref 98–107)
CO2: 19 MMOL/L (ref 20–31)
COLLAGEN ADENOSINE-5'-DIPHOSPHATE (ADP) TIME: 83 SEC (ref 67–112)
COLLAGEN EPINEPHRINE TIME: 191 SEC (ref 85–172)
CREAT SERPL-MCNC: 0.46 MG/DL (ref 0.5–0.9)
GFR AFRICAN AMERICAN: >60 ML/MIN
GFR NON-AFRICAN AMERICAN: >60 ML/MIN
GFR SERPL CREATININE-BSD FRML MDRD: ABNORMAL ML/MIN/{1.73_M2}
GFR SERPL CREATININE-BSD FRML MDRD: ABNORMAL ML/MIN/{1.73_M2}
GLUCOSE BLD-MCNC: 123 MG/DL (ref 65–105)
GLUCOSE BLD-MCNC: 133 MG/DL (ref 70–99)
GLUCOSE BLD-MCNC: 90 MG/DL (ref 65–105)
HCT VFR BLD CALC: 32.9 % (ref 36.3–47.1)
HEMOGLOBIN: 10.4 G/DL (ref 11.9–15.1)
MCH RBC QN AUTO: 27.4 PG (ref 25.2–33.5)
MCHC RBC AUTO-ENTMCNC: 31.6 G/DL (ref 28.4–34.8)
MCV RBC AUTO: 86.8 FL (ref 82.6–102.9)
NRBC AUTOMATED: 0 PER 100 WBC
PARTIAL THROMBOPLASTIN TIME: 22.8 SEC (ref 20.5–30.5)
PARTIAL THROMBOPLASTIN TIME: 25.5 SEC (ref 20.5–30.5)
PARTIAL THROMBOPLASTIN TIME: 29 SEC (ref 20.5–30.5)
PDW BLD-RTO: 13.3 % (ref 11.8–14.4)
PLATELET # BLD: 251 K/UL (ref 138–453)
PLATELET FUNCTION INTERP: ABNORMAL
PMV BLD AUTO: 11.8 FL (ref 8.1–13.5)
POTASSIUM SERPL-SCNC: 3.7 MMOL/L (ref 3.7–5.3)
RBC # BLD: 3.79 M/UL (ref 3.95–5.11)
SODIUM BLD-SCNC: 139 MMOL/L (ref 135–144)
WBC # BLD: 8.4 K/UL (ref 4.5–13.5)

## 2020-12-18 PROCEDURE — 99232 SBSQ HOSP IP/OBS MODERATE 35: CPT | Performed by: PSYCHIATRY & NEUROLOGY

## 2020-12-18 PROCEDURE — 94761 N-INVAS EAR/PLS OXIMETRY MLT: CPT

## 2020-12-18 PROCEDURE — 2700000000 HC OXYGEN THERAPY PER DAY

## 2020-12-18 PROCEDURE — 6370000000 HC RX 637 (ALT 250 FOR IP): Performed by: NURSE PRACTITIONER

## 2020-12-18 PROCEDURE — 36415 COLL VENOUS BLD VENIPUNCTURE: CPT

## 2020-12-18 PROCEDURE — 6370000000 HC RX 637 (ALT 250 FOR IP): Performed by: STUDENT IN AN ORGANIZED HEALTH CARE EDUCATION/TRAINING PROGRAM

## 2020-12-18 PROCEDURE — 94640 AIRWAY INHALATION TREATMENT: CPT

## 2020-12-18 PROCEDURE — 82947 ASSAY GLUCOSE BLOOD QUANT: CPT

## 2020-12-18 PROCEDURE — 85576 BLOOD PLATELET AGGREGATION: CPT

## 2020-12-18 PROCEDURE — 80048 BASIC METABOLIC PNL TOTAL CA: CPT

## 2020-12-18 PROCEDURE — 85027 COMPLETE CBC AUTOMATED: CPT

## 2020-12-18 PROCEDURE — 2060000000 HC ICU INTERMEDIATE R&B

## 2020-12-18 PROCEDURE — 85730 THROMBOPLASTIN TIME PARTIAL: CPT

## 2020-12-18 PROCEDURE — 99233 SBSQ HOSP IP/OBS HIGH 50: CPT | Performed by: PSYCHIATRY & NEUROLOGY

## 2020-12-18 PROCEDURE — 6360000002 HC RX W HCPCS: Performed by: STUDENT IN AN ORGANIZED HEALTH CARE EDUCATION/TRAINING PROGRAM

## 2020-12-18 RX ORDER — GABAPENTIN 300 MG/1
300 CAPSULE ORAL ONCE
Status: COMPLETED | OUTPATIENT
Start: 2020-12-18 | End: 2020-12-18

## 2020-12-18 RX ORDER — GABAPENTIN 300 MG/1
300 CAPSULE ORAL NIGHTLY
Status: DISCONTINUED | OUTPATIENT
Start: 2020-12-18 | End: 2020-12-19 | Stop reason: HOSPADM

## 2020-12-18 RX ORDER — GABAPENTIN 300 MG/1
300 CAPSULE ORAL NIGHTLY
Status: DISCONTINUED | OUTPATIENT
Start: 2020-12-18 | End: 2020-12-18

## 2020-12-18 RX ORDER — CLOPIDOGREL BISULFATE 75 MG/1
75 TABLET ORAL DAILY
Status: DISCONTINUED | OUTPATIENT
Start: 2020-12-18 | End: 2020-12-19 | Stop reason: HOSPADM

## 2020-12-18 RX ADMIN — ASPIRIN 325 MG: 325 TABLET, COATED ORAL at 10:20

## 2020-12-18 RX ADMIN — SERTRALINE 25 MG: 25 TABLET, FILM COATED ORAL at 21:11

## 2020-12-18 RX ADMIN — GABAPENTIN 300 MG: 300 CAPSULE ORAL at 20:42

## 2020-12-18 RX ADMIN — NIMODIPINE 60 MG: 30 CAPSULE ORAL at 00:34

## 2020-12-18 RX ADMIN — NIMODIPINE 60 MG: 30 CAPSULE ORAL at 03:55

## 2020-12-18 RX ADMIN — BUPROPION HYDROCHLORIDE 75 MG: 75 TABLET, FILM COATED ORAL at 20:48

## 2020-12-18 RX ADMIN — CLOPIDOGREL 75 MG: 75 TABLET, FILM COATED ORAL at 10:20

## 2020-12-18 RX ADMIN — GABAPENTIN 300 MG: 300 CAPSULE ORAL at 10:20

## 2020-12-18 RX ADMIN — HEPARIN SODIUM 11.1 UNITS/KG/HR: 10000 INJECTION, SOLUTION INTRAVENOUS at 00:32

## 2020-12-18 RX ADMIN — NIMODIPINE 60 MG: 30 CAPSULE ORAL at 08:22

## 2020-12-18 RX ADMIN — MONTELUKAST SODIUM 10 MG: 10 TABLET, FILM COATED ORAL at 21:15

## 2020-12-18 RX ADMIN — FLUTICASONE PROPIONATE 2 PUFF: 110 AEROSOL, METERED RESPIRATORY (INHALATION) at 20:25

## 2020-12-18 RX ADMIN — FLUTICASONE PROPIONATE 2 PUFF: 110 AEROSOL, METERED RESPIRATORY (INHALATION) at 08:18

## 2020-12-18 ASSESSMENT — PAIN SCALES - GENERAL
PAINLEVEL_OUTOF10: 0

## 2020-12-18 NOTE — ANESTHESIA POSTPROCEDURE EVALUATION
Department of Anesthesiology  Postprocedure Note    Patient: Heladio Livingston  MRN: [id number]  YOB: 2000  Date of evaluation: 12/17/2020  Time:  8:51 PM     Procedure Summary     Date: 12/17/20 Room / Location: [facility] Special Procedures    Anesthesia Start: 6773 Anesthesia Stop: 9832    Procedure: IR ANGIOGRAM CAROTID CEREBRAL BILATERAL Diagnosis: (Surpass stent/flow diverter LICA)    Scheduled Providers: LETY Kramer - CRNA Responsible Provider: Ayaka Chase MD    Anesthesia Type: general ASA Status: 3          Anesthesia Type: general    Mary Phase I: Mary Score: 9    Mary Phase II:      Last vitals: Reviewed and per EMR flowsheets.        Anesthesia Post Evaluation    Patient location during evaluation: PACU  Patient participation: complete - patient participated  Level of consciousness: awake and alert  Pain score: 0  Airway patency: patent  Nausea & Vomiting: no nausea and no vomiting  Complications: no  Cardiovascular status: hemodynamically stable  Respiratory status: acceptable, spontaneous ventilation and room air  Hydration status: euvolemic

## 2020-12-18 NOTE — PROGRESS NOTES
Endovascular Neurosurgery Progress Note    SUBJECTIVE:   Pt had difficulty sleeping overnight due to persistent back pain from needing to lay flat in bed. Otherwise pt denies new focal symptoms. Review of Systems:  CONSTITUTIONAL:  negative for fevers, chills, fatigue and malaise    EYES:  negative for double vision, blurred vision and photophobia     HEENT:  negative for tinnitus, epistaxis and sore throat    RESPIRATORY:  negative for cough, shortness of breath, wheezing    CARDIOVASCULAR:  negative for chest pain, palpitations, syncope, edema    GASTROINTESTINAL:  negative for nausea, vomiting    GENITOURINARY:  negative for incontinence    MUSCULOSKELETAL:  negative for neck or back pain    NEUROLOGICAL:  Negative for weakness and tingling  negative for headaches and dizziness    PSYCHIATRIC:  negative for anxiety      Review of systems otherwise negative. OBJECTIVE:     Vitals:    12/17/20 2100   BP: 116/70   Pulse: 97   Resp: 27   Temp:    SpO2: 94%        General:  Gen: obese habitus, NAD  HEENT: NCAT, mucosa moist  Cvs: RRR, S1 S2 normal  Resp: symmetric unlabored breathing  Abd: s/nd/nt  Ext: no edema  Skin: no lesions seen, warm and dry. R groin sheath in place.     Neuro:  Gen: awake and alert, oriented x3. Lang/speech: no aphasia or dysarthria. Follows commands. CN: PERRL, EOMI, VFF, V1-3 intact, face symmetric, hearing intact, shoulder shrug symmetric, tongue midline  Motor: grossly 5/5 UE and LE b/l  Sense: LT intact in all 4 ext. Coord: FTN and HTS intact b/l  DTR: deferred  Gait: deferred     NIH Stroke Scale:   1a  Level of consciousness: 0 - alert; keenly responsive   1b. LOC questions:  0 - answers both questions correctly   1c. LOC commands: 0 - performs both tasks correctly   2. Best Gaze: 0 - normal   3. Visual: 0 - no visual loss   4. Facial Palsy: 0 - normal symmetric movement   5a.  Motor left arm: 0 - no drift, limb holds 90 (or 45) degrees for full 10 seconds 5b.  Motor right arm: 0 - no drift, limb holds 90 (or 45) degrees for full 10 seconds   6a. Motor left le - no drift; leg holds 30 degree position for full 5 seconds   6b  Motor right le - no drift; leg holds 30 degree position for full 5 seconds   7. Limb Ataxia: 0 - absent   8. Sensory: 0 - normal; no sensory loss   9. Best Language:  0 - no aphasia, normal   10. Dysarthria: 0 - normal   11. Extinction and Inattention: 0 - no abnormality             Total:   0      MRS: 0        LABS:   Reviewed. RADIOLOGY:   Images were personally reviewed including:  DSA 2020  --tortuous and dysplastic left communicating ICA, blister aneurysm present x2    Images were personally reviewed including:  CTA head 12/15/2020  Left internal carotid artery supraclinoid tortuosity, which simulated   appearance of aneurysm on noncontrast evaluation.      CT head 12/15/2020  1. Abnormally increased attenuation within the prepontine cistern, suspicious   for hemorrhage. 2. Partially calcified 9.3 cm aneurysm involving the left carotid terminus. ASSESSMENT:   18yo female with pmh of obesity, depression, asthma. Pt presents with 1x week of new headaches, imaging shows possible prepontine sah and L intracranial calcified ICA aneurysm vs. tortuous ICA. DSA 2020 showed presence of x2 dissecting aneurysms in a dysplastic L communicating ICA. Plan for FD stent placement. Risk and benefit of the procedure discussed, pt consented to the procedure. PLAN:   --SBP goal   --continue asa 81 and plavix 75  --plan for surpass evolve FD placement today 2020  --Upon discharge follow up with Dr. Pink in 2 weeks after discharge and Dr. Lolis Schwartz in 3 months after discharge. Case discussed with Dr. Lolis Schwartz attending.     Nadia Douglas MD, PhD    Stroke, [facility]  [facility]

## 2020-12-18 NOTE — PLAN OF CARE
Patient remained free from injury. Patient verbalized understanding of need for the safety precautions. Demonstrates proper use of assistive devices. Bed remains in the lowest position. Call light remains within reach. Falling Star Program in use. Neuro assessment completed, fall precautions in place, aspirations precautions in place, assess for barriers in communication and mobility, interventions to assist in communication and mobility in place, encouraged to call for assistance, adaptive devices used as needed, assess emotional state and support offered, encouraged patient to communicate by available means, and support systems included in patient care.     Problem: Pain:  Goal: Pain level will decrease  Description: Pain level will decrease  Outcome: Met This Shift  Goal: Control of acute pain  Description: Control of acute pain  12/18/2020 1645 by Eduardo Diaz RN  Outcome: Met This Shift  12/18/2020 0555 by Hansa Rock RN  Outcome: Met This Shift  Goal: Control of chronic pain  Description: Control of chronic pain  Outcome: Met This Shift     Problem: Discharge Planning:  Goal: Discharged to appropriate level of care  Description: Discharged to appropriate level of care  Outcome: Met This Shift     Problem: Autonomic Dysreflexia - Risk of:  Goal: Absence of autonomic dysreflexia signs and symptoms  Description: Absence of autonomic dysreflexia signs and symptoms  Outcome: Met This Shift     Problem: Injury - Risk of, Healthcare-Acquired Condition:  Goal: Absence of healthcare acquired conditions  Description: Absence of healthcare acquired conditions  Outcome: Met This Shift  Goal: Will remain free from falls  Description: Will remain free from falls  Outcome: Met This Shift  Goal: Absence of pressure ulcer  Description: Absence of pressure ulcer  Outcome: Met This Shift  Goal: Demonstration of wound healing without infection will improve Description: Demonstration of wound healing without infection will improve  Outcome: Met This Shift  Goal: Absence of urinary tract infection signs and symptoms  Description: Absence of urinary tract infection signs and symptoms  Outcome: Met This Shift     Problem: Psychosocial Distress:  Goal: Absence of psychosocial distress  Description: Absence of psychosocial distress  Outcome: Met This Shift  Goal: Ability to cope will improve  Description: Ability to cope will improve  Outcome: Met This Shift  Goal: Ability to identify and utilize available support systems will improve  Description: Ability to identify and utilize available support systems will improve  Outcome: Met This Shift     Problem: Bleeding:  Goal: Will show no signs and symptoms of excessive bleeding  Description: Will show no signs and symptoms of excessive bleeding  12/18/2020 1645 by Song Arevalo RN  Outcome: Met This Shift  12/18/2020 0555 by Oanh Eastman RN  Outcome: Met This Shift

## 2020-12-18 NOTE — PROGRESS NOTES
Daily Progress Note  Neuro Critical Care    Patient Name: Vitaliy Gr  Patient : 2000  Room/Bed: 8815/3663-37  Code Status: Full Code  Allergies: Allergies   Allergen Reactions    Latex        CHIEF COMPLAINT:     Headache for 1 week. 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 Plan for possible intervention tomorrow. 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 Femoral sheath left in place. Plan for possible intervention tomorrow. 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 Patient was given 600 mg of aspirin 150 mg of Plavix along with a low-dose heparin drip. Femoral sheath was removed. Last 24h:   Patient notes that she is feeling better after being able to sit up in bed. Patient denies any numbness/tingling, or weakness. Patient was eating a p.o. diet without any difficulties and passing flatus but is yet to have a bowel movement. Patient denies any fever, chills, chest pain, abdominal pain, nausea/vomiting, shortness of breath, or dysuria. Patient would like to go home today.     CURRENT MEDICATIONS:  SCHEDULED MEDICATIONS:   aspirin  325 mg Oral Daily    clopidogrel  75 mg Oral Daily    gabapentin  300 mg Oral Nightly    sodium chloride flush  10 mL Intravenous 2 times per day    ondansetron  4 mg Intravenous Once    lidocaine  1 patch Transdermal Daily    sodium chloride flush  10 mL Intravenous 2 times per day    buPROPion  75 mg Oral Nightly    fluticasone  2 puff Inhalation BID    levonorgestrel-ethinyl estradiol  1 tablet Oral Daily  montelukast  10 mg Oral Nightly    sertraline  25 mg Oral Nightly    insulin lispro  0-6 Units Subcutaneous TID WC    insulin lispro  0-3 Units Subcutaneous Nightly     CONTINUOUS INFUSIONS:   lactated ringers 125 mL/hr at 20 1451    heparin (PORCINE) Infusion Stopped (20 1025)    sodium chloride 75 mL/hr at 20 0453    niCARdipine Stopped (20 2301)    dextrose       PRN MEDICATIONS:   sodium chloride flush, promethazine **OR** ondansetron, sodium chloride flush, acetaminophen **OR** acetaminophen, senna, glucose, dextrose, glucagon (rDNA), dextrose    VITALS:  Temperature Range: Temp: 98.9 °F (37.2 °C) Temp  Av.8 °F (37.1 °C)  Min: 96.8 °F (36 °C)  Max: 99.5 °F (37.5 °C)  BP Range: Systolic (83UCN), CPV:011 , Min:86 , OLS:488     Diastolic (26JLA), ZEI:33, Min:44, Max:77    Pulse Range: Pulse  Av.5  Min: 83  Max: 108  Respiration Range: Resp  Av.7  Min: 0  Max: 31  Current Pulse Ox: SpO2: 97 %  24HR Pulse Ox Range: SpO2  Av.1 %  Min: 90 %  Max: 100 %  Patient Vitals for the past 12 hrs:   BP Temp Temp src Pulse Resp SpO2   20 1402 112/64   90 17 97 %   20 1302 120/74   100 20 98 %   20 1202 124/61 98.9 °F (37.2 °C) Oral 99 21 98 %   20 1102 114/71   98 22 98 %   20 1017 113/70   104 20 98 %   20 0902 120/72   98  97 %   20 0802 122/72 98.3 °F (36.8 °C) Oral 101 18 96 %   20 0700 113/73   92  97 %   20 0600 112/68   91  97 %   20 0500 118/62   94  96 %   20 0400 119/61 98.7 °F (37.1 °C) Oral 92 15 97 %   20 0300 125/68   95  97 %     Estimated body mass index is 36.21 kg/m² as calculated from the following:    Height as of this encounter: 5' 2\" (1.575 m).     Weight as of this encounter: 197 lb 15.6 oz (89.8 kg).  []<16 Severe malnutrition  []1616.99 Moderate malnutrition  []1718.49 Mild malnutrition  []18.524.9 Normal  []2529.9 Overweight (not obese) []3034.9 Obese class 1 (Low Risk)  [x]3539.9 Obese class 2 (Moderate Risk)  []?40 Obese class 3 (High Risk)    RECENT LABS:   Lab Results   Component Value Date    WBC 8.4 12/18/2020    HGB 10.4 (L) 12/18/2020    HCT 32.9 (L) 12/18/2020     12/18/2020    ALT 11 12/20/2015    AST 17 12/20/2015     12/18/2020    K 3.7 12/18/2020     12/18/2020    CREATININE 0.46 (L) 12/18/2020    BUN 6 12/18/2020    CO2 19 (L) 12/18/2020    TSH 1.50 11/06/2020    INR 1.1 12/15/2020    LABA1C 5.0 12/16/2020     24 HOUR INTAKE/OUTPUT:    Intake/Output Summary (Last 24 hours) at 12/18/2020 1448  Last data filed at 12/18/2020 0500  Gross per 24 hour   Intake 1217 ml   Output 1657 ml   Net -440 ml       IMAGING:   Echo Complete 2d W Doppler W Color    Result Date: 12/17/2020 Transthoracic Echocardiography Report (TTE)  Patient Name DEANA     Date of Study             12/17/2020               Abel Jaime   Date of      2000  Gender                    Female  Birth   Age          23 year(s)  Race                         Room Number  2229        Height:                   62 inch, 157.48 cm   Corporate ID [id number]    Weight:                   197 pounds, 89.4 kg  #   Patient Acct [de-identified]   BSA:         1.9 m^2      BMI:       36.03 kg/m^2  #   MR #         [id number]     Sonographer               Jairon Mohan   Accession #  [id number]  Interpreting Physician    Nahed Hurtado   Fellow                   Referring Nurse                           Practitioner   Interpreting             Referring Physician       Noy Franklin  Fellow                                             APRN-CNP  Type of Study   TTE procedure:2D Echocardiogram, M-Mode, Doppler, Color Doppler. Procedure Date Date: 12/17/2020 Start: 08:05 AM Study Location: [facility] Technical Quality: Adequate visualization Indications:Cardiac Evaluation. History / Tech. Comments: Procedure explained to patient. Smoker Patient Status: Inpatient Height: 62 inches Weight: 197 pounds BSA: 1.9 m^2 BMI: 36.03 kg/m^2 CONCLUSIONS Summary Left ventricle is normal in size, normal wall thickness, global left ventricular systolic function is normal. Calculated ejection fraction is 61%. Left atrium is normal in size. Inter-atrial septum is intact with no evidence for an atrial septal defect, by color doppler. Trivial mitral regurgitation. Tricuspid valve was not well visualized. Trivial tricuspid regurgitation. Estimated right ventricular systolic pressure is 32 mmHg.  Signature ----------------------------------------------------------------------------  Electronically signed by Shavon ThorpeSonographer) on 12/17/2020 11:44 AM ---------------------------------------------------------------------------- ----------------------------------------------------------------------------  Electronically signed by Franklin OwensInterpreting physician) on 12/17/2020  12:05 PM ---------------------------------------------------------------------------- FINDINGS Left Atrium Left atrium is normal in size. Inter-atrial septum is intact with no evidence for an atrial septal defect, by color doppler. Left Ventricle Left ventricle is normal in size, normal wall thickness, global left ventricular systolic function is normal, calculated ejection fraction is 61%. Right Atrium Right atrium is normal in size. Right Ventricle Normal right ventricular size and function. Mitral Valve Mitral valve sclerosis without stenosis. Trivial mitral regurgitation. Aortic Valve Aortic valve is trileaflet. No evidence of aortic insufficiency or stenosis. Tricuspid Valve Tricuspid valve was not well visualized. Trivial tricuspid regurgitation. Estimated right ventricular systolic pressure is 32 mmHg. Pulmonic Valve Pulmonic valve not well visualized but Doppler velocities are normal. Trivial pulmonic insufficiency. Pericardial Effusion No significant pericardial effusion is seen. Miscellaneous Normal aortic root dimension. E/E' average = 4.85. IVC normal diameter & inspiratory collapse indicating normal RA filling pressure .  M-mode / 2D Measurements & Calculations:   LVIDd:5.3 cm(3.7 - 5.6 cm)        Diastolic NPLUWV:78.5 ml  PCUZ:9.2 cm(0.6 - 1.1 cm)         Systolic FXABYA:95.7 ml  MFYVS:4.1 cm(0.6 - 1.1 cm)        Aortic Root:2.5 cm(2.0 - 3.7 cm)                                    LA Dimension: 3.3 cm(1.9 - 4.0 cm)  Calculated LVEF (%): 61.56 %      LA volume/Index: 47.3 ml /25m^2                                    LVOT:2.1 cm                                    RVDd:2.6 cm   Mitral:                                 Aortic   Valve Area (P1/2-Time): 4.58 cm^2       Peak Velocity: 1.48 m/s  Peak E-Wave: 0.78 m/s                   Mean Velocity: 1.05 m/s Peak A-Wave: 0.48 m/s                   Peak Gradient: 8.76 mmHg  E/A Ratio: 1.63                         Mean Gradient: 5 mmHg  Peak Gradient: 2.41 mmHg  Mean Gradient: 2 mmHg  Deceleration Time: 180 msec             Area (continuity): 3.02 cm^2  P1/2t: 48 msec                          AV VTI: 27.6 cm   Area (continuity): 3.69 cm^2  Mean Velocity: 0.71 m/s   Tricuspid:                              Pulmonic:   Estimated RVSP: 32 mmHg                 Peak Velocity: 1.25 m/s  Peak TR Velocity: 2.62 m/s              Peak Gradient: 6.25 mmHg  Peak TR Gradient: 27.4576 mmHg  Diastology / Tissue Doppler Septal Wall E' velocity:0.16 m/s Septal Wall E/E':4.8 Lateral Wall E' velocity:0.16 m/s Lateral Wall E/E':4.9    Ct Head Wo Contrast    Result Date: 12/15/2020 EXAMINATION: CT OF THE HEAD WITHOUT CONTRAST  12/15/2020 8:29 pm TECHNIQUE: CT of the head was performed without the administration of intravenous contrast. Dose modulation, iterative reconstruction, and/or weight based adjustment of the mA/kV was utilized to reduce the radiation dose to as low as reasonably achievable. COMPARISON: None. HISTORY: ORDERING SYSTEM PROVIDED HISTORY: headache TECHNOLOGIST PROVIDED HISTORY: headache Is the patient pregnant?->No Reason for Exam: Migraine for 1 week Acuity: Acute Type of Exam: Initial FINDINGS: BRAIN/VENTRICLES: Abnormal increased attenuation is seen within the prepontine cistern, suspicious or prepontine hemorrhage. No hemorrhage is identified within the basilar cisterns, or interpeduncular cistern. No subarachnoid hemorrhage is seen over the cerebral convexities. .  The gray-white differentiation is maintained without evidence of an acute infarct. There is no evidence of hydrocephalus. No mass effect is present. A partially calcified 9 mm aneurysm is present involving the left carotid terminus. ORBITS: The visualized portion of the orbits demonstrate no acute abnormality. SINUSES: The visualized paranasal sinuses and mastoid air cells demonstrate no acute abnormality. SOFT TISSUES/SKULL:  No acute abnormality of the visualized skull or soft tissues. 1. Abnormally increased attenuation within the prepontine cistern, suspicious for hemorrhage. 2. Partially calcified 9.3 cm aneurysm involving the left carotid terminus.  3.  Critical results were called by Dr. Rosalia Da Silva to TRAVIS GERBRE [facility] on 12/15/2020 at 21:14. RECOMMENDATIONS: Dedicated CTA of the intracranial circulation recommended for further evaluation     Cta Head W Contrast    Result Date: 12/15/2020 EXAMINATION: CTA OF THE HEAD WITH CONTRAST 12/15/2020 10:40 pm: TECHNIQUE: CTA of the head/brain was performed with the administration of intravenous contrast. Multiplanar reformatted images are provided for review. MIP images are provided for review. Dose modulation, iterative reconstruction, and/or weight based adjustment of the mA/kV was utilized to reduce the radiation dose to as low as reasonably achievable. COMPARISON: CT head without contrast December 15, 2020 at 2138 hours HISTORY: ORDERING SYSTEM PROVIDED HISTORY: headache, abnormal Ct TECHNOLOGIST PROVIDED HISTORY: headache, abnormal Ct Reason for Exam: Headache for 1 week, abnormal CT, no prior surgeries to head Acuity: Acute Type of Exam: Initial FINDINGS: ANTERIOR CIRCULATION: No significant stenosis of the intracranial internal carotid, anterior cerebral, or middle cerebral arteries. No aneurysm. Tortuosity of the supraclinoid left ICA is present. POSTERIOR CIRCULATION: No significant stenosis of the vertebral, basilar, or posterior cerebral arteries. No aneurysm. OTHER: No dural venous sinus thrombosis on this non-dedicated study. BRAIN: No mass effect or midline shift. No extra-axial fluid collection. The gray-white differentiation is maintained. Left internal carotid artery supraclinoid tortuosity, which simulated appearance of aneurysm on noncontrast evaluation. Otherwise, unremarkable CT angiogram of the head. Labs and Images reviewed with:  [] Dr. Nazanin Babin. Anya    [x] Dr. Mendoza People  [] Dr. Donna Rodriguez  [] There are no new interval images to review. PHYSICAL EXAM       CONSTITUTIONAL:  Well developed, well nourished, alert and oriented x 3, in no acute distress. GCS 15. Nontoxic. No dysarthria. No aphasia.    HEAD:  normocephalic, atraumatic    EYES:  PERRLA, EOMI.   ENT:  moist mucous membranes   NECK:  supple, symmetric   LUNGS:  Equal air entry bilaterally CARDIOVASCULAR:  normal s1 / s2, RRR, 2+ radial/dorsal tibialis/posterior tibial pulses bilaterally. ABDOMEN:  Soft, no rigidity   NEUROLOGIC:  Mental Status:  A & O x3,awake             Cranial Nerves:    cranial nerves II-XII are grossly intact    Motor Exam:    Drift:  absent  Tone:  normal    Motor exam is symmetrical 5 out of 5 all extremities bilaterally    Sensory:    Touch:    Right Upper Extremity:  normal  Left Upper Extremity:  normal  Right Lower Extremity:  normal  Left Lower Extremity:  normal    Deep Tendon Reflexes:    Right Bicep:  2+  Left Bicep:  2+  Right Knee:  2+  Left Knee:  2+    Plantar Response:  Right:  downgoing  Left:  downgoing    Clonus:  N/A  Crowley's:  N/A    Coordination/Dysmetria:  Heel to Shin:  Right:  normal  Finger to Nose:   Right:  normal   Dysdiadochokinesia:  N/A    Gait:  Could not evaluate due to femoral sheath in place     DRAINS:  [x] There are no drains for Neuro Critical Care to monitor at this time. ASSESSMENT AND PLAN:       79-year-old female presents with a history of 1 week of right-sided headaches with gradual onset. There is associated photophobia and mild dizziness with this headache and no associated fevers, chills, diaphoresis, numbness/tingling, weakness, neck pain or back pain. CT head and CTA head and neck revealed concern for aneurysm and patient was transferred for neuro endovascular team to evaluate. Care plan has been discussed with attending and patient.     NEUROLOGIC:  - Possible endo-vascular intervention today  - AEDs    -Keppra 500 mg stopped today  - ASA and Plavix s/p stent   -Low dose Heparin gtt to be stopped after receiving DAP today   -325 mg ASA Daily and 75 mg plavix Daily  - Goal SBP < 140 mmHg  - Nimodipine 60 mg Q4H  - Neuro checks per protocol    CARDIOVASCULAR:  - Goal SBP < 140 mmHg  - Continue telemetry    PULMONARY:  - encourage incentive inspirometry    RENAL/FLUID/ELECTROLYTE:  - BUN 6/ Creatinine 0.46 - Urine output 1.1 ml/kg/hr  - Stopped IVF  - Replace electrolytes PRN  - Daily BMP    GI/NUTRITION:  NUTRITION:  DIET GENERAL;  - Bowel regimen: senna;  - GI prophylaxis: none needed    ID:  - Tmax 37.2  - WBC 8.4  - Daily CBC    HEME:   - H&H 10.4  - Platelets 876  - Daily CBC    ENDOCRINE:  - Continue to monitor blood glucose, goal <180  - SSI    OTHER:  - Chronic Back pain   - Managed out pt    - Start 300 mg Gabapentin QHS    -with one time dose now  - PT/OT/ST to be done after neuro endovascular intervention  - Code Status: Full Code    PROPHYLAXIS:  Stress ulcer: none    DVT PROPHYLAXIS:  - SCD sleeves - Thigh High   - ASA  - Plavix  - Low dose Heparin gtt    DISPOSITION:  [x] To remain ICU    We will continue to follow along. For any changes in exam or patient status please contact Neuro Critical Care.       Dianne Cruz,   Neuro Critical Care  EM PGY-2  12/18/2020     2:48 PM

## 2020-12-18 NOTE — PROGRESS NOTES
10cc air removed from safeguard at this time. No hematoma or oozing noted from groin site. Pulses palpable. All air removed. Will continue to monitor.

## 2020-12-18 NOTE — PLAN OF CARE
Problem: Pain:  Goal: Control of acute pain  Description: Control of acute pain  Outcome: Met This Shift     Problem: Bleeding:  Goal: Will show no signs and symptoms of excessive bleeding  Description: Will show no signs and symptoms of excessive bleeding  Outcome: Met This Shift

## 2020-12-18 NOTE — PROGRESS NOTES
10cc air removed from safeguard at this time. No hematoma or oozing noted from groin site. Pulses palpable. Will continue to monitor.

## 2020-12-18 NOTE — BRIEF OP NOTE
--Upon discharge follow up with Dr. Ching Diane in 2 weeks after discharge and Dr. Dixie Suero in 3 months after discharge.     Florentin Beal MD PhD fellow    Shashi Higginbotham MD   Pager: [phone]  Stroke, [facility]  [address] [facility] [place]  Electronically signed 12/17/2020 at 7:31 PM

## 2020-12-19 VITALS
WEIGHT: 196.3 LBS | RESPIRATION RATE: 16 BRPM | DIASTOLIC BLOOD PRESSURE: 80 MMHG | TEMPERATURE: 98.8 F | SYSTOLIC BLOOD PRESSURE: 122 MMHG | HEIGHT: 62 IN | BODY MASS INDEX: 36.12 KG/M2 | OXYGEN SATURATION: 100 % | HEART RATE: 84 BPM

## 2020-12-19 LAB
ANION GAP SERPL CALCULATED.3IONS-SCNC: 11 MMOL/L (ref 9–17)
BUN BLDV-MCNC: 10 MG/DL (ref 6–20)
BUN/CREAT BLD: ABNORMAL (ref 9–20)
CALCIUM SERPL-MCNC: 7.8 MG/DL (ref 8.6–10.4)
CHLORIDE BLD-SCNC: 109 MMOL/L (ref 98–107)
CO2: 21 MMOL/L (ref 20–31)
CREAT SERPL-MCNC: 0.69 MG/DL (ref 0.5–0.9)
FERRITIN: 41 UG/L (ref 13–150)
GFR AFRICAN AMERICAN: >60 ML/MIN
GFR NON-AFRICAN AMERICAN: >60 ML/MIN
GFR SERPL CREATININE-BSD FRML MDRD: ABNORMAL ML/MIN/{1.73_M2}
GFR SERPL CREATININE-BSD FRML MDRD: ABNORMAL ML/MIN/{1.73_M2}
GLUCOSE BLD-MCNC: 117 MG/DL (ref 70–99)
GLUCOSE BLD-MCNC: 85 MG/DL (ref 65–105)
GLUCOSE BLD-MCNC: 96 MG/DL (ref 65–105)
HCT VFR BLD CALC: 31.9 % (ref 36.3–47.1)
HEMOGLOBIN: 9.9 G/DL (ref 11.9–15.1)
HPV SAMPLE: NORMAL
HPV, GENOTYPE 16: NOT DETECTED
HPV, GENOTYPE 18: NOT DETECTED
HPV, HIGH RISK OTHER: NOT DETECTED
HPV, INTERPRETATION: NORMAL
IRON SATURATION: 8 % (ref 20–55)
IRON: 25 UG/DL (ref 37–145)
MCH RBC QN AUTO: 27.7 PG (ref 25.2–33.5)
MCHC RBC AUTO-ENTMCNC: 31 G/DL (ref 28.4–34.8)
MCV RBC AUTO: 89.1 FL (ref 82.6–102.9)
NRBC AUTOMATED: 0 PER 100 WBC
PDW BLD-RTO: 13.3 % (ref 11.8–14.4)
PLATELET # BLD: 261 K/UL (ref 138–453)
PMV BLD AUTO: 11.7 FL (ref 8.1–13.5)
POTASSIUM SERPL-SCNC: 3.7 MMOL/L (ref 3.7–5.3)
RBC # BLD: 3.58 M/UL (ref 3.95–5.11)
SODIUM BLD-SCNC: 141 MMOL/L (ref 135–144)
SPECIMEN DESCRIPTION: NORMAL
TOTAL IRON BINDING CAPACITY: 307 UG/DL (ref 250–450)
UNSATURATED IRON BINDING CAPACITY: 282 UG/DL (ref 112–347)
WBC # BLD: 9.4 K/UL (ref 4.5–13.5)

## 2020-12-19 PROCEDURE — 83540 ASSAY OF IRON: CPT

## 2020-12-19 PROCEDURE — 99233 SBSQ HOSP IP/OBS HIGH 50: CPT | Performed by: PSYCHIATRY & NEUROLOGY

## 2020-12-19 PROCEDURE — 6360000002 HC RX W HCPCS: Performed by: STUDENT IN AN ORGANIZED HEALTH CARE EDUCATION/TRAINING PROGRAM

## 2020-12-19 PROCEDURE — 82728 ASSAY OF FERRITIN: CPT

## 2020-12-19 PROCEDURE — 85027 COMPLETE CBC AUTOMATED: CPT

## 2020-12-19 PROCEDURE — 94640 AIRWAY INHALATION TREATMENT: CPT

## 2020-12-19 PROCEDURE — 82947 ASSAY GLUCOSE BLOOD QUANT: CPT

## 2020-12-19 PROCEDURE — 99232 SBSQ HOSP IP/OBS MODERATE 35: CPT | Performed by: PSYCHIATRY & NEUROLOGY

## 2020-12-19 PROCEDURE — 80048 BASIC METABOLIC PNL TOTAL CA: CPT

## 2020-12-19 PROCEDURE — 6370000000 HC RX 637 (ALT 250 FOR IP): Performed by: STUDENT IN AN ORGANIZED HEALTH CARE EDUCATION/TRAINING PROGRAM

## 2020-12-19 PROCEDURE — 36415 COLL VENOUS BLD VENIPUNCTURE: CPT

## 2020-12-19 PROCEDURE — 6370000000 HC RX 637 (ALT 250 FOR IP): Performed by: NURSE PRACTITIONER

## 2020-12-19 PROCEDURE — 83550 IRON BINDING TEST: CPT

## 2020-12-19 RX ORDER — CLOPIDOGREL BISULFATE 75 MG/1
75 TABLET ORAL DAILY
Qty: 30 TABLET | Refills: 0 | Status: SHIPPED | OUTPATIENT
Start: 2020-12-20 | End: 2021-01-22 | Stop reason: SDUPTHER

## 2020-12-19 RX ORDER — HEPARIN SODIUM 5000 [USP'U]/ML
5000 INJECTION, SOLUTION INTRAVENOUS; SUBCUTANEOUS EVERY 8 HOURS SCHEDULED
Status: DISCONTINUED | OUTPATIENT
Start: 2020-12-19 | End: 2020-12-19 | Stop reason: HOSPADM

## 2020-12-19 RX ORDER — BUTALBITAL, ACETAMINOPHEN AND CAFFEINE 50; 325; 40 MG/1; MG/1; MG/1
1 TABLET ORAL EVERY 6 HOURS PRN
Qty: 20 TABLET | Refills: 0 | Status: SHIPPED | OUTPATIENT
Start: 2020-12-19 | End: 2021-06-17 | Stop reason: ALTCHOICE

## 2020-12-19 RX ADMIN — CLOPIDOGREL 75 MG: 75 TABLET, FILM COATED ORAL at 08:39

## 2020-12-19 RX ADMIN — ASPIRIN 325 MG: 325 TABLET, COATED ORAL at 08:39

## 2020-12-19 RX ADMIN — FLUTICASONE PROPIONATE 2 PUFF: 110 AEROSOL, METERED RESPIRATORY (INHALATION) at 09:17

## 2020-12-19 RX ADMIN — HEPARIN SODIUM 5000 UNITS: 5000 INJECTION INTRAVENOUS; SUBCUTANEOUS at 09:12

## 2020-12-19 ASSESSMENT — PAIN SCALES - GENERAL
PAINLEVEL_OUTOF10: 0

## 2020-12-19 NOTE — DISCHARGE SUMMARY
Patient is doing well this morning. Patient notes that she is getting up walking with any difficulty. Patient is denying any kind of numbness/tingling, weakness. Patient is eating a full diet without any nausea vomiting and having bowel movements. Patient denies any fevers or chills, shortness breath, chest pain, abdominal pain, cough, or dysuria. Patient would like to go home today. Patient was agreeable with discharge plan. Patient was educated on the importance of following up with Dr. Diane Cardona in 2 weeks and Dr. Yared Hallman in 3 months. Patient was also told the importance of not lifting anything heavier than 10 pounds above her shoulders. Patient agreeable and verbalized understanding on her own words. Labs and imaging were followed daily. At time of discharge, Kamran Dickreson was tolerating a DIET GENERAL;, having bowel movements, and is in good condition to be discharged to home. PROCEDURES:    aneurysm embolization with flow diverter stent - done by neuro-endovascular surgery. PHYSICAL EXAMINATION        Discharge Vitals:  height is 5' 2\" (1.575 m) and weight is 196 lb 4.8 oz (89 kg). Her oral temperature is 98.8 °F (37.1 °C). Her blood pressure is 118/84 and her pulse is 84. Her respiration is 16 and oxygen saturation is 99%. CONSTITUTIONAL:  Well developed, well nourished, alert and oriented x 3, in no acute distress. GCS 15. Nontoxic. No dysarthria. No aphasia. HEAD:  normocephalic, atraumatic    EYES:  PERRLA, EOMI.   ENT:  moist mucous membranes   NECK:  supple, symmetric   LUNGS:  Equal air entry bilaterally   CARDIOVASCULAR:  normal s1 / s2, RRR, 2+ radial/dorsal tibialis/posterior tibial pulses bilaterally.    ABDOMEN:  Soft, no rigidity   Skin  right groin has no ecchymosis C/D/I   NEUROLOGIC:  Mental Status:  A & O x3,awake             Cranial Nerves:    cranial nerves II-XII are grossly intact     Motor Exam:    Drift:  absent  Tone:  normal    Motor exam is symmetrical 5 out of 5 all extremities bilaterally     Sensory:    Touch:    Right Upper Extremity:  normal  Left Upper Extremity:  normal  Right Lower Extremity:  normal  Left Lower Extremity:  normal        Plantar Response:  Right:  downgoing  Left:  downgoing           Gait: Normal          LABS/IMAGING     Recent Labs     12/17/20  0707 12/18/20  0526 12/19/20  0418   WBC 8.8 8.4 9.4   HGB 11.1* 10.4* 9.9*   HCT 34.8* 32.9* 31.9*    251 261    139 141   K 3.4* 3.7 3.7   * 107 109*   CO2 19* 19* 21   BUN 7 6 10   CREATININE 0.50 0.46* 0.69       Echo Complete 2d W Doppler W Color    Result Date: 12/17/2020 ----------------------------------------------------------------------------  Electronically signed by Franklin OwensInterpreting physician) on 12/17/2020  12:05 PM ---------------------------------------------------------------------------- FINDINGS Left Atrium Left atrium is normal in size. Inter-atrial septum is intact with no evidence for an atrial septal defect, by color doppler. Left Ventricle Left ventricle is normal in size, normal wall thickness, global left ventricular systolic function is normal, calculated ejection fraction is 61%. Right Atrium Right atrium is normal in size. Right Ventricle Normal right ventricular size and function. Mitral Valve Mitral valve sclerosis without stenosis. Trivial mitral regurgitation. Aortic Valve Aortic valve is trileaflet. No evidence of aortic insufficiency or stenosis. Tricuspid Valve Tricuspid valve was not well visualized. Trivial tricuspid regurgitation. Estimated right ventricular systolic pressure is 32 mmHg. Pulmonic Valve Pulmonic valve not well visualized but Doppler velocities are normal. Trivial pulmonic insufficiency. Pericardial Effusion No significant pericardial effusion is seen. Miscellaneous Normal aortic root dimension. E/E' average = 4.85. IVC normal diameter & inspiratory collapse indicating normal RA filling pressure .  M-mode / 2D Measurements & Calculations:   LVIDd:5.3 cm(3.7 - 5.6 cm)        Diastolic NDRNQA:86.2 ml  JDJE:1.5 cm(0.6 - 1.1 cm)         Systolic HISKEW:01.5 ml  WCDMH:6.7 cm(0.6 - 1.1 cm)        Aortic Root:2.5 cm(2.0 - 3.7 cm)                                    LA Dimension: 3.3 cm(1.9 - 4.0 cm)  Calculated LVEF (%): 61.56 %      LA volume/Index: 47.3 ml /25m^2                                    LVOT:2.1 cm                                    RVDd:2.6 cm   Mitral:                                 Aortic   Valve Area (P1/2-Time): 4.58 cm^2       Peak Velocity: 1.48 m/s  Peak E-Wave: 0.78 m/s                   Mean Velocity: 1.05 m/s EXAMINATION: CT OF THE HEAD WITHOUT CONTRAST  12/15/2020 8:29 pm TECHNIQUE: CT of the head was performed without the administration of intravenous contrast. Dose modulation, iterative reconstruction, and/or weight based adjustment of the mA/kV was utilized to reduce the radiation dose to as low as reasonably achievable. COMPARISON: None. HISTORY: ORDERING SYSTEM PROVIDED HISTORY: headache TECHNOLOGIST PROVIDED HISTORY: headache Is the patient pregnant?->No Reason for Exam: Migraine for 1 week Acuity: Acute Type of Exam: Initial FINDINGS: BRAIN/VENTRICLES: Abnormal increased attenuation is seen within the prepontine cistern, suspicious or prepontine hemorrhage. No hemorrhage is identified within the basilar cisterns, or interpeduncular cistern. No subarachnoid hemorrhage is seen over the cerebral convexities. .  The gray-white differentiation is maintained without evidence of an acute infarct. There is no evidence of hydrocephalus. No mass effect is present. A partially calcified 9 mm aneurysm is present involving the left carotid terminus. ORBITS: The visualized portion of the orbits demonstrate no acute abnormality. SINUSES: The visualized paranasal sinuses and mastoid air cells demonstrate no acute abnormality. SOFT TISSUES/SKULL:  No acute abnormality of the visualized skull or soft tissues. 1. Abnormally increased attenuation within the prepontine cistern, suspicious for hemorrhage. 2. Partially calcified 9.3 cm aneurysm involving the left carotid terminus.  3.  Critical results were called by Dr. Shekhar Moulton to [facility] on 12/15/2020 at 21:14. RECOMMENDATIONS: Dedicated CTA of the intracranial circulation recommended for further evaluation     Cta Head W Contrast    Result Date: 12/15/2020 EXAMINATION: CTA OF THE HEAD WITH CONTRAST 12/15/2020 10:40 pm: TECHNIQUE: CTA of the head/brain was performed with the administration of intravenous contrast. Multiplanar reformatted images are provided for review. MIP images are provided for review. Dose modulation, iterative reconstruction, and/or weight based adjustment of the mA/kV was utilized to reduce the radiation dose to as low as reasonably achievable. COMPARISON: CT head without contrast December 15, 2020 at 2138 hours HISTORY: ORDERING SYSTEM PROVIDED HISTORY: headache, abnormal Ct TECHNOLOGIST PROVIDED HISTORY: headache, abnormal Ct Reason for Exam: Headache for 1 week, abnormal CT, no prior surgeries to head Acuity: Acute Type of Exam: Initial FINDINGS: ANTERIOR CIRCULATION: No significant stenosis of the intracranial internal carotid, anterior cerebral, or middle cerebral arteries. No aneurysm. Tortuosity of the supraclinoid left ICA is present. POSTERIOR CIRCULATION: No significant stenosis of the vertebral, basilar, or posterior cerebral arteries. No aneurysm. OTHER: No dural venous sinus thrombosis on this non-dedicated study. BRAIN: No mass effect or midline shift. No extra-axial fluid collection. The gray-white differentiation is maintained. Left internal carotid artery supraclinoid tortuosity, which simulated appearance of aneurysm on noncontrast evaluation. Otherwise, unremarkable CT angiogram of the head.          DISCHARGE INSTRUCTIONS     Discharge Medications:        Medication List      START taking these medications    aspirin 325 MG EC tablet  Take 1 tablet by mouth daily  Start taking on: December 20, 2020     butalbital-acetaminophen-caffeine -40 MG per tablet  Commonly known as: FIORICET, ESGIC  Take 1 tablet by mouth every 6 hours as needed for Headaches     clopidogrel 75 MG tablet  Commonly known as: PLAVIX  Take 1 tablet by mouth daily  Start taking on: December 20, 2020        CONTINUE taking these medications buPROPion 150 MG extended release tablet  Commonly known as: WELLBUTRIN XL     fluticasone 110 MCG/ACT inhaler  Commonly known as: Flovent HFA  Inhale 2 puffs into the lungs 2 times daily     levonorgestrel-ethinyl estradiol 0.15-0.03 MG per tablet  Commonly known as: SEASONALE  Take 1 tablet by mouth daily     loratadine 10 MG tablet  Commonly known as: CLARITIN     metFORMIN 500 MG extended release tablet  Commonly known as: GLUCOPHAGE-XR     montelukast 10 MG tablet  Commonly known as: SINGULAIR     sertraline 25 MG tablet  Commonly known as: ZOLOFT        STOP taking these medications    albuterol sulfate  (90 Base) MCG/ACT inhaler  Commonly known as: ProAir HFA     Aleve 220 MG Caps  Generic drug: Naproxen Sodium     cyclobenzaprine 10 MG tablet  Commonly known as: FLEXERIL           Where to Get Your Medications      You can get these medications from any pharmacy    Bring a paper prescription for each of these medications  · aspirin 325 MG EC tablet  · butalbital-acetaminophen-caffeine -40 MG per tablet  · clopidogrel 75 MG tablet       Diet: DIET GENERAL; diet as tolerated  Activity: limited light weight activity  Follow-up:  Upon discharge follow up with Dr. Pink in 2 weeks after discharge and Dr. Yolanda Marina 3 months after discharge.   Time Spent for discharge: 30 minutes    Cassidy Goss DO  Neuro Critical Care  12/19/2020, 1:31 PM
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
## 2020-12-19 NOTE — PROGRESS NOTES
Daily Progress Note  Neuro Critical Care    Patient Name: Mike Garcias  Patient : 2000  Room/Bed: 0335/7169-24  Code Status: Full Code  Allergies: Allergies   Allergen Reactions    Latex        CHIEF COMPLAINT:     Headache for 1 week. 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 Plan for possible intervention tomorrow. 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 Femoral sheath left in place. Plan for possible intervention tomorrow. 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 Patient was given 600 mg of aspirin 150 mg of Plavix along with a low-dose heparin drip. Femoral sheath was removed. : Low-dose heparin drip was stopped after patient received 325 mg of aspirin and 75 mg of Plavix. Patient is up walking around with no focal neuro deficits. Pelvic function assay was indicates that patient is on antiplatelet therapy. Last 24h:   Patient is doing well this morning. Patient notes that she is getting up walking with any difficulty. Patient is denying any kind of numbness/tingling, weakness. Patient is eating a full diet without any nausea vomiting and having bowel movements. Patient denies any fevers or chills, shortness breath, chest pain, abdominal pain, cough, or dysuria. Patient would like to go home today.     CURRENT MEDICATIONS:  SCHEDULED MEDICATIONS:   aspirin  325 mg Oral Daily    clopidogrel  75 mg Oral Daily    gabapentin  300 mg Oral Nightly    sodium chloride flush  10 mL Intravenous 2 times per day    ondansetron  4 mg Intravenous Once  lidocaine  1 patch Transdermal Daily    sodium chloride flush  10 mL Intravenous 2 times per day    buPROPion  75 mg Oral Nightly    fluticasone  2 puff Inhalation BID    levonorgestrel-ethinyl estradiol  1 tablet Oral Daily    montelukast  10 mg Oral Nightly    sertraline  25 mg Oral Nightly    insulin lispro  0-6 Units Subcutaneous TID WC    insulin lispro  0-3 Units Subcutaneous Nightly     CONTINUOUS INFUSIONS:   dextrose       PRN MEDICATIONS:   sodium chloride flush, promethazine **OR** ondansetron, sodium chloride flush, acetaminophen **OR** acetaminophen, senna, glucose, dextrose, glucagon (rDNA), dextrose    VITALS:  Temperature Range: Temp: 98.3 °F (36.8 °C) Temp  Av.2 °F (36.8 °C)  Min: 97.8 °F (36.6 °C)  Max: 98.9 °F (37.2 °C)  BP Range: Systolic (43PUD), DFQ:005 , Min:112 , HED:198     Diastolic (18KGH), AUF:90, Min:61, Max:89    Pulse Range: Pulse  Av  Min: 88  Max: 110  Respiration Range: Resp  Av.2  Min: 16  Max: 23  Current Pulse Ox: SpO2: 99 %  24HR Pulse Ox Range: SpO2  Av.9 %  Min: 96 %  Max: 99 %  Patient Vitals for the past 12 hrs:   BP Temp Temp src Pulse Resp SpO2   20 0400 118/84 98.3 °F (36.8 °C) Oral 88 16 99 %   20 0012 121/89 98.1 °F (36.7 °C) Oral   98 %   20     16 99 %   20 117/65 97.8 °F (36.6 °C) Oral 110 17 99 %     Estimated body mass index is 36.21 kg/m² as calculated from the following:    Height as of this encounter: 5' 2\" (1.575 m).     Weight as of this encounter: 197 lb 15.6 oz (89.8 kg).  []<16 Severe malnutrition  []1616.99 Moderate malnutrition  []1718.49 Mild malnutrition  []18.524.9 Normal  []2529.9 Overweight (not obese)  []3034.9 Obese class 1 (Low Risk)  [x]3539.9 Obese class 2 (Moderate Risk)  []?40 Obese class 3 (High Risk)    RECENT LABS:   Lab Results   Component Value Date    WBC 9.4 2020    HGB 9.9 (L) 2020    HCT 31.9 (L) 2020     2020 Transthoracic Echocardiography Report (TTE)  Patient Name DEANA     Date of Study             12/17/2020               Jose Mcclendon   Date of      2000  Gender                    Female  Birth   Age          23 year(s)  Race                         Room Number  0999        Height:                   62 inch, 157.48 cm   Corporate ID [id number]    Weight:                   197 pounds, 89.4 kg  #   Patient Acct [de-identified]   BSA:         1.9 m^2      BMI:       36.03 kg/m^2  #   MR #         [id number]     Sonographer               Markell Thomas   Accession #  [id number]  Interpreting Physician    [address]   Fellow                   Referring Nurse                           Practitioner   Interpreting             Referring Physician       Tabitha Lanza  Fellow                                             APRN-CNP  Type of Study   TTE procedure:2D Echocardiogram, M-Mode, Doppler, Color Doppler. Procedure Date Date: 12/17/2020 Start: 08:05 AM Study Location: [facility] Technical Quality: Adequate visualization Indications:Cardiac Evaluation. History / Tech. Comments: Procedure explained to patient. Smoker Patient Status: Inpatient Height: 62 inches Weight: 197 pounds BSA: 1.9 m^2 BMI: 36.03 kg/m^2 CONCLUSIONS Summary Left ventricle is normal in size, normal wall thickness, global left ventricular systolic function is normal. Calculated ejection fraction is 61%. Left atrium is normal in size. Inter-atrial septum is intact with no evidence for an atrial septal defect, by color doppler. Trivial mitral regurgitation. Tricuspid valve was not well visualized. Trivial tricuspid regurgitation. Estimated right ventricular systolic pressure is 32 mmHg.  Signature ----------------------------------------------------------------------------  Electronically signed by Shavon PhillipsSonographer) on 12/17/2020 11:44 AM ---------------------------------------------------------------------------- ----------------------------------------------------------------------------  Electronically signed by Franklin OwensInterpreting physician) on 12/17/2020  12:05 PM ---------------------------------------------------------------------------- FINDINGS Left Atrium Left atrium is normal in size. Inter-atrial septum is intact with no evidence for an atrial septal defect, by color doppler. Left Ventricle Left ventricle is normal in size, normal wall thickness, global left ventricular systolic function is normal, calculated ejection fraction is 61%. Right Atrium Right atrium is normal in size. Right Ventricle Normal right ventricular size and function. Mitral Valve Mitral valve sclerosis without stenosis. Trivial mitral regurgitation. Aortic Valve Aortic valve is trileaflet. No evidence of aortic insufficiency or stenosis. Tricuspid Valve Tricuspid valve was not well visualized. Trivial tricuspid regurgitation. Estimated right ventricular systolic pressure is 32 mmHg. Pulmonic Valve Pulmonic valve not well visualized but Doppler velocities are normal. Trivial pulmonic insufficiency. Pericardial Effusion No significant pericardial effusion is seen. Miscellaneous Normal aortic root dimension. E/E' average = 4.85. IVC normal diameter & inspiratory collapse indicating normal RA filling pressure .  M-mode / 2D Measurements & Calculations:   LVIDd:5.3 cm(3.7 - 5.6 cm)        Diastolic XUQJBU:33.6 ml  MLVI:8.1 cm(0.6 - 1.1 cm)         Systolic ORVFPS:49.4 ml  RAIQE:4.2 cm(0.6 - 1.1 cm)        Aortic Root:2.5 cm(2.0 - 3.7 cm)                                    LA Dimension: 3.3 cm(1.9 - 4.0 cm)  Calculated LVEF (%): 61.56 %      LA volume/Index: 47.3 ml /25m^2                                    LVOT:2.1 cm                                    RVDd:2.6 cm   Mitral:                                 Aortic   Valve Area (P1/2-Time): 4.58 cm^2       Peak Velocity: 1.48 m/s  Peak E-Wave: 0.78 m/s                   Mean Velocity: 1.05 m/s Peak A-Wave: 0.48 m/s                   Peak Gradient: 8.76 mmHg  E/A Ratio: 1.63                         Mean Gradient: 5 mmHg  Peak Gradient: 2.41 mmHg  Mean Gradient: 2 mmHg  Deceleration Time: 180 msec             Area (continuity): 3.02 cm^2  P1/2t: 48 msec                          AV VTI: 27.6 cm   Area (continuity): 3.69 cm^2  Mean Velocity: 0.71 m/s   Tricuspid:                              Pulmonic:   Estimated RVSP: 32 mmHg                 Peak Velocity: 1.25 m/s  Peak TR Velocity: 2.62 m/s              Peak Gradient: 6.25 mmHg  Peak TR Gradient: 27.4576 mmHg  Diastology / Tissue Doppler Septal Wall E' velocity:0.16 m/s Septal Wall E/E':4.8 Lateral Wall E' velocity:0.16 m/s Lateral Wall E/E':4.9    Ct Head Wo Contrast    Result Date: 12/15/2020 EXAMINATION: CT OF THE HEAD WITHOUT CONTRAST  12/15/2020 8:29 pm TECHNIQUE: CT of the head was performed without the administration of intravenous contrast. Dose modulation, iterative reconstruction, and/or weight based adjustment of the mA/kV was utilized to reduce the radiation dose to as low as reasonably achievable. COMPARISON: None. HISTORY: ORDERING SYSTEM PROVIDED HISTORY: headache TECHNOLOGIST PROVIDED HISTORY: headache Is the patient pregnant?->No Reason for Exam: Migraine for 1 week Acuity: Acute Type of Exam: Initial FINDINGS: BRAIN/VENTRICLES: Abnormal increased attenuation is seen within the prepontine cistern, suspicious or prepontine hemorrhage. No hemorrhage is identified within the basilar cisterns, or interpeduncular cistern. No subarachnoid hemorrhage is seen over the cerebral convexities. .  The gray-white differentiation is maintained without evidence of an acute infarct. There is no evidence of hydrocephalus. No mass effect is present. A partially calcified 9 mm aneurysm is present involving the left carotid terminus. ORBITS: The visualized portion of the orbits demonstrate no acute abnormality. SINUSES: The visualized paranasal sinuses and mastoid air cells demonstrate no acute abnormality. SOFT TISSUES/SKULL:  No acute abnormality of the visualized skull or soft tissues. 1. Abnormally increased attenuation within the prepontine cistern, suspicious for hemorrhage. 2. Partially calcified 9.3 cm aneurysm involving the left carotid terminus.  3.  Critical results were called by Dr. Zion Patel to [facility] CARDO[facility] on 12/15/2020 at 21:14. RECOMMENDATIONS: Dedicated CTA of the intracranial circulation recommended for further evaluation     Cta Head W Contrast    Result Date: 12/15/2020 EXAMINATION: CTA OF THE HEAD WITH CONTRAST 12/15/2020 10:40 pm: TECHNIQUE: CTA of the head/brain was performed with the administration of intravenous contrast. Multiplanar reformatted images are provided for review. MIP images are provided for review. Dose modulation, iterative reconstruction, and/or weight based adjustment of the mA/kV was utilized to reduce the radiation dose to as low as reasonably achievable. COMPARISON: CT head without contrast December 15, 2020 at 2138 hours HISTORY: ORDERING SYSTEM PROVIDED HISTORY: headache, abnormal Ct TECHNOLOGIST PROVIDED HISTORY: headache, abnormal Ct Reason for Exam: Headache for 1 week, abnormal CT, no prior surgeries to head Acuity: Acute Type of Exam: Initial FINDINGS: ANTERIOR CIRCULATION: No significant stenosis of the intracranial internal carotid, anterior cerebral, or middle cerebral arteries. No aneurysm. Tortuosity of the supraclinoid left ICA is present. POSTERIOR CIRCULATION: No significant stenosis of the vertebral, basilar, or posterior cerebral arteries. No aneurysm. OTHER: No dural venous sinus thrombosis on this non-dedicated study. BRAIN: No mass effect or midline shift. No extra-axial fluid collection. The gray-white differentiation is maintained. Left internal carotid artery supraclinoid tortuosity, which simulated appearance of aneurysm on noncontrast evaluation. Otherwise, unremarkable CT angiogram of the head. Labs and Images reviewed with:  [] Dr. Charley Rajan. Anya    [x] Dr. Ronal Beard  [] Dr. Mahin Mcintosh  [] There are no new interval images to review. PHYSICAL EXAM       CONSTITUTIONAL:  Well developed, well nourished, alert and oriented x 3, in no acute distress. GCS 15. Nontoxic. No dysarthria. No aphasia.    HEAD:  normocephalic, atraumatic    EYES:  PERRLA, EOMI.   ENT:  moist mucous membranes   NECK:  supple, symmetric   LUNGS:  Equal air entry bilaterally CARDIOVASCULAR:  normal s1 / s2, RRR, 2+ radial/dorsal tibialis/posterior tibial pulses bilaterally. ABDOMEN:  Soft, no rigidity   Skin  right groin has no ecchymosis C/D/I   NEUROLOGIC:  Mental Status:  A & O x3,awake             Cranial Nerves:    cranial nerves II-XII are grossly intact    Motor Exam:    Drift:  absent  Tone:  normal    Motor exam is symmetrical 5 out of 5 all extremities bilaterally    Sensory:    Touch:    Right Upper Extremity:  normal  Left Upper Extremity:  normal  Right Lower Extremity:  normal  Left Lower Extremity:  normal      Plantar Response:  Right:  downgoing  Left:  downgoing        Gait: Normal     DRAINS:  [x] There are no drains for Neuro Critical Care to monitor at this time. ASSESSMENT AND PLAN:       69-year-old female presents with a history of 1 week of right-sided headaches with gradual onset. There is associated photophobia and mild dizziness with this headache and no associated fevers, chills, diaphoresis, numbness/tingling, weakness, neck pain or back pain. CT head and CTA head and neck revealed concern for aneurysm and patient was transferred for neuro endovascular team to evaluate. Care plan has been discussed with attending and patient.     NEUROLOGIC:  - POD#1 from [facility] ICA stent placement  - AEDs    -Keppra 500 mg stopped   - ASA and Plavix s/p stent   -Low dose Heparin gtt stopped   -325 mg ASA Daily and 75 mg plavix Daily  - Goal SBP < 140 mmHg  - Nimodipine 60 mg Q4H  - Neuro checks per protocol    CARDIOVASCULAR:  - Goal SBP < 140 mmHg  - Continue telemetry    PULMONARY:  - encourage incentive inspirometry    RENAL/FLUID/ELECTROLYTE:  - BUN 10/ Creatinine 0.69  - Replace electrolytes PRN  - Daily BMP    GI/NUTRITION:  NUTRITION:  DIET GENERAL;  - Bowel regimen: senna;  - GI prophylaxis: none needed    ID:  - Tmax 37.2  - WBC 9.4  - Daily CBC    HEME:   - H&H 10.4->9.9  - Platelets 122  - Daily CBC    ENDOCRINE: - Continue to monitor blood glucose, goal <180  - SSI    OTHER:  - Chronic Back pain   - Managed out pt    - Start 300 mg Gabapentin QHS  - PT/OT/ST to be done after neuro endovascular intervention  - Code Status: Full Code    PROPHYLAXIS:  Stress ulcer: none    DVT PROPHYLAXIS:  - SCD sleeves - Thigh High   - ASA  - Plavix  -Heparin subcu    DISPOSITION:  Possible discharge today    We will continue to follow along. For any changes in exam or patient status please contact Neuro Critical Care.       Norman Antonio,   Neuro Critical Care  EM PGY-2  12/19/2020     6:05 AM

## 2020-12-19 NOTE — PROGRESS NOTES
Occupational [address]  Occupational Therapy Not Seen Note    DATE: 2020  Name: Katya Cain  : 2000  MRN: [id number]    Patient not available for Occupational Therapy due to:    [x] Pt is currently completing functional mobility and ADL tasks independently independent with functional mobility and functional tasks.  Pt with no OT acute care needs at this time, will defer OT eval.       Next Scheduled Treatment: Defer-pt independent     Joaquín Jacobs OTR/L

## 2020-12-19 NOTE — PROGRESS NOTES
DATE: 2020    NAME: Vitaliy Gr  MRN: [id number]   : 2000    Patient not seen this date for Physical Therapy due to:  [] Blood transfusion in progress  [] Hemodialysis  [] Patient Declined  [] Spine Precautions   [] Strict Bedrest  [] Surgery/ Procedure  [] Testing      [] Other        [x] PT is being discontinued at this time. Patient seen walking in room independently with no concerns for balance. Pt declines need for PT.    [] PT is being discontinued at this time due to declining physical/ medical status. Therapy is not appropriate at this time.     Starr Marte, PT, DPT, CMPT

## 2020-12-19 NOTE — PROGRESS NOTES
Endovascular Neurosurgery Progress Note    SUBJECTIVE:   No reported events overnight. pt denies new focal symptoms. Review of Systems:  CONSTITUTIONAL:  negative for fevers, chills, fatigue and malaise    EYES:  negative for double vision, blurred vision and photophobia     HEENT:  negative for tinnitus, epistaxis and sore throat    RESPIRATORY:  negative for cough, shortness of breath, wheezing    CARDIOVASCULAR:  negative for chest pain, palpitations, syncope, edema    GASTROINTESTINAL:  negative for nausea, vomiting    GENITOURINARY:  negative for incontinence    MUSCULOSKELETAL:  negative for neck or back pain    NEUROLOGICAL:  Negative for weakness and tingling  negative for headaches and dizziness    PSYCHIATRIC:  negative for anxiety      Review of systems otherwise negative. OBJECTIVE:     Vitals:    12/18/20 2027   BP:    Pulse:    Resp: 16   Temp:    SpO2: 99%        General:  Gen: obese habitus, NAD  HEENT: NCAT, mucosa moist  Cvs: RRR, S1 S2 normal  Resp: symmetric unlabored breathing  Abd: s/nd/nt  Ext: no edema  Skin: no lesions seen, warm and dry. R groin no hematoma     Neuro:  Gen: awake and alert, oriented x3. Lang/speech: no aphasia or dysarthria. Follows commands. CN: PERRL, EOMI, VFF, V1-3 intact, face symmetric, hearing intact, shoulder shrug symmetric, tongue midline  Motor: grossly 5/5 UE and LE b/l  Sense: LT intact in all 4 ext. Coord: FTN and HTS intact b/l  DTR: deferred  Gait: deferred     NIH Stroke Scale:   1a  Level of consciousness: 0 - alert; keenly responsive   1b. LOC questions:  0 - answers both questions correctly   1c. LOC commands: 0 - performs both tasks correctly   2. Best Gaze: 0 - normal   3. Visual: 0 - no visual loss   4. Facial Palsy: 0 - normal symmetric movement   5a. Motor left arm: 0 - no drift, limb holds 90 (or 45) degrees for full 10 seconds   5b.   Motor right arm: 0 - no drift, limb holds 90 (or 45) degrees for full 10 seconds 6a. Motor left le - no drift; leg holds 30 degree position for full 5 seconds   6b  Motor right le - no drift; leg holds 30 degree position for full 5 seconds   7. Limb Ataxia: 0 - absent   8. Sensory: 0 - normal; no sensory loss   9. Best Language:  0 - no aphasia, normal   10. Dysarthria: 0 - normal   11. Extinction and Inattention: 0 - no abnormality             Total:   0      MRS: 0        LABS:   Reviewed. RADIOLOGY:   Images were personally reviewed including:  DSA 2020  --left communicating ica dissecting aneurysms x2:              dimensions L2.88mm, H1mm, W2.2mm, neck 2.88mm              dimensions L2.93mm, H2mm, W2.62mm, neck 2.93mm  --dysplastic left communicating ica   --the above treated with [employer] evolve 4x25mm flow diverter stent  --jailing of the left REGINA with the FD stent, REGINA with patent flow. DSA 2020  --tortuous and dysplastic left communicating ICA, blister aneurysm present x2    Images were personally reviewed including:  CTA head 12/15/2020  Left internal carotid artery supraclinoid tortuosity, which simulated   appearance of aneurysm on noncontrast evaluation.      CT head 12/15/2020  1. Abnormally increased attenuation within the prepontine cistern, suspicious   for hemorrhage. 2. Partially calcified 9.3 cm aneurysm involving the left carotid terminus. ASSESSMENT:   18yo female with pmh of obesity, depression, asthma. Pt presents with 1x week of new headaches, imaging shows possible prepontine sah and L intracranial calcified ICA aneurysm vs. tortuous ICA. DSA 2020 showed presence of x2 dissecting aneurysms in a dysplastic L communicating ICA. S/p surpass evolve stent placement. There was jailing of the L REGINA, which remains patent. Pt is well. PLAN:   --SBP goal   --continue plavix 75  --increase asa to 325  --stop hep gtt after am antiplatelet medication dose. --check platelet fxn tests. --Upon discharge follow up with Dr. Pink in 2 weeks after discharge and Dr. Valerie Hobson in 3 months after discharge. Case discussed with Dr. Caitlin No attending.     Sarah Thayer MD, PhD    Stroke, [facility] Stroke [facility]  Electronically signed 12/18/2020 at 9:00 PM

## 2020-12-21 NOTE — CARE COORDINATION
.Stroke Follow Up Phone Call    Called phone number on record - No answer. [x]Attempt #1 Answering machine pickup with no name given.   No message left  []Attempt #2 (final attempt)      Electronically signed by Bri Parrish RN on 12/21/20 at 12:14 PM EST

## 2020-12-22 NOTE — CARE COORDINATION
..Stroke Follow Up Phone Call    Called phone number on record - No answer.      []Attempt #1    [x]Attempt #2 (final attempt)      Electronically signed by Nancy Kaplan RN on 12/22/20 at 9:53 AM EST

## 2020-12-30 ENCOUNTER — TELEPHONE (OUTPATIENT)
Dept: OBGYN CLINIC | Age: 20
End: 2020-12-30

## 2020-12-30 RX ORDER — MEGESTROL ACETATE 40 MG/1
80 TABLET ORAL 2 TIMES DAILY
Qty: 20 TABLET | Refills: 0 | Status: SHIPPED | OUTPATIENT
Start: 2020-12-30 | End: 2021-01-04 | Stop reason: SDUPTHER

## 2020-12-30 NOTE — TELEPHONE ENCOUNTER
Patient called in she was started on blood thinners on 12/17, she states today is day 11 of her period. She is bleeding very heavy soaking through a super tampon in 1 hour. She is asking for something to be called in to stop bleeding if able, or what she should do.

## 2020-12-30 NOTE — TELEPHONE ENCOUNTER
She should probably get some clotting study labs, ill check cbc, ptt/int. Is she still taking her chc- bcp if so I would recommend she discontinue this and I can give her megace to control the bleeding then I would recommend an appt to discuss alternate options such as progesterone only form of contraceptive. Monitor severe pain especially ones sided, heavy bleeding >1 tampon/pad hr, fevers/chills notify us, go to ER. I would also recommend she notify her specialist  Who prescribed the blood thinners of symptoms.

## 2021-01-04 ENCOUNTER — TELEPHONE (OUTPATIENT)
Dept: OBGYN CLINIC | Age: 21
End: 2021-01-04

## 2021-01-04 DIAGNOSIS — N92.0 MENORRHAGIA WITH REGULAR CYCLE: Primary | ICD-10-CM

## 2021-01-04 RX ORDER — MEGESTROL ACETATE 40 MG/1
80 TABLET ORAL 2 TIMES DAILY
Qty: 20 TABLET | Refills: 0 | Status: SHIPPED | OUTPATIENT
Start: 2021-01-04 | End: 2021-01-22 | Stop reason: ALTCHOICE

## 2021-01-04 NOTE — TELEPHONE ENCOUNTER
Signed original phone encounter by mistake, so this is repeat. Patient phoned office stating needs possible refill on Megace. Patient is on both ASA & Plavix and period is now on day 16 of heavy flow. Rafael De Guzman script of  Megace last night and still soaking tampon q hour. Refill?

## 2021-01-04 NOTE — TELEPHONE ENCOUNTER
Day 16 of flow. Since on both ASA & Plavix period very heavy - soaking tampon q hr. Megace was started last week. Took last dose last night. What to do? ? Refill? ?

## 2021-01-05 ENCOUNTER — APPOINTMENT (OUTPATIENT)
Dept: CT IMAGING | Age: 21
End: 2021-01-05
Payer: MEDICARE

## 2021-01-05 ENCOUNTER — HOSPITAL ENCOUNTER (EMERGENCY)
Age: 21
Discharge: HOME | End: 2021-01-05
Attending: EMERGENCY MEDICINE
Payer: MEDICARE

## 2021-01-05 ENCOUNTER — TELEPHONE (OUTPATIENT)
Dept: NEUROLOGY | Age: 21
End: 2021-01-05

## 2021-01-05 VITALS
RESPIRATION RATE: 16 BRPM | OXYGEN SATURATION: 98 % | SYSTOLIC BLOOD PRESSURE: 108 MMHG | DIASTOLIC BLOOD PRESSURE: 56 MMHG | HEART RATE: 96 BPM | TEMPERATURE: 98.8 F | HEIGHT: 62 IN | BODY MASS INDEX: 36.25 KG/M2 | WEIGHT: 197 LBS

## 2021-01-05 DIAGNOSIS — M79.602 LEFT ARM PAIN: Primary | ICD-10-CM

## 2021-01-05 DIAGNOSIS — M79.605 LEFT LEG PAIN: ICD-10-CM

## 2021-01-05 DIAGNOSIS — R20.2 HEMIPARESTHESIA: Primary | ICD-10-CM

## 2021-01-05 LAB
ABSOLUTE EOS #: 0.13 K/UL (ref 0–0.44)
ABSOLUTE IMMATURE GRANULOCYTE: 0.03 K/UL (ref 0–0.3)
ABSOLUTE LYMPH #: 2.46 K/UL (ref 1.1–3.7)
ABSOLUTE MONO #: 0.33 K/UL (ref 0.1–1.4)
ANION GAP SERPL CALCULATED.3IONS-SCNC: 12 MMOL/L (ref 9–17)
BASOPHILS # BLD: 0 % (ref 0–2)
BASOPHILS ABSOLUTE: 0.03 K/UL (ref 0–0.2)
BUN BLDV-MCNC: 14 MG/DL (ref 6–20)
BUN/CREAT BLD: 20 (ref 9–20)
CALCIUM SERPL-MCNC: 9.1 MG/DL (ref 8.6–10.4)
CHLORIDE BLD-SCNC: 105 MMOL/L (ref 98–107)
CO2: 22 MMOL/L (ref 20–31)
CREAT SERPL-MCNC: 0.71 MG/DL (ref 0.5–0.9)
DIFFERENTIAL TYPE: ABNORMAL
EOSINOPHILS RELATIVE PERCENT: 1 % (ref 1–4)
GFR AFRICAN AMERICAN: >60 ML/MIN
GFR NON-AFRICAN AMERICAN: >60 ML/MIN
GFR SERPL CREATININE-BSD FRML MDRD: NORMAL ML/MIN/{1.73_M2}
GFR SERPL CREATININE-BSD FRML MDRD: NORMAL ML/MIN/{1.73_M2}
GLUCOSE BLD-MCNC: 94 MG/DL (ref 70–99)
HCT VFR BLD CALC: 37.9 % (ref 36.3–47.1)
HEMOGLOBIN: 12.2 G/DL (ref 11.9–15.1)
IMMATURE GRANULOCYTES: 0 %
LYMPHOCYTES # BLD: 26 % (ref 25–45)
MCH RBC QN AUTO: 27.9 PG (ref 25.2–33.5)
MCHC RBC AUTO-ENTMCNC: 32.2 G/DL (ref 28.4–34.8)
MCV RBC AUTO: 86.5 FL (ref 82.6–102.9)
MONOCYTES # BLD: 4 % (ref 2–8)
NRBC AUTOMATED: 0 PER 100 WBC
PDW BLD-RTO: 12.5 % (ref 11.8–14.4)
PLATELET # BLD: 356 K/UL (ref 138–453)
PLATELET ESTIMATE: ABNORMAL
PMV BLD AUTO: 11.8 FL (ref 8.1–13.5)
POTASSIUM SERPL-SCNC: 3.9 MMOL/L (ref 3.7–5.3)
RBC # BLD: 4.38 M/UL (ref 3.95–5.11)
RBC # BLD: ABNORMAL 10*6/UL
SEG NEUTROPHILS: 69 % (ref 34–64)
SEGMENTED NEUTROPHILS ABSOLUTE COUNT: 6.55 K/UL (ref 1.5–8.1)
SODIUM BLD-SCNC: 139 MMOL/L (ref 135–144)
WBC # BLD: 9.5 K/UL (ref 4.5–13.5)
WBC # BLD: ABNORMAL 10*3/UL

## 2021-01-05 PROCEDURE — 93971 EXTREMITY STUDY: CPT

## 2021-01-05 PROCEDURE — 70450 CT HEAD/BRAIN W/O DYE: CPT

## 2021-01-05 PROCEDURE — 80048 BASIC METABOLIC PNL TOTAL CA: CPT

## 2021-01-05 PROCEDURE — 99283 EMERGENCY DEPT VISIT LOW MDM: CPT

## 2021-01-05 PROCEDURE — 85025 COMPLETE CBC W/AUTO DIFF WBC: CPT

## 2021-01-05 ASSESSMENT — PAIN SCALES - GENERAL: PAINLEVEL_OUTOF10: 4

## 2021-01-05 NOTE — ED PROVIDER NOTES
[address] [place] [address] ED  [facility]MENT[facility]      Pt Name: Miguel Ángel Boucher  MRN: [id number]  [place] 2000  Date ofevaluation: 1/5/2021  Provider: Julian Carter Dr       Chief Complaint   Patient presents with    Other     pain to left side of body x 4 days. pt recently diagnosed with brain aneurysm, called neurologist today who told her to come in     Headache     right sided head pain. denies vision changes. pt states she is on a blood thinner for aneurysm         HISTORY OF PRESENT ILLNESS  (Location/Symptom, Timing/Onset, Context/Setting, Quality, Duration, Modifying Factors, Severity.)   Miguel Ángel Boucher is a 23 y.o. female who presents to the emergency department with pain to the left arm and left leg over the last 3 to 4 days. Patient reports that her doctor told her that she was concerned about blood clot. Patient also recently had a aneurysm that required stenting in the middle December. Denies any headaches to me currently. No nausea vomiting. No further changes today. No definite alleviating aggravating factors. No other complaints. Nursing Notes were reviewed.     ALLERGIES     Latex    CURRENT MEDICATIONS       Previous Medications    ASPIRIN 325 MG EC TABLET    Take 1 tablet by mouth daily    BUPROPION (WELLBUTRIN XL) 150 MG EXTENDED RELEASE TABLET    Take 75 mg by mouth nightly    BUTALBITAL-ACETAMINOPHEN-CAFFEINE (FIORICET, ESGIC) -40 MG PER TABLET    Take 1 tablet by mouth every 6 hours as needed for Headaches    CLOPIDOGREL (PLAVIX) 75 MG TABLET    Take 1 tablet by mouth daily    FLUTICASONE (FLOVENT HFA) 110 MCG/ACT INHALER    Inhale 2 puffs into the lungs 2 times daily    LEVONORGESTREL-ETHINYL ESTRADIOL (SEASONALE) 0.15-0.03 MG PER TABLET    Take 1 tablet by mouth daily    LORATADINE (CLARITIN) 10 MG TABLET    Take 10 mg by mouth nightly    MEGESTROL (MEGACE) 40 MG TABLET    Take 2 tablets by mouth 2 times daily for 5 days METFORMIN (GLUCOPHAGE-XR) 500 MG EXTENDED RELEASE TABLET    Take 500 mg by mouth daily (with breakfast)    MONTELUKAST (SINGULAIR) 10 MG TABLET    Take 10 mg by mouth nightly    SERTRALINE (ZOLOFT) 25 MG TABLET    Take 25 mg by mouth nightly       PAST MEDICAL HISTORY         Diagnosis Date    Aneurysm of internal carotid artery     Asthma     Moderate episode of recurrent major depressive disorder ([facility] [address] 1/7/2019       SURGICAL HISTORY           Procedure Laterality Date    CARDIAC CATHETERIZATION  12/17/2020    left eye, stent left ICA    EYE SURGERY      lazy eye    OTHER SURGICAL HISTORY  12/16/2020    cerebral angiogram         FAMILY HISTORY           Problem Relation Age of Onset    Diabetes Maternal Cousin     No Known Problems Mother     Anemia Father     Breast Cancer Paternal Grandmother     Lung Cancer Paternal Grandfather      Family Status   Relation Name Status    MCousin  (Not Specified)    Mother  (Not Specified)    Father  (Not Specified)    PGM  (Not Specified)    PGF  (Not Specified)        SOCIAL HISTORY      reports that she has been smoking cigarettes. She has never used smokeless tobacco. She reports previous alcohol use. She reports that she does not use drugs. REVIEW OFSYSTEMS    (2-9 systems for level 4, 10 or more for level 5)   Review of Systems    Except as noted above the remainder of the review of systems was reviewed and negative. PHYSICAL EXAM    (up to 7 for level 4, 8 or more for level 5)     ED Triage Vitals [01/05/21 1610]   BP Temp Temp Source Pulse Resp SpO2 Height Weight   126/73 98.8 °F (37.1 °C) Oral 111 18 96 % 5' 2\" (1.575 m) 197 lb (89.4 kg)      Physical Exam  Constitutional:       Appearance: She is well-developed. HENT:      Head: Normocephalic and atraumatic. Neck:      Musculoskeletal: Normal range of motion and neck supple. Cardiovascular:      Rate and Rhythm: Normal rate and regular rhythm.    Pulmonary:      Effort: Pulmonary effort is unremarkable. Will discharge home. CONSULTS:  None    PROCEDURES:  Procedures        FINAL IMPRESSION      1. Left arm pain    2.  Left leg pain          DISPOSITION/PLAN   DISPOSITION Decision To Discharge 01/05/2021 06:33:57 PM      PATIENTREFERRED TO:   Nataliya Sanders MD  [address]  [phone]    In 3 days        DISCHARGE MEDICATIONS:     New Prescriptions    No medications on file           (Please note that portions of this note were completed with a voice recognition program.  Efforts were made to edit thedictations but occasionally words are mis-transcribed.)    LEANN Call PA-C  01/05/21 [id number]

## 2021-01-05 NOTE — TELEPHONE ENCOUNTER
Patient calling in complaining of burring stinging on left side of body started 3-4 days ago. Patient has follow up with you 1/22. +  Wants to know if it is anything serious.

## 2021-01-05 NOTE — ED PROVIDER NOTES
The patient was seen and examined by me in conjunction with the mid-level provider. I agree with his/her assessment and treatment plan. CT brain and ultrasounds are negative. Findings were discussed with the patient and she is released.      Swapnil Ogden MD  01/05/21 [id number]

## 2021-01-07 NOTE — TELEPHONE ENCOUNTER
Given that pt's treatment was on the left ICA, it is less likely that it is the cause of left sided symptoms. However the laterality is concerning for neurological etiology. review of chart shows that pt went to ER 1/5/2021 for this issue, CT head at the time negative for bleed or large stroke. Plan for MRI brain and MRA w/contrast, ideally prior to next visit. Tests ordered.

## 2021-01-19 DIAGNOSIS — N92.0 MENORRHAGIA WITH REGULAR CYCLE: ICD-10-CM

## 2021-01-19 RX ORDER — MEGESTROL ACETATE 40 MG/1
80 TABLET ORAL 2 TIMES DAILY
Qty: 20 TABLET | Refills: 0 | Status: CANCELLED | OUTPATIENT
Start: 2021-01-19 | End: 2021-01-24

## 2021-01-19 NOTE — TELEPHONE ENCOUNTER
Pt is requesting a refill on Plavix, Asprin     Pt states that she took that last dose of her medication yesterday, and is out of medication     Surgery date :12/17/2020    Follow up:1/22/2021

## 2021-01-20 ENCOUNTER — TELEMEDICINE (OUTPATIENT)
Dept: OBGYN CLINIC | Age: 21
End: 2021-01-20
Payer: MEDICARE

## 2021-01-20 DIAGNOSIS — Z86.79 S/P CEREBRAL ANEURYSM REPAIR: ICD-10-CM

## 2021-01-20 DIAGNOSIS — Z30.09 GENERAL COUNSELLING AND ADVICE ON CONTRACEPTION: ICD-10-CM

## 2021-01-20 DIAGNOSIS — N94.6 DYSMENORRHEA: ICD-10-CM

## 2021-01-20 DIAGNOSIS — N92.6 IRREGULAR MENSES: Primary | ICD-10-CM

## 2021-01-20 DIAGNOSIS — I67.1 ANEURYSM OF INTERNAL CAROTID ARTERY: ICD-10-CM

## 2021-01-20 DIAGNOSIS — I60.9 SUBARACHNOID HEMORRHAGE (HCC): ICD-10-CM

## 2021-01-20 DIAGNOSIS — Z98.890 S/P CEREBRAL ANEURYSM REPAIR: ICD-10-CM

## 2021-01-20 PROCEDURE — G8427 DOCREV CUR MEDS BY ELIG CLIN: HCPCS | Performed by: NURSE PRACTITIONER

## 2021-01-20 PROCEDURE — 99213 OFFICE O/P EST LOW 20 MIN: CPT | Performed by: NURSE PRACTITIONER

## 2021-01-20 RX ORDER — ASPIRIN 325 MG
TABLET, DELAYED RELEASE (ENTERIC COATED) ORAL
Qty: 30 TABLET | Refills: 0 | OUTPATIENT
Start: 2021-01-20

## 2021-01-20 RX ORDER — CLOPIDOGREL BISULFATE 75 MG/1
TABLET ORAL
Qty: 30 TABLET | Refills: 0 | OUTPATIENT
Start: 2021-01-20

## 2021-01-20 ASSESSMENT — ENCOUNTER SYMPTOMS
SHORTNESS OF BREATH: 0
WHEEZING: 0
NAUSEA: 0
VOMITING: 0

## 2021-01-20 NOTE — PROGRESS NOTES
Ina Ly (:  2000) is a 23 y.o. female,Established patient, here for evaluation of the following chief complaint(s): Menstrual Problem        ASSESSMENT/PLAN:  1. Irregular menses  -     HCG, Quantitative, Pregnancy; Future  -     Drospirenone 4 MG TABS; Take 28 tablets by mouth daily, Disp-28 tablet, R-3Normal  2. Dysmenorrhea  3. General counselling and advice on contraception  4. Aneurysm of internal carotid artery  5. S/P cerebral aneurysm repair  6. Subarachnoid hemorrhage ([facility] [address]    She was counseled on all forms of hormonal contraceptives, at this point given recent hx avoiding chc. Will start on POP and considering IUD. She was sexually active 1 week ago recommend HCG prior to initing bcp, order placed. She        Return in about 3 months (around 2021) for med check. SUBJECTIVE/OBJECTIVE:  HPI      She has hx of menorrhagia and has been on chc for years- seasonale. She was having cycle every 3 months lasting around 7 days, She had been in for PAP on 20 doing well. She then developed severe headache on 12/15/20 and went to ER and found to have brain aneurysm. 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 She was discharged home on plavix and ASA 325mg she has stopped CHC and has been having vaginal bleeding since then. She has been given megace X2 for bleeding. She denies heavy or clots or significant cramping, denies vaginal d/c or odor. She would like to restart a form of birth control to control bleeding.       She reports there is a personal history or family history of:    Smoking (> 15 cigs/day): no- quit last month    Migraine with Aura: no    HTN (> 160/100):  no    DVT:  no    Thrombophilias:  no    Stroke (CVA): no    Ischemic heart disease:  no    Valvular heart disease (A Fib, Pul HTN, etc): no    Positive Antiphospholipid Abs:  no    Liver Disease:  no  Hx of STI/PIDdenies      Review of Systems Constitutional: Negative for chills and fever. Respiratory: Negative for shortness of breath and wheezing. Cardiovascular: Negative for chest pain and leg swelling. Gastrointestinal: Negative for nausea and vomiting. Genitourinary: Positive for menstrual problem and vaginal bleeding. Negative for pelvic pain, vaginal discharge and vaginal pain. No flowsheet data found.       Physical Exam    [INSTRUCTIONS:  \"[x]\" Indicates a positive item  \"[]\" Indicates a negative item  -- DELETE ALL ITEMS NOT EXAMINED]    Constitutional: [x] Appears well-developed and well-nourished [x] No apparent distress      [] Abnormal -     Mental status: [x] Alert and awake  [x] Oriented to person/place/time [x] Able to follow commands    [] Abnormal -     Eyes:   EOM    [x]  Normal    [] Abnormal -   Sclera  [x]  Normal    [] Abnormal -          Discharge [x]  None visible   [] Abnormal -     HENT: [x] Normocephalic, atraumatic  [] Abnormal -   [x] Mouth/Throat: Mucous membranes are moist    External Ears [x] Normal  [] Abnormal -    Neck: [x] No visualized mass [] Abnormal -     Pulmonary/Chest: [x] Respiratory effort normal   [x] No visualized signs of difficulty breathing or respiratory distress        [] Abnormal -      Musculoskeletal:   [x] Normal gait with no signs of ataxia         [x] Normal range of motion of neck        [] Abnormal -     Neurological:        [x] No Facial Asymmetry (Cranial nerve 7 motor function) (limited exam due to video visit)          [x] No gaze palsy        [] Abnormal -          Skin:        [x] No significant exanthematous lesions or discoloration noted on facial skin         [] Abnormal -            Psychiatric:       [x] Normal Affect [] Abnormal -        [x] No Hallucinations    Other pertinent observable physical exam findings:- On this date 01/20/21 I have spent 20 minutes reviewing previous notes, test results and face to face (virtual) with the patient discussing the diagnosis and importance of compliance with the treatment plan as well as documenting on the day of the visit. Sylvester Cheng is a 23 y.o. female being evaluated by a Virtual Visit (video visit) encounter to address concerns as mentioned above. A caregiver was present when appropriate. Due to this being a TeleHealth encounter (During YRFFA-22 public health emergency), evaluation of the following organ systems was limited: Vitals/Constitutional/EENT/Resp/CV/GI//MS/Neuro/Skin/Heme-Lymph-Imm. Pursuant to the emergency declaration under the [address] [place] [address] authority and the [employer] Act, this Virtual Visit was conducted with patient's (and/or legal guardian's) consent, to reduce the patient's risk of exposure to COVID-19 and provide necessary medical care. The patient (and/or legal guardian) has also been advised to contact this office for worsening conditions or problems, and seek emergency medical treatment and/or call 911 if deemed necessary. Patient identification was verified at the start of the visit: Yes    Services were provided through a video synchronous discussion virtually to substitute for in-person clinic visit. Patient was located at home and provider was located in office or at home. An electronic signature was used to authenticate this note.     --Gus Hensley, APRN - CNP

## 2021-01-22 ENCOUNTER — OFFICE VISIT (OUTPATIENT)
Dept: NEUROLOGY | Age: 21
End: 2021-01-22
Payer: MEDICARE

## 2021-01-22 VITALS
DIASTOLIC BLOOD PRESSURE: 82 MMHG | HEART RATE: 102 BPM | WEIGHT: 193 LBS | SYSTOLIC BLOOD PRESSURE: 114 MMHG | BODY MASS INDEX: 35.3 KG/M2

## 2021-01-22 DIAGNOSIS — G43.009 MIGRAINE WITHOUT AURA AND WITHOUT STATUS MIGRAINOSUS, NOT INTRACTABLE: ICD-10-CM

## 2021-01-22 DIAGNOSIS — N92.0 MENORRHAGIA WITH REGULAR CYCLE: ICD-10-CM

## 2021-01-22 DIAGNOSIS — Z86.79 S/P CEREBRAL ANEURYSM REPAIR: ICD-10-CM

## 2021-01-22 DIAGNOSIS — Z98.890 S/P CEREBRAL ANEURYSM REPAIR: ICD-10-CM

## 2021-01-22 DIAGNOSIS — I67.1 ANEURYSM OF INTERNAL CAROTID ARTERY: Primary | ICD-10-CM

## 2021-01-22 PROCEDURE — G8484 FLU IMMUNIZE NO ADMIN: HCPCS | Performed by: PSYCHIATRY & NEUROLOGY

## 2021-01-22 PROCEDURE — G8427 DOCREV CUR MEDS BY ELIG CLIN: HCPCS | Performed by: PSYCHIATRY & NEUROLOGY

## 2021-01-22 PROCEDURE — 99214 OFFICE O/P EST MOD 30 MIN: CPT | Performed by: PSYCHIATRY & NEUROLOGY

## 2021-01-22 PROCEDURE — G8417 CALC BMI ABV UP PARAM F/U: HCPCS | Performed by: PSYCHIATRY & NEUROLOGY

## 2021-01-22 PROCEDURE — 4004F PT TOBACCO SCREEN RCVD TLK: CPT | Performed by: PSYCHIATRY & NEUROLOGY

## 2021-01-22 PROCEDURE — 1111F DSCHRG MED/CURRENT MED MERGE: CPT | Performed by: PSYCHIATRY & NEUROLOGY

## 2021-01-22 RX ORDER — CLOPIDOGREL BISULFATE 75 MG/1
75 TABLET ORAL DAILY
Qty: 30 TABLET | Refills: 5 | Status: SHIPPED
Start: 2021-01-22 | End: 2021-06-24 | Stop reason: HOSPADM

## 2021-01-22 RX ORDER — CALCIUM CARBONATE/VITAMIN D3 500-10/5ML
400 LIQUID (ML) ORAL DAILY
Qty: 30 CAPSULE | Refills: 5 | Status: SHIPPED | OUTPATIENT
Start: 2021-01-22 | End: 2021-07-23

## 2021-01-22 RX ORDER — ACETAMINOPHEN 500 MG
1000 TABLET ORAL 3 TIMES DAILY PRN
Qty: 180 TABLET | Refills: 5 | Status: SHIPPED | OUTPATIENT
Start: 2021-01-22 | End: 2021-06-17

## 2021-01-22 RX ORDER — CLOPIDOGREL BISULFATE 75 MG/1
75 TABLET ORAL DAILY
Qty: 30 TABLET | Refills: 0 | Status: SHIPPED | OUTPATIENT
Start: 2021-01-22 | End: 2021-01-22 | Stop reason: SDUPTHER

## 2021-01-22 RX ORDER — ASPIRIN 81 MG/1
81 TABLET ORAL DAILY
Qty: 90 TABLET | Refills: 5 | Status: SHIPPED | OUTPATIENT
Start: 2021-01-22 | End: 2022-02-04

## 2021-01-22 RX ORDER — GABAPENTIN 300 MG/1
300 CAPSULE ORAL
COMMUNITY
Start: 2021-01-12 | End: 2022-10-21 | Stop reason: SDUPTHER

## 2021-01-22 NOTE — PROGRESS NOTES
Endovascular Neurosurgery clinic note      Reason for evaluation: f/u L comm ICA aneurysm s/p FD device    SUBJECTIVE:   History of Chief Complaint:    16yo female with pmh of L exotropia, obesity, depression, asthma, migraine. Pt presented 12/15/2020 with headaches, imaging showed x2 dissecting aneurysms in a dysplastic L communicating ICA. S/p surpass evolve stent placement 12/17/2020. There was jailing of the L REGINA, which remains patent. Since procedure pt has had x5d (1/2/2021-1/7/2021) of burning and paraesthesias in her LUE and LLE now resolved. Pt presented to ED the first day this happened, CT head was negative at that time, and she was sent home. Writer recommended getting MRI/A head but this as not conveyed to the pt at that time. She also has complaint of menorrhagia/prolonged menses since procedure. Pt reports vaginal bleeding for ~30d since the procedure, her periods are typically 7d. changes to OCP and medical therapy from ob gyn have not been effective. Of note pt ran of asa and plavix rx, and has been off of meds since 1/19/2021. Last pt has complaint of recurrence of severe headaches onset in the last 3 days (1/19/2021). They are R frontal throbbing with associated photophobia, lasting ~2hrs at a time. She was given Fioricet but this has not been effective. She has tried a number of otc meds in the past, but the only thing that has been effective has been Excedrin. She has not tried any meds recently. Allergies  is allergic to latex. Medications  Prior to Admission medications    Medication Sig Start Date End Date Taking?  Authorizing Provider   gabapentin (NEURONTIN) 300 MG capsule  1/12/21  Yes Historical Provider, MD   aspirin EC 81 MG EC tablet Take 1 tablet by mouth daily 1/22/21  Yes Katrina Flanagan MD   clopidogrel (PLAVIX) 75 MG tablet Take 1 tablet by mouth daily 1/22/21  Yes Katrina Flanagan MD   acetaminophen (TYLENOL) 500 MG tablet Take 2 tablets by mouth 3 times daily as needed for Pain (migraine) 1/22/21  Yes Jason Dalal MD   Magnesium Oxide 400 MG CAPS Take 400 mg by mouth daily For migraine, take daily. 1/22/21  Yes Jason Dalal MD   Drospirenone 4 MG TABS Take 28 tablets by mouth daily 1/20/21  Yes LETY Child - CNP   butalbital-acetaminophen-caffeine (FIORICET, ESGIC) -40 MG per tablet Take 1 tablet by mouth every 6 hours as needed for Headaches 12/19/20  Yes Electmarimar Formosa, DO   metFORMIN (GLUCOPHAGE-XR) 500 MG extended release tablet Take 500 mg by mouth daily (with breakfast)   Yes Historical Provider, MD   montelukast (SINGULAIR) 10 MG tablet Take 10 mg by mouth nightly   Yes Historical Provider, MD   sertraline (ZOLOFT) 25 MG tablet Take 25 mg by mouth nightly   Yes Historical Provider, MD   buPROPion (WELLBUTRIN XL) 150 MG extended release tablet Take 75 mg by mouth nightly   Yes Historical Provider, MD   loratadine (CLARITIN) 10 MG tablet Take 10 mg by mouth nightly   Yes Historical Provider, MD   fluticasone (FLOVENT HFA) 110 MCG/ACT inhaler Inhale 2 puffs into the lungs 2 times daily 1/3/19 1/22/21 Yes Ruthie Eckert MD    Scheduled Meds:  Continuous Infusions:  PRN Meds:.  Past Medical History   has a past medical history of Aneurysm of internal carotid artery, Asthma, and Moderate episode of recurrent major depressive disorder ([facility] [address] Past Surgical History   has a past surgical history that includes Eye surgery; other surgical history (12/16/2020); and Cardiac catheterization (12/17/2020). Social History   reports that she has been smoking cigarettes. She has never used smokeless tobacco.   reports previous alcohol use. reports no history of drug use. Family History  family history includes Anemia in her father; Breast Cancer in her paternal grandmother; Diabetes in her maternal cousin; [place] in her paternal grandfather; No Known Problems in her mother.     Review of Systems:  CONSTITUTIONAL:  negative for fevers, chills, fatigue and malaise    EYES:  negative for double vision, blurred vision and photophobia     HEENT:  negative for tinnitus, epistaxis and sore throat    RESPIRATORY:  negative for cough, shortness of breath, wheezing    CARDIOVASCULAR:  negative for chest pain, palpitations, syncope, edema    GASTROINTESTINAL:  negative for nausea, vomiting    GENITOURINARY:  negative for incontinence    MUSCULOSKELETAL:  negative for neck or back pain    NEUROLOGICAL:  Negative for weakness and tingling  negative for headaches and dizziness    PSYCHIATRIC:  negative for anxiety      Review of systems otherwise negative. OBJECTIVE:     Vitals:    21 1001   BP: 114/82   Pulse: 102        General:  Gen: obese habitus, NAD  HEENT: NCAT, mucosa moist  Cvs: RRR, S1 S2 normal  Resp: symmetric unlabored breathing  Abd: s/nd/nt  Ext: no edema  Skin: no lesions seen, warm and dry. R groin no hematoma     Neuro:  Gen: awake and alert, oriented x3. Lang/speech: no aphasia or dysarthria. Follows commands. CN: PERRL, L eye exotropia, chronic. EOMI, VFF, V1-3 intact, face symmetric, hearing intact, shoulder shrug symmetric, tongue midline  Motor: grossly 5/5 UE and LE b/l  Sense: LT intact in all 4 ext. Coord: FTN and HTS intact b/l  DTR: deferred  Gait: deferred    NIH Stroke Scale:   1a  Level of consciousness: 0 - alert; keenly responsive   1b. LOC questions:  0 - answers both questions correctly   1c. LOC commands: 0 - performs both tasks correctly   2. Best Gaze: 0 - normal   3. Visual: 0 - no visual loss   4. Facial Palsy: 0 - normal symmetric movement   5a. Motor left arm: 0 - no drift, limb holds 90 (or 45) degrees for full 10 seconds   5b. Motor right arm: 0 - no drift, limb holds 90 (or 45) degrees for full 10 seconds   6a. Motor left le - no drift; leg holds 30 degree position for full 5 seconds   6b  Motor right le - no drift; leg holds 30 degree position for full 5 seconds   7. Limb Ataxia: 0 - absent   8. Sensory: 0 - normal; no sensory loss   9. Best Language:  0 - no aphasia, normal   10. Dysarthria: 0 - normal   11. Extinction and Inattention: 0 - no abnormality         Total:   0     MRS: 0      LABS:   Reviewed. RADIOLOGY:   Images were personally reviewed including:  CT head 1/5/2021  1. No acute intracranial hemorrhage, intra-axial mass, or acute territorial   infarct   2. Interval placement of a metallic stent within the supraclinoid portion of   the left internal carotid artery     DSA 12/17/2020  --tortuous and dysplastic left communicating ICA   --two dissecting left communicating ICA aneurysms: dimensions L2.88mm, H1mm, W2.2mm, neck 2.88mm and dimensions L2.93mm, H2mm, W2.62mm, neck 2.93mm   --the above treated with surpass evolve 4x25mm flow diverter stent   --jailing of the left anterior cerebral artery with the FD stent, left anterior cerebral artery with patent flow. ASSESSMENT:   16yo female with pmh of L exotropia, obesity, depression, asthma, migraine. Pt presented 12/15/2020 with headaches, imaging showed x2 dissecting aneurysms in a dysplastic L communicating ICA. S/p surpass evolve stent placement 12/17/2020. There was jailing of the L REGINA, which remains patent. Since procedure pt has had x5d of burning and paraesthesias in her LUE and LLE now resolved. While not on the side of her treated aneurysm cns etio should be ruled out. ddx includes spinal or peripheral etio. She also has complaint of menorrhagia/prolonged menses since procedure, changes to OCP and medical therapy from ob gyn have not been effective. Last pt has complaint of recurrence of severe headaches     PLAN:   # L ICA aneurysm s/p FD device  --continue plavix 75. Medication refilled  --decrease asa to 81 for increased vaginal bleeding, consider switching agents if this persists.   --f/u dr Ana Maria Gomez scheduled 3/29/2021    # transient L hemibody pain and paresthesia, unclear etio  --check mri brain wo con and mra head w con (ordered prior, but pt was not informed that she should get the imaging done)    # migraine headache  --start mg ox 400 daily  --tylenol 1000mg TID prn headache for abortive medication. Excedrin and tryptans relatively contraindicated given aneurysm hx.  --pt instructed to keep migraine journal.  --f/u dr Michelle Fernandez neuro scheduled 1/26/2021    Case discussed with Dr. Telles Me attending. pt is followed by dr. Francia Flaherty MD, PhD   Stroke, [facility] Stroke [facility]  Electronically signed 1/22/2021 at 10:58 AM

## 2021-01-26 ENCOUNTER — OFFICE VISIT (OUTPATIENT)
Dept: NEUROLOGY | Age: 21
End: 2021-01-26
Payer: MEDICARE

## 2021-01-26 VITALS
BODY MASS INDEX: 35.51 KG/M2 | HEIGHT: 62 IN | OXYGEN SATURATION: 97 % | DIASTOLIC BLOOD PRESSURE: 75 MMHG | WEIGHT: 193 LBS | SYSTOLIC BLOOD PRESSURE: 113 MMHG | TEMPERATURE: 96 F | HEART RATE: 103 BPM

## 2021-01-26 DIAGNOSIS — Z98.890 S/P CEREBRAL ANEURYSM REPAIR: ICD-10-CM

## 2021-01-26 DIAGNOSIS — Z86.79 S/P CEREBRAL ANEURYSM REPAIR: ICD-10-CM

## 2021-01-26 DIAGNOSIS — I67.1 ANEURYSM OF INTERNAL CAROTID ARTERY: ICD-10-CM

## 2021-01-26 DIAGNOSIS — N92.0 MENORRHAGIA WITH REGULAR CYCLE: ICD-10-CM

## 2021-01-26 DIAGNOSIS — R26.9 GAIT DIFFICULTY: Primary | ICD-10-CM

## 2021-01-26 DIAGNOSIS — G43.009 MIGRAINE WITHOUT AURA AND WITHOUT STATUS MIGRAINOSUS, NOT INTRACTABLE: ICD-10-CM

## 2021-01-26 DIAGNOSIS — R42 DIZZINESS: ICD-10-CM

## 2021-01-26 PROCEDURE — G8417 CALC BMI ABV UP PARAM F/U: HCPCS | Performed by: STUDENT IN AN ORGANIZED HEALTH CARE EDUCATION/TRAINING PROGRAM

## 2021-01-26 PROCEDURE — 4004F PT TOBACCO SCREEN RCVD TLK: CPT | Performed by: STUDENT IN AN ORGANIZED HEALTH CARE EDUCATION/TRAINING PROGRAM

## 2021-01-26 PROCEDURE — G8427 DOCREV CUR MEDS BY ELIG CLIN: HCPCS | Performed by: STUDENT IN AN ORGANIZED HEALTH CARE EDUCATION/TRAINING PROGRAM

## 2021-01-26 PROCEDURE — G8484 FLU IMMUNIZE NO ADMIN: HCPCS | Performed by: STUDENT IN AN ORGANIZED HEALTH CARE EDUCATION/TRAINING PROGRAM

## 2021-01-26 PROCEDURE — 99215 OFFICE O/P EST HI 40 MIN: CPT | Performed by: STUDENT IN AN ORGANIZED HEALTH CARE EDUCATION/TRAINING PROGRAM

## 2021-01-26 ASSESSMENT — ENCOUNTER SYMPTOMS
DIARRHEA: 0
BACK PAIN: 0
STRIDOR: 0
TROUBLE SWALLOWING: 0
APNEA: 0
COLOR CHANGE: 0
NAUSEA: 0
ABDOMINAL DISTENTION: 0
SHORTNESS OF BREATH: 0
CHOKING: 0
WHEEZING: 0
SORE THROAT: 0
CHEST TIGHTNESS: 0
CONSTIPATION: 0

## 2021-01-26 ASSESSMENT — VISUAL ACUITY: VA_NORMAL: 1

## 2021-01-26 NOTE — PROGRESS NOTES
[address] [place] [address], [place] [address], [facility], [address] [facility] , [address] [place] [address]  P: [phone]  F: [phone]    NEUROLOGY CLINIC NOTE     PATIENT NAME: Fallon Sparks  PATIENT MRN: [id number]  PRIMARY CARE PHYSICIAN: Jose Manuel Gutierrez MD    HPI:      Fallon Sparks is a 23 y.o. female who presents to clinic today for hospital follow-up. Patient had initially presented to the emergency room with a left ICA communicating dissecting aneurysm, she is status post surpass stent placement. Postprocedure, she did have paresthesias on the left side which was followed by ongoing menorrhagia and running out of her antiplatelet medication. Patient had one episode of right retro-orbital headache which resolved. Since that time, patient has followed up with neuro endovascular and has been resumed on antiplatelet therapy    Patient's aspirin was decreased by neuro endovascular to 81 mg due to persistent menorrhagia. She does follow-up with OB/GYN. Neurologically, she does not display any focal neurological deficits except for mild dizziness, She is continued on the dual antiplatelet therapy with a pending MRI/MRA. She is a smoker and has been given proper communication in regards to quitting. Since that singular episode of headache, patient has been headache free. She has Fioricet at home and has been recommended for as needed treatment if headaches persist.    Baseline, she is functional and able to perform her daily activities without any significant impairment. She has been compliant with her aspirin and Plavix.           PREVIOUS WORKUP:     Lab Results   Component Value Date    WBC 9.5 01/05/2021    HGB 12.2 01/05/2021    HCT 37.9 01/05/2021    MCV 86.5 01/05/2021     01/05/2021       Past Medical History:   Diagnosis Date    Aneurysm of internal carotid artery     Asthma     Moderate episode of recurrent major depressive disorder ([facility] [address] 1/7/2019        Past Surgical History:   Procedure Laterality Date    CARDIAC CATHETERIZATION  12/17/2020    left eye, stent left ICA    EYE SURGERY      lazy eye    OTHER SURGICAL HISTORY  12/16/2020    cerebral angiogram        Social History     Socioeconomic History    Marital status: Single     Spouse name: Not on file    Number of children: Not on file    Years of education: Not on file    Highest education level: Not on file   Occupational History    Not on file   Social Needs    Financial resource strain: Not on file    Food insecurity     Worry: Not on file     Inability: Not on file    Transportation needs     Medical: Not on file     Non-medical: Not on file   Tobacco Use    Smoking status: Current Some Day Smoker     Types: Cigarettes    Smokeless tobacco: Never Used   Substance and Sexual Activity    Alcohol use: Not Currently     Comment: occaisonally    Drug use: No    Sexual activity: Not on file   Lifestyle    Physical activity     Days per week: Not on file     Minutes per session: Not on file    Stress: Not on file   Relationships    Social connections     Talks on phone: Not on file     Gets together: Not on file     Attends Mosque service: Not on file     Active member of club or organization: Not on file     Attends meetings of clubs or organizations: Not on file     Relationship status: Not on file    Intimate partner violence     Fear of current or ex partner: Not on file     Emotionally abused: Not on file     Physically abused: Not on file     Forced sexual activity: Not on file   Other Topics Concern    Not on file   Social History Narrative    Not on file        Current Outpatient Medications   Medication Sig Dispense Refill    gabapentin (NEURONTIN) 300 MG capsule       aspirin EC 81 MG EC tablet Take 1 tablet by mouth daily 90 tablet 5    clopidogrel (PLAVIX) 75 MG tablet Take 1 tablet by mouth daily 30 tablet 5    Magnesium Oxide 400 MG CAPS Take 400 mg by mouth daily For migraine, take daily. 30 capsule 5    Drospirenone 4 MG TABS Take 28 tablets by mouth daily 28 tablet 3    butalbital-acetaminophen-caffeine (FIORICET, ESGIC) -40 MG per tablet Take 1 tablet by mouth every 6 hours as needed for Headaches 20 tablet 0    metFORMIN (GLUCOPHAGE-XR) 500 MG extended release tablet Take 500 mg by mouth daily (with breakfast)      montelukast (SINGULAIR) 10 MG tablet Take 10 mg by mouth nightly      sertraline (ZOLOFT) 25 MG tablet Take 25 mg by mouth nightly      buPROPion (WELLBUTRIN XL) 150 MG extended release tablet Take 75 mg by mouth nightly      loratadine (CLARITIN) 10 MG tablet Take 10 mg by mouth nightly      fluticasone (FLOVENT HFA) 110 MCG/ACT inhaler Inhale 2 puffs into the lungs 2 times daily 1 Inhaler 3    acetaminophen (TYLENOL) 500 MG tablet Take 2 tablets by mouth 3 times daily as needed for Pain (migraine) (Patient not taking: Reported on 1/26/2021) 180 tablet 5     No current facility-administered medications for this visit. Allergies   Allergen Reactions    Latex         REVIEW OF SYSTEMS:     Review of Systems   Constitutional: Negative for chills, diaphoresis and fatigue. HENT: Negative for congestion, sneezing, sore throat, tinnitus and trouble swallowing. Respiratory: Negative for apnea, choking, chest tightness, shortness of breath, wheezing and stridor. Cardiovascular: Negative for chest pain, palpitations and leg swelling. Gastrointestinal: Negative for abdominal distention, constipation, diarrhea and nausea. Endocrine: Negative for cold intolerance and heat intolerance. Musculoskeletal: Negative for arthralgias, back pain, gait problem and joint swelling. Skin: Negative for color change and pallor. Neurological: Negative for dizziness, tremors, seizures, syncope, facial asymmetry, speech difficulty, weakness, light-headedness, numbness and headaches.    Psychiatric/Behavioral: Negative for agitation, behavioral problems, confusion, decreased concentration, dysphoric mood and hallucinations. The patient is not hyperactive. VITALS  /75   Pulse 103   Temp 96 °F (35.6 °C) (Temporal)   Ht 5' 2\" (1.575 m)   Wt 193 lb (87.5 kg)   SpO2 97%   BMI 35.30 kg/m²      PHYSICAL EXAMINATION:     Physical Exam  Vitals signs and nursing note reviewed. Constitutional:       General: She is awake. Appearance: Normal appearance. She is normal weight. HENT:      Head: Normocephalic and atraumatic. Right Ear: Hearing normal.      Left Ear: Hearing normal.   Eyes:      Extraocular Movements: Extraocular movements intact. Conjunctiva/sclera: Conjunctivae normal.      Pupils: Pupils are equal, round, and reactive to light. Neck:      Musculoskeletal: Normal range of motion and neck supple. Cardiovascular:      Rate and Rhythm: Normal rate and regular rhythm. Pulses: Normal pulses. Pulmonary:      Effort: Pulmonary effort is normal.      Breath sounds: Normal breath sounds. Abdominal:      General: Abdomen is flat. Bowel sounds are normal.      Palpations: Abdomen is soft. Musculoskeletal: Normal range of motion. Neurological:      Mental Status: She is alert. Coordination: Romberg sign negative. Deep Tendon Reflexes: Strength normal.      Reflex Scores:       Tricep reflexes are 2+ on the left side. Bicep reflexes are 2+ on the right side and 2+ on the left side. Brachioradialis reflexes are 2+ on the right side and 2+ on the left side. Patellar reflexes are 2+ on the right side and 2+ on the left side. Achilles reflexes are 2+ on the right side and 2+ on the left side. Psychiatric:         Mood and Affect: Mood normal.         Speech: Speech normal.         Thought Content: Thought content normal.          NEUROLOGICAL EXAMINATION:     Neurological Exam  Mental Status  Awake and alert. Oriented only to person, time and situation. Recalls 3 of 3 objects immediately. Speech is normal. Language is fluent with no aphasia. Able to perform serial calculations. Able to spell words backwards. Fund of knowledge is appropriate for level of education. Apraxia absent. Cranial Nerves  CN I: Sense of smell is normal.  CN II: Right visual acuity: normal. Left visual acuity: normal. Right normal visual field. Left normal visual field. CN III, IV, VI: Extraocular movements intact bilaterally. Pupils equal round and reactive to light bilaterally. CN V:  Right: Facial sensation is normal.  Left: Facial sensation is normal on the left. CN VII: Full and symmetric facial movement. CN VIII:  Right: Hearing is normal.  Left: Hearing is normal.  CN IX, X:  Right: Palate is normal.  Left: Palate is normal.  CN XI:  Right: Sternocleidomastoid strength is normal.    Motor  Normal muscle bulk throughout. Normal muscle tone. Strength is 5/5 throughout all four extremities. Sensory  Light touch is normal in upper and lower extremities. Pinprick is normal in upper and lower extremities. Temperature is normal in upper and lower extremities. Vibration is normal in upper and lower extremities. Proprioception is normal in upper and lower extremities. Reflexes                                           Right                      Left  Brachioradialis                    2+                         2+  Biceps                                 2+                         2+  Triceps                                                       2+  Finger flex                                                  2+  Hamstring                                                   2+  Patellar                                2+                         2+  Achilles                                2+                         2+  Plantar                           Downgoing                Downgoing    Right pathological reflexes: Wade's absent. Left pathological reflexes: Wade's absent.     Coordination  Right: Finger-to-nose normal. Rapid alternating movement normal. Heel-to-shin normal.  Left: Finger-to-nose normal. Rapid alternating movement normal. Heel-to-shin normal.    Gait  Casual gait is normal including stance, stride, and arm swing. Normal toe walking. Normal heel walking. Normal tandem gait. Romberg is absent. ASSESSMENT / PLAN:     58-year-old female status post surpass stent placement patient has been having menorrhagia since that time, etiology is unclear, aspirin was decreased from 325 to 81mg by neuro endovascular. She still remains on dual antiplatelet therapy with a repeat CBC and additional imaging studies pending. Risks of smoking have been extensively explained to the patient and she is recommended if she does have any further aches, she can take as needed, Fiorcet. .  Neuro endovascular will consider changing patient's antiplatelet if she persistently hemorrhages, GYN is following. Is recommended to follow-up in 3 months, if there is any persistent or worsening symptoms to call the office with any additional concerns. .    Neurological left ICA communicating surpass stent placement  -Monitor for any neurological changes  -Continue dual antiplatelet therapy  -Follow-up CBC  -Folate and vitamin B12 levels  -MRI/MRA pending as outpatient  -Follow-up with GYN for menorrhagia  -Endovascular follow-up as outpatient  -Please call if you headaches occur      Ms. Gaspar received counseling on the following healthy behaviors: medical compliance, smoking cessation, blood pressure control, regular follow up with primary doctor.         Electronically signed by Galo Lunsford MD on 1/26/2021 at 2:47 PM

## 2021-02-04 RX ORDER — LEVONORGESTREL AND ETHINYL ESTRADIOL 0.15-0.03
1 KIT ORAL DAILY
Qty: 1 PACKET | Refills: 3 | Status: SHIPPED | OUTPATIENT
Start: 2021-02-04 | End: 2022-01-25

## 2021-02-09 ENCOUNTER — HOSPITAL ENCOUNTER (OUTPATIENT)
Dept: MRI IMAGING | Age: 21
Discharge: HOME | End: 2021-02-11
Payer: MEDICARE

## 2021-02-09 DIAGNOSIS — R20.2 HEMIPARESTHESIA: ICD-10-CM

## 2021-02-09 PROCEDURE — 70551 MRI BRAIN STEM W/O DYE: CPT

## 2021-02-09 PROCEDURE — 70546 MR ANGIOGRAPH HEAD W/O&W/DYE: CPT

## 2021-02-09 PROCEDURE — 6360000004 HC RX CONTRAST MEDICATION: Performed by: STUDENT IN AN ORGANIZED HEALTH CARE EDUCATION/TRAINING PROGRAM

## 2021-02-09 PROCEDURE — A9576 INJ PROHANCE MULTIPACK: HCPCS | Performed by: STUDENT IN AN ORGANIZED HEALTH CARE EDUCATION/TRAINING PROGRAM

## 2021-02-09 RX ORDER — SODIUM CHLORIDE 0.9 % (FLUSH) 0.9 %
10 SYRINGE (ML) INJECTION PRN
Status: DISCONTINUED | OUTPATIENT
Start: 2021-02-09 | End: 2021-02-12 | Stop reason: HOSPADM

## 2021-02-09 RX ADMIN — GADOTERIDOL 17 ML: 279.3 INJECTION, SOLUTION INTRAVENOUS at 15:57

## 2021-03-29 ENCOUNTER — OFFICE VISIT (OUTPATIENT)
Dept: NEUROLOGY | Age: 21
End: 2021-03-29
Payer: MEDICARE

## 2021-03-29 VITALS
HEART RATE: 104 BPM | OXYGEN SATURATION: 98 % | BODY MASS INDEX: 35.51 KG/M2 | DIASTOLIC BLOOD PRESSURE: 75 MMHG | WEIGHT: 193 LBS | SYSTOLIC BLOOD PRESSURE: 112 MMHG | HEIGHT: 62 IN

## 2021-03-29 DIAGNOSIS — G43.009 MIGRAINE WITHOUT AURA AND WITHOUT STATUS MIGRAINOSUS, NOT INTRACTABLE: Primary | ICD-10-CM

## 2021-03-29 DIAGNOSIS — Z86.79 S/P CEREBRAL ANEURYSM REPAIR: ICD-10-CM

## 2021-03-29 DIAGNOSIS — Z98.890 S/P CEREBRAL ANEURYSM REPAIR: ICD-10-CM

## 2021-03-29 PROCEDURE — G8417 CALC BMI ABV UP PARAM F/U: HCPCS | Performed by: PSYCHIATRY & NEUROLOGY

## 2021-03-29 PROCEDURE — 4004F PT TOBACCO SCREEN RCVD TLK: CPT | Performed by: PSYCHIATRY & NEUROLOGY

## 2021-03-29 PROCEDURE — G8484 FLU IMMUNIZE NO ADMIN: HCPCS | Performed by: PSYCHIATRY & NEUROLOGY

## 2021-03-29 PROCEDURE — G8427 DOCREV CUR MEDS BY ELIG CLIN: HCPCS | Performed by: PSYCHIATRY & NEUROLOGY

## 2021-03-29 PROCEDURE — 99215 OFFICE O/P EST HI 40 MIN: CPT | Performed by: PSYCHIATRY & NEUROLOGY
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
## 2021-05-09 ENCOUNTER — HOSPITAL ENCOUNTER (EMERGENCY)
Age: 21
Discharge: HOME | End: 2021-05-09
Attending: EMERGENCY MEDICINE
Payer: MEDICARE

## 2021-05-09 ENCOUNTER — APPOINTMENT (OUTPATIENT)
Dept: CT IMAGING | Age: 21
End: 2021-05-09
Payer: MEDICARE

## 2021-05-09 VITALS
DIASTOLIC BLOOD PRESSURE: 73 MMHG | BODY MASS INDEX: 37.02 KG/M2 | HEART RATE: 98 BPM | WEIGHT: 201.2 LBS | SYSTOLIC BLOOD PRESSURE: 120 MMHG | OXYGEN SATURATION: 99 % | TEMPERATURE: 98.6 F | HEIGHT: 62 IN | RESPIRATION RATE: 16 BRPM

## 2021-05-09 DIAGNOSIS — G43.009 MIGRAINE WITHOUT AURA AND WITHOUT STATUS MIGRAINOSUS, NOT INTRACTABLE: Primary | ICD-10-CM

## 2021-05-09 PROCEDURE — 96374 THER/PROPH/DIAG INJ IV PUSH: CPT

## 2021-05-09 PROCEDURE — 96375 TX/PRO/DX INJ NEW DRUG ADDON: CPT

## 2021-05-09 PROCEDURE — 70450 CT HEAD/BRAIN W/O DYE: CPT

## 2021-05-09 PROCEDURE — 99283 EMERGENCY DEPT VISIT LOW MDM: CPT

## 2021-05-09 PROCEDURE — 6360000002 HC RX W HCPCS: Performed by: EMERGENCY MEDICINE

## 2021-05-09 PROCEDURE — 2580000003 HC RX 258: Performed by: EMERGENCY MEDICINE

## 2021-05-09 RX ORDER — DIPHENHYDRAMINE HYDROCHLORIDE 50 MG/ML
25 INJECTION INTRAMUSCULAR; INTRAVENOUS EVERY 6 HOURS PRN
Status: DISCONTINUED | OUTPATIENT
Start: 2021-05-09 | End: 2021-05-09 | Stop reason: HOSPADM

## 2021-05-09 RX ORDER — 0.9 % SODIUM CHLORIDE 0.9 %
1000 INTRAVENOUS SOLUTION INTRAVENOUS ONCE
Status: COMPLETED | OUTPATIENT
Start: 2021-05-09 | End: 2021-05-09

## 2021-05-09 RX ORDER — METOCLOPRAMIDE HYDROCHLORIDE 5 MG/ML
10 INJECTION INTRAMUSCULAR; INTRAVENOUS ONCE
Status: COMPLETED | OUTPATIENT
Start: 2021-05-09 | End: 2021-05-09

## 2021-05-09 RX ADMIN — SODIUM CHLORIDE 1000 ML: 9 INJECTION, SOLUTION INTRAVENOUS at 19:45

## 2021-05-09 RX ADMIN — DIPHENHYDRAMINE HYDROCHLORIDE 25 MG: 50 INJECTION, SOLUTION INTRAMUSCULAR; INTRAVENOUS at 19:46

## 2021-05-09 RX ADMIN — METOCLOPRAMIDE 10 MG: 5 INJECTION, SOLUTION INTRAMUSCULAR; INTRAVENOUS at 19:46

## 2021-05-09 ASSESSMENT — PAIN SCALES - GENERAL: PAINLEVEL_OUTOF10: 9

## 2021-05-09 NOTE — ED PROVIDER NOTES
EMERGENCY DEPARTMENT ENCOUNTER    Pt Name: Rona Schaumann  MRN: [id number]  [place] 2000  Date of evaluation: 5/9/21  CHIEF COMPLAINT       Chief Complaint   Patient presents with    Headache     HISTORY OF PRESENT ILLNESS   Patient is a 25-year-old female with PMH of carotid artery aneurysm status post clip 12/2020, who presents the ED complaining of headache. Headache started gradually last night. Headache located behind left eye. He took Tylenol with no improvement. Headache identical location, quality but less severe as previous migraine headaches. She has mild sensitivity to light. No other issues at this time. No fever, cough, shortness of breath, chest pain, abdominal pain. REVIEW OF SYSTEMS     Review of Systems   All other systems reviewed and are negative. PASTMEDICAL HISTORY     Past Medical History:   Diagnosis Date    Aneurysm of internal carotid artery     Asthma     Moderate episode of recurrent major depressive disorder ([facility] [address] 1/7/2019     SURGICAL HISTORY       Past Surgical History:   Procedure Laterality Date    CARDIAC CATHETERIZATION  12/17/2020    no stent here in tcc.     EYE SURGERY      lazy eye    OTHER SURGICAL HISTORY  12/16/2020    cerebral angiogram     CURRENT MEDICATIONS       Previous Medications    ACETAMINOPHEN (TYLENOL) 500 MG TABLET    Take 2 tablets by mouth 3 times daily as needed for Pain (migraine)    ASPIRIN EC 81 MG EC TABLET    Take 1 tablet by mouth daily    BUPROPION (WELLBUTRIN XL) 150 MG EXTENDED RELEASE TABLET    Take 75 mg by mouth nightly    BUTALBITAL-ACETAMINOPHEN-CAFFEINE (FIORICET, ESGIC) -40 MG PER TABLET    Take 1 tablet by mouth every 6 hours as needed for Headaches    CLOPIDOGREL (PLAVIX) 75 MG TABLET    Take 1 tablet by mouth daily    FLUTICASONE (FLOVENT HFA) 110 MCG/ACT INHALER    Inhale 2 puffs into the lungs 2 times daily    GABAPENTIN (NEURONTIN) 300 MG CAPSULE        LEVONORGESTREL-ETHINYL ESTRADIOL (SEASONALE) 0. 15-0.03 MG PER TABLET    Take 1 tablet by mouth daily    LORATADINE (CLARITIN) 10 MG TABLET    Take 10 mg by mouth nightly    MAGNESIUM OXIDE 400 MG CAPS    Take 400 mg by mouth daily For migraine, take daily. METFORMIN (GLUCOPHAGE-XR) 500 MG EXTENDED RELEASE TABLET    Take 500 mg by mouth daily (with breakfast)    MONTELUKAST (SINGULAIR) 10 MG TABLET    Take 10 mg by mouth nightly    SERTRALINE (ZOLOFT) 25 MG TABLET    Take 25 mg by mouth nightly     ALLERGIES     is allergic to latex. FAMILY HISTORY     She indicated that the status of her mother is unknown. She indicated that the status of her father is unknown. She indicated that the status of her paternal grandmother is unknown. She indicated that the status of her paternal grandfather is unknown. She indicated that the status of her maternal cousin is unknown. SOCIAL HISTORY       Social History     Tobacco Use    Smoking status: Current Some Day Smoker     Packs/day: 0.50     Types: Cigarettes    Smokeless tobacco: Never Used   Substance Use Topics    Alcohol use: Not Currently     Comment: occaisonally    Drug use: No     PHYSICAL EXAM     INITIAL VITALS: /73   Pulse 98   Temp 98.6 °F (37 °C)   Resp 16   Ht 5' 2\" (1.575 m)   Wt 201 lb 3.2 oz (91.3 kg)   LMP 04/30/2021   SpO2 99%   BMI 36.80 kg/m²    Physical Exam  HENT:      Head: Normocephalic. Right Ear: External ear normal.      Left Ear: External ear normal.      Nose: Nose normal.   Eyes:      Conjunctiva/sclera: Conjunctivae normal.   Cardiovascular:      Rate and Rhythm: Normal rate. Pulmonary:      Effort: Pulmonary effort is normal.   Abdominal:      General: Abdomen is flat. Skin:     General: Skin is dry. Neurological:      Mental Status: She is alert. Mental status is at baseline.    Psychiatric:         Mood and Affect: Mood normal.         Behavior: Behavior normal.         MEDICAL DECISION MAKING:   The patient is hemodynamically stable, afebrile, nontoxic-appearing. Physical exam remarkable. Based on history and exam unfortunate for her every presentation to the emergency room c/o a headache with history of aneurysms will lead to a head CT. ED plan for fluids, Reglan, Benadryl, head CT, reassess. DIAGNOSTIC RESULTS   EKG:All EKG's are interpreted by the Emergency Department Physician who either signs or Co-signs this chart in the absence of a cardiologist.        RADIOLOGY:All plain film, CT, MRI, and formal ultrasound images (except ED bedside ultrasound) are read by the radiologist, see reports below, unless otherwisenoted in MDM or here. CT Head WO Contrast   Final Result   No acute intracranial abnormality. LABS: All lab results were reviewed by myself, and all abnormals are listed below. Labs Reviewed - No data to display    EMERGENCY DEPARTMENTCOURSE:   Patient did well in the ED. I agree resolved with analgesic plan. CT head negative for acute abnormality. No further work-up indicated this time. Nursing notes reviewed. At this time this is what I find, the patient appears well and does not appear sick or toxic. I gave my usual and customary discussion of the risks and benefits of discharge versus admission. I answered the patient's questions. I gave the patient strict return precautions. Patient expressed understanding of the discharge instructions. Dictated but not reviewed. Vitals:    Vitals:    05/09/21 1921 05/09/21 1924   BP:  120/73   Pulse:  98   Resp:  16   Temp:  98.6 °F (37 °C)   SpO2:  99%   Weight: 201 lb 3.2 oz (91.3 kg)    Height: 5' 2\" (1.575 m)        The patient was given the following medications while in the emergency department:  Orders Placed This Encounter   Medications    0.9 % sodium chloride bolus    metoclopramide (REGLAN) injection 10 mg    diphenhydrAMINE (BENADRYL) injection 25 mg     CONSULTS:  None    FINAL IMPRESSION      1.  Migraine without aura and without status migrainosus, not intractable          DISPOSITION/PLAN   DISPOSITION        PATIENT REFERRED TO:  Ceci Perales MD  [facility] [address] [facility] [address] [facility]  [phone]    In 2 days      DISCHARGE MEDICATIONS:  New Prescriptions    No medications on file     Suzi Murrell MD  Attending Emergency Physician                    Kiana Grider MD  05/09/21 [id number]

## 2021-06-14 ENCOUNTER — HOSPITAL ENCOUNTER (OUTPATIENT)
Age: 21
Discharge: HOME | End: 2021-06-14
Payer: MEDICARE

## 2021-06-14 DIAGNOSIS — R42 DIZZINESS: ICD-10-CM

## 2021-06-14 LAB
ABSOLUTE EOS #: 0.07 K/UL (ref 0–0.44)
ABSOLUTE IMMATURE GRANULOCYTE: 0.03 K/UL (ref 0–0.3)
ABSOLUTE LYMPH #: 3.25 K/UL (ref 1.1–3.7)
ABSOLUTE MONO #: 0.45 K/UL (ref 0.1–1.4)
BASOPHILS # BLD: 1 % (ref 0–2)
BASOPHILS ABSOLUTE: 0.05 K/UL (ref 0–0.2)
DIFFERENTIAL TYPE: NORMAL
EOSINOPHILS RELATIVE PERCENT: 1 % (ref 1–4)
FOLATE: 12.7 NG/ML
HCT VFR BLD CALC: 40.4 % (ref 36.3–47.1)
HEMOGLOBIN: 12.7 G/DL (ref 11.9–15.1)
IMMATURE GRANULOCYTES: 0 %
LYMPHOCYTES # BLD: 34 % (ref 25–45)
MCH RBC QN AUTO: 26.7 PG (ref 25.2–33.5)
MCHC RBC AUTO-ENTMCNC: 31.4 G/DL (ref 28.4–34.8)
MCV RBC AUTO: 84.9 FL (ref 82.6–102.9)
MONOCYTES # BLD: 5 % (ref 2–8)
NRBC AUTOMATED: 0 PER 100 WBC
PDW BLD-RTO: 13.7 % (ref 11.8–14.4)
PLATELET # BLD: 309 K/UL (ref 138–453)
PLATELET ESTIMATE: NORMAL
PMV BLD AUTO: 12.1 FL (ref 8.1–13.5)
RBC # BLD: 4.76 M/UL (ref 3.95–5.11)
RBC # BLD: NORMAL 10*6/UL
SEG NEUTROPHILS: 59 % (ref 34–64)
SEGMENTED NEUTROPHILS ABSOLUTE COUNT: 5.68 K/UL (ref 1.5–8.1)
VITAMIN B-12: <150 PG/ML (ref 232–1245)
WBC # BLD: 9.5 K/UL (ref 4.5–13.5)
WBC # BLD: NORMAL 10*3/UL

## 2021-06-14 PROCEDURE — 82607 VITAMIN B-12: CPT

## 2021-06-14 PROCEDURE — 85025 COMPLETE CBC W/AUTO DIFF WBC: CPT

## 2021-06-14 PROCEDURE — 82746 ASSAY OF FOLIC ACID SERUM: CPT

## 2021-06-14 PROCEDURE — 36415 COLL VENOUS BLD VENIPUNCTURE: CPT

## 2021-06-15 ENCOUNTER — OFFICE VISIT (OUTPATIENT)
Dept: NEUROLOGY | Age: 21
End: 2021-06-15
Payer: MEDICARE

## 2021-06-15 VITALS
DIASTOLIC BLOOD PRESSURE: 66 MMHG | BODY MASS INDEX: 36.99 KG/M2 | WEIGHT: 201 LBS | OXYGEN SATURATION: 96 % | HEIGHT: 62 IN | HEART RATE: 98 BPM | SYSTOLIC BLOOD PRESSURE: 107 MMHG

## 2021-06-15 DIAGNOSIS — G43.109 MIGRAINE WITH AURA AND WITHOUT STATUS MIGRAINOSUS, NOT INTRACTABLE: Primary | ICD-10-CM

## 2021-06-15 DIAGNOSIS — E53.8 B12 DEFICIENCY: ICD-10-CM

## 2021-06-15 PROCEDURE — 99214 OFFICE O/P EST MOD 30 MIN: CPT | Performed by: STUDENT IN AN ORGANIZED HEALTH CARE EDUCATION/TRAINING PROGRAM

## 2021-06-15 PROCEDURE — G8417 CALC BMI ABV UP PARAM F/U: HCPCS | Performed by: STUDENT IN AN ORGANIZED HEALTH CARE EDUCATION/TRAINING PROGRAM

## 2021-06-15 PROCEDURE — 4004F PT TOBACCO SCREEN RCVD TLK: CPT | Performed by: STUDENT IN AN ORGANIZED HEALTH CARE EDUCATION/TRAINING PROGRAM

## 2021-06-15 PROCEDURE — G8427 DOCREV CUR MEDS BY ELIG CLIN: HCPCS | Performed by: STUDENT IN AN ORGANIZED HEALTH CARE EDUCATION/TRAINING PROGRAM

## 2021-06-15 RX ORDER — LANOLIN ALCOHOL/MO/W.PET/CERES
1000 CREAM (GRAM) TOPICAL DAILY
Qty: 30 TABLET | Refills: 3 | Status: SHIPPED | OUTPATIENT
Start: 2021-06-15 | End: 2021-10-14 | Stop reason: SDUPTHER

## 2021-06-15 RX ORDER — VERAPAMIL HYDROCHLORIDE 80 MG/1
80 TABLET ORAL 3 TIMES DAILY
Qty: 90 TABLET | Refills: 3 | Status: SHIPPED | OUTPATIENT
Start: 2021-06-15 | End: 2021-07-27 | Stop reason: ALTCHOICE

## 2021-06-15 RX ORDER — UBROGEPANT 50 MG/1
50 TABLET ORAL 2 TIMES DAILY PRN
Qty: 30 TABLET | Refills: 0 | Status: SHIPPED | OUTPATIENT
Start: 2021-06-15 | End: 2021-07-21

## 2021-06-15 ASSESSMENT — ENCOUNTER SYMPTOMS
CONSTIPATION: 0
DIARRHEA: 0
STRIDOR: 0
ABDOMINAL DISTENTION: 0
BACK PAIN: 0
CHEST TIGHTNESS: 0
NAUSEA: 0
SORE THROAT: 0
WHEEZING: 0
APNEA: 0
COLOR CHANGE: 0
TROUBLE SWALLOWING: 0
CHOKING: 0
SHORTNESS OF BREATH: 0

## 2021-06-15 ASSESSMENT — VISUAL ACUITY: VA_NORMAL: 1

## 2021-06-15 NOTE — PROGRESS NOTES
[address] [place] [address], [facility] [address], [facility], [address] [facility], [address] [place] [address]  P: [phone]  F: [phone]    NEUROLOGY CLINIC NOTE     PATIENT NAME: Yessy Vences  PATIENT MRN: [id number]  PRIMARY CARE PHYSICIAN: Hiral Eason MD    HPI:      Yessy Vences is a 23 y.o. female who presents to clinic today for follow-up evaluation. Patient was initially transferred from [facility] in December 2020 after presenting for a week of ongoing headache. 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 She had previously followed up with neuro endovascular and was recommended to discontinue Plavix on June 17 with a tentative repeat angiogram pending for June 24. She continues to have ongoing headaches which are retro-orbital and on the left side. This occasionally occurs with photophobia and phonophobia. They usually occur 3-4 times a week and last anywhere from 1 to 12 hours. She describes it 10/10 in intensity and throbbing in nature. There is no associated nausea and vomiting. She is extremely frustrated and had to go to the ER last month as her headaches have been progressive and persistent. She occasionally also gets lightheaded. She states that her menorrhagia has occurred more frequently recently. Her last hemoglobin was 9.3 but her B12 level was less than 150. She still looking for a primary care physician. At baseline, she is functional and able to perform her daily activities without any impairment.           PREVIOUS WORKUP:     Lab Results   Component Value Date    WBC 9.5 06/14/2021    HGB 12.7 06/14/2021    HCT 40.4 06/14/2021    MCV 84.9 06/14/2021     06/14/2021       Past Medical History:   Diagnosis Date    Aneurysm of internal carotid artery     Asthma     Moderate episode of recurrent major depressive disorder ([facility] [address] 1/7/2019 Past Surgical History:   Procedure Laterality Date    CARDIAC CATHETERIZATION  12/17/2020    no stent here in tcc.  EYE SURGERY      lazy eye    OTHER SURGICAL HISTORY  12/16/2020    cerebral angiogram        Social History     Socioeconomic History    Marital status: Single     Spouse name: Not on file    Number of children: Not on file    Years of education: Not on file    Highest education level: Not on file   Occupational History    Not on file   Tobacco Use    Smoking status: Current Some Day Smoker     Packs/day: 0.50     Types: Cigarettes    Smokeless tobacco: Never Used   Substance and Sexual Activity    Alcohol use: Not Currently     Comment: occaisonally    Drug use: No    Sexual activity: Not on file   Other Topics Concern    Not on file   Social History Narrative    Not on file     Social Determinants of Health     Financial Resource Strain:     Difficulty of Paying Living Expenses:    Food Insecurity:     Worried About Running Out of Food in the Last Year:     Ran Out of Food in the Last Year:    Transportation Needs:     Lack of Transportation (Medical):      Lack of Transportation (Non-Medical):    Physical Activity:     Days of Exercise per Week:     Minutes of Exercise per Session:    Stress:     Feeling of Stress :    Social Connections:     Frequency of Communication with Friends and Family:     Frequency of Social Gatherings with Friends and Family:     Attends [employer] Services:     Active Member of Clubs or Organizations:     Attends Club or Organization Meetings:     Marital Status:    Intimate Partner Violence:     Fear of Current or Ex-Partner:     Emotionally Abused:     Physically Abused:     Sexually Abused:         Current Outpatient Medications   Medication Sig Dispense Refill    levonorgestrel-ethinyl estradiol (SEASONALE) 0.15-0.03 MG per tablet Take 1 tablet by mouth daily 1 packet 3    gabapentin (NEURONTIN) 300 MG capsule       aspirin EC 81 MG EC tablet Take 1 tablet by mouth daily 90 tablet 5    clopidogrel (PLAVIX) 75 MG tablet Take 1 tablet by mouth daily 30 tablet 5    acetaminophen (TYLENOL) 500 MG tablet Take 2 tablets by mouth 3 times daily as needed for Pain (migraine) 180 tablet 5    Magnesium Oxide 400 MG CAPS Take 400 mg by mouth daily For migraine, take daily. 30 capsule 5    butalbital-acetaminophen-caffeine (FIORICET, ESGIC) -40 MG per tablet Take 1 tablet by mouth every 6 hours as needed for Headaches 20 tablet 0    metFORMIN (GLUCOPHAGE-XR) 500 MG extended release tablet Take 500 mg by mouth daily (with breakfast)      montelukast (SINGULAIR) 10 MG tablet Take 10 mg by mouth nightly      sertraline (ZOLOFT) 25 MG tablet Take 25 mg by mouth nightly      buPROPion (WELLBUTRIN XL) 150 MG extended release tablet Take 75 mg by mouth nightly      loratadine (CLARITIN) 10 MG tablet Take 10 mg by mouth nightly      fluticasone (FLOVENT HFA) 110 MCG/ACT inhaler Inhale 2 puffs into the lungs 2 times daily 1 Inhaler 3     No current facility-administered medications for this visit. Allergies   Allergen Reactions    Latex         REVIEW OF SYSTEMS:     Review of Systems   Constitutional: Negative for chills, diaphoresis and fatigue. HENT: Negative for congestion, sneezing, sore throat, tinnitus and trouble swallowing. Respiratory: Negative for apnea, choking, chest tightness, shortness of breath, wheezing and stridor. Cardiovascular: Negative for chest pain, palpitations and leg swelling. Gastrointestinal: Negative for abdominal distention, constipation, diarrhea and nausea. Endocrine: Negative for cold intolerance and heat intolerance. Musculoskeletal: Negative for arthralgias, back pain, gait problem and joint swelling. Skin: Negative for color change and pallor.    Neurological: Negative for dizziness, tremors, seizures, syncope, facial asymmetry, speech difficulty, weakness, light-headedness, numbness and headaches. Psychiatric/Behavioral: Negative for agitation, behavioral problems, confusion, decreased concentration, dysphoric mood and hallucinations. The patient is not hyperactive. VITALS  There were no vitals taken for this visit. PHYSICAL EXAMINATION:     Physical Exam  Vitals and nursing note reviewed. Constitutional:       General: She is awake. Appearance: Normal appearance. She is normal weight. HENT:      Head: Normocephalic and atraumatic. Right Ear: Hearing normal.      Left Ear: Hearing normal.   Eyes:      Extraocular Movements: Extraocular movements intact. Conjunctiva/sclera: Conjunctivae normal.      Pupils: Pupils are equal, round, and reactive to light. Cardiovascular:      Rate and Rhythm: Normal rate and regular rhythm. Pulses: Normal pulses. Pulmonary:      Effort: Pulmonary effort is normal.      Breath sounds: Normal breath sounds. Abdominal:      General: Abdomen is flat. Bowel sounds are normal.      Palpations: Abdomen is soft. Musculoskeletal:         General: Normal range of motion. Cervical back: Normal range of motion and neck supple. Neurological:      Mental Status: She is alert. Coordination: Romberg sign negative. Deep Tendon Reflexes: Strength normal.      Reflex Scores:       Tricep reflexes are 2+ on the left side. Bicep reflexes are 2+ on the right side and 2+ on the left side. Brachioradialis reflexes are 3+ on the right side and 2+ on the left side. Patellar reflexes are 2+ on the right side and 2+ on the left side. Achilles reflexes are 2+ on the right side and 2+ on the left side. Psychiatric:         Mood and Affect: Mood normal.         Speech: Speech normal.         Thought Content: Thought content normal.          NEUROLOGICAL EXAMINATION:     Neurological Exam  Mental Status  Awake and alert. Oriented only to person, time and situation. Recalls 3 of 3 objects immediately.  Speech is normal. Language is fluent with no aphasia. Able to perform serial calculations. Able to spell words backwards. Fund of knowledge is appropriate for level of education. Apraxia absent. Cranial Nerves  CN I: Sense of smell is normal.  CN II: Right visual acuity: normal. Left visual acuity: normal. Right normal visual field. Left normal visual field. CN III, IV, VI: Extraocular movements intact bilaterally. Pupils equal round and reactive to light bilaterally. CN V:  Right: Facial sensation is normal.  Left: Facial sensation is normal on the left. CN VII: Full and symmetric facial movement. CN VIII:  Right: Hearing is normal.  Left: Hearing is normal.  CN IX, X:  Right: Palate is normal.  Left: Palate is normal.  CN XI:  Right: Sternocleidomastoid strength is normal.    Motor  Normal muscle bulk throughout. Normal muscle tone. Strength is 5/5 throughout all four extremities. Sensory  Light touch is normal in upper and lower extremities. Pinprick is normal in upper and lower extremities. Temperature is normal in upper and lower extremities. Vibration is normal in upper and lower extremities. Proprioception is normal in upper and lower extremities. Reflexes                                           Right                      Left  Brachioradialis                    3+                         2+  Biceps                                 2+                         2+  Triceps                                                       2+  Finger flex                                                  2+  Hamstring                                                   2+  Patellar                                2+                         2+  Achilles                                2+                         2+  Plantar                           Downgoing                Downgoing    Right pathological reflexes: Wade's absent. Left pathological reflexes: Wade's absent.     Coordination  Right: Finger-to-nose normal. Rapid alternating movement normal. Heel-to-shin normal.  Left: Finger-to-nose normal. Rapid alternating movement normal. Heel-to-shin normal.    Gait  Casual gait is normal including stance, stride, and arm swing. Normal toe walking. Normal heel walking. Normal tandem gait. Romberg is absent. ASSESSMENT / PLAN:     59-year-old female who is status post surpass stenting of her dissecting aneurysm. Patient is currently on dual antiplatelet therapy. She is scheduled for an angiogram on June 24. According to neuro endovascular, Plavix is recommended to be discontinued on the 17th. She continues to have persistent headaches which are retro-orbital and ongoing. She is unable to take triptan's as an abortive medication due to history of aneurysms. We will proceed with verapamil as a prophylactic and ubrelvy as an abortive. Elise Sable can be repeated once but no more than twice in 1 day and 4 times in 1 week. Her vitamin B12 level was also subtherapeutic and we will recheck in 3 months and place her on 1000 mcg daily. Neurological-migraines/s/p surpass stenting  -Follow-up with neuro endovascular regarding discontinuation of Plavix. -We will add Elise Sable as an abortive for ongoing migraines  -We will also add Depakote 80 mg daily as preventative. -Vitamin B12 1000 mcg daily  -Repeat vitamin B12 level in 3 months.  -Cerebral angiogram pending, June 24.  -Follow-up in 2 months. MsSarita Monika Orellana received counseling on the following healthy behaviors: medical compliance, smoking cessation, blood pressure control, regular follow up with primary doctor.         Electronically signed by Mere Cheema MD on 6/15/2021 at 3:29 PM

## 2021-06-17 ENCOUNTER — OFFICE VISIT (OUTPATIENT)
Dept: FAMILY MEDICINE CLINIC | Age: 21
End: 2021-06-17
Payer: MEDICARE

## 2021-06-17 VITALS
HEART RATE: 101 BPM | BODY MASS INDEX: 37.06 KG/M2 | DIASTOLIC BLOOD PRESSURE: 70 MMHG | TEMPERATURE: 98 F | WEIGHT: 201.4 LBS | OXYGEN SATURATION: 98 % | SYSTOLIC BLOOD PRESSURE: 118 MMHG | HEIGHT: 62 IN

## 2021-06-17 DIAGNOSIS — G47.00 INSOMNIA, UNSPECIFIED TYPE: Primary | ICD-10-CM

## 2021-06-17 DIAGNOSIS — R06.83 SNORING: ICD-10-CM

## 2021-06-17 PROCEDURE — 99203 OFFICE O/P NEW LOW 30 MIN: CPT | Performed by: PHYSICIAN ASSISTANT

## 2021-06-17 PROCEDURE — 4004F PT TOBACCO SCREEN RCVD TLK: CPT | Performed by: PHYSICIAN ASSISTANT

## 2021-06-17 PROCEDURE — G8427 DOCREV CUR MEDS BY ELIG CLIN: HCPCS | Performed by: PHYSICIAN ASSISTANT

## 2021-06-17 PROCEDURE — G8417 CALC BMI ABV UP PARAM F/U: HCPCS | Performed by: PHYSICIAN ASSISTANT

## 2021-06-17 RX ORDER — RAMELTEON 8 MG/1
8 TABLET ORAL NIGHTLY PRN
Qty: 30 TABLET | Refills: 3 | Status: SHIPPED
Start: 2021-06-17 | End: 2021-06-23

## 2021-06-17 ASSESSMENT — ENCOUNTER SYMPTOMS
SHORTNESS OF BREATH: 0
ABDOMINAL PAIN: 0
CONSTIPATION: 0
VOMITING: 0
COUGH: 0
COLOR CHANGE: 0
NAUSEA: 0
WHEEZING: 0
DIARRHEA: 0

## 2021-06-17 NOTE — PROGRESS NOTES
Visit Information    Have you changed or started any medications since your last visit including any over-the-counter medicines, vitamins, or herbal medicines? no   Are you having any side effects from any of your medications? -  no  Have you stopped taking any of your medications? Is so, why? -  no    Have you seen any other physician or provider since your last visit? No  Have you had any other diagnostic tests since your last visit? No  Have you been seen in the emergency room and/or had an admission to a hospital since we last saw you? No  Have you had your routine dental cleaning in the past 6 months? no    Have you activated your [employer] account? If not, what are your barriers?  Yes     Patient Care Team:  Therese Lucas PA-C as PCP - General (Physician Assistant)  LETY Means CNP as Nurse Practitioner (Family Nurse Practitioner)    Medical History Review  Past Medical, Family, and Social History reviewed and  contribute to the patient presenting condition    Health Maintenance   Topic Date Due    Hepatitis C screen  Never done    Pneumococcal 0-64 years Vaccine (1 of 2 - PPSV23) Never done    COVID-19 Vaccine (1) Never done    HIV screen  Never done    HPV vaccine (2 - 3-dose series) 09/28/2015    Hepatitis A vaccine (2 of 2 - 2-dose series) 02/29/2016    Chlamydia screen  12/08/2019    Flu vaccine (Season Ended) 09/01/2021    DTaP/Tdap/Td vaccine (7 - Td or Tdap) 08/29/2022    Cervical cancer screen  12/08/2023    Hepatitis B vaccine  Completed    Varicella vaccine  Completed    Meningococcal (ACWY) vaccine  Completed    Hib vaccine  Aged Out

## 2021-06-17 NOTE — PROGRESS NOTES
[address] [facility]  [address] [facility] [address] [address]  Dept: [phone]  Dept Fax: [phone]    Shruti Arndt is a 23 y.o. female who presents today for her medical conditions/complaintsas noted below. Shruti Arndt is c/o of   Chief Complaint   Patient presents with    Insomnia     x 1 year   OTC medication do not help         HPI:     HPI    Patient new to the office today  She states 6 months ago she had been having severe headaches with inability to do anything. She went to [facility] and work up identified aneurysm of internal carotid artery. She had stent placed by Dr. Carmina Moon and has been doing well with this. She is on aspirin daily and plavix. She has a repeat angiogram planned in the near future. She does suffer from headaches and uses verapamil for prevention and ubrelvy for these as needed. She has been having issues with insomnia as well. This started years ago prior to the aneurysm. She has tried trazodone in the past from a prior provider. She also has tried OTC: zquil, melatonin, tylenol pm but did not help. She states mom also has trouble sleeping. She has trouble both falling asleep and staying asleep. She goes to bed at midnight but wakes frequently around 3,4, off and on sleep after waking up around 10-11. Believes she gets around 4 hours total most nights. She does try to stay in bed even if not sleeping. Cristina reports she does snore. Mom has told her as a child she was a sleep walker.  That stopped around age 16-14    Hemoglobin A1C (%)   Date Value   12/16/2020 5.0   11/06/2020 4.9   01/03/2019 5.0             ( goal A1Cis < 7)   No results found for: LABMICR  No results found for: LDLCHOLESTEROL, LDLCALC    (goal LDL is <100)   AST (U/L)   Date Value   12/20/2015 17     ALT (U/L)   Date Value   12/20/2015 11     BUN (mg/dL)   Date Value   01/05/2021 14     BP Readings from Last 3 Encounters:   06/17/21 Take 1 tablet by mouth daily 30 tablet 5    Magnesium Oxide 400 MG CAPS Take 400 mg by mouth daily For migraine, take daily. 30 capsule 5    montelukast (SINGULAIR) 10 MG tablet Take 10 mg by mouth nightly      buPROPion (WELLBUTRIN XL) 150 MG extended release tablet Take 150 mg by mouth nightly       loratadine (CLARITIN) 10 MG tablet Take 10 mg by mouth nightly      fluticasone (FLOVENT HFA) 110 MCG/ACT inhaler Inhale 2 puffs into the lungs 2 times daily 1 Inhaler 3     No current facility-administered medications for this visit. Allergies   Allergen Reactions    Latex        Health Maintenance   Topic Date Due    Hepatitis C screen  Never done    Pneumococcal 0-64 years Vaccine (1 of 2 - PPSV23) Never done    COVID-19 Vaccine (1) Never done    HIV screen  Never done    HPV vaccine (2 - 3-dose series) 09/28/2015    Hepatitis A vaccine (2 of 2 - 2-dose series) 02/29/2016    Chlamydia screen  12/08/2019    Flu vaccine (Season Ended) 09/01/2021    DTaP/Tdap/Td vaccine (7 - Td or Tdap) 08/29/2022    Cervical cancer screen  12/08/2023    Hepatitis B vaccine  Completed    Varicella vaccine  Completed    Meningococcal (ACWY) vaccine  Completed    Hib vaccine  Aged Out       Subjective:     Review of Systems   Constitutional: Negative for activity change, appetite change, fatigue, fever and unexpected weight change. /70 (Site: Left Upper Arm, Position: Sitting, Cuff Size: Medium Adult)   Pulse 101   Temp 98 °F (36.7 °C) (Tympanic)   Ht 5' 2\" (1.575 m)   Wt 201 lb 6.4 oz (91.4 kg)   SpO2 98%   BMI 36.84 kg/m²    Respiratory: Negative for cough, shortness of breath and wheezing. Cardiovascular: Negative for chest pain and palpitations. Gastrointestinal: Negative for abdominal pain, constipation, diarrhea, nausea and vomiting. Skin: Negative for color change, pallor, rash and wound. Neurological: Negative for weakness.    Psychiatric/Behavioral: Positive for sleep

## 2021-06-21 ENCOUNTER — PATIENT MESSAGE (OUTPATIENT)
Dept: FAMILY MEDICINE CLINIC | Age: 21
End: 2021-06-21

## 2021-06-21 NOTE — TELEPHONE ENCOUNTER
From: Teresa Moon  To: Joyce Whitt PA-C  Sent: 6/21/2021 3:11 PM EDT  Subject: Prescription Question    Hi, my Neuro doctor prescribed me Brody Armando but hasn't gave prior auth since last Tuesday and will not call me back or send it to the pharmacy and I have had really bad migraines since Friday and I'm miserable.  I was wondering if there was anyway you could prescribe me something to get by please

## 2021-06-22 ENCOUNTER — PATIENT MESSAGE (OUTPATIENT)
Dept: FAMILY MEDICINE CLINIC | Age: 21
End: 2021-06-22

## 2021-06-22 DIAGNOSIS — G47.00 INSOMNIA, UNSPECIFIED TYPE: Primary | ICD-10-CM

## 2021-06-22 NOTE — TELEPHONE ENCOUNTER
From: Vincent Said  To: Cl Medel PA-C  Sent: 6/22/2021 1:49 PM EDT  Subject: Prescription Question    Hi Arpita, I got a letter from [address] [place] [address] stating they denied the Ramelteon, they also said the preferred medications that they will approve are Estazolam (generic of Prosom), Temazepam 15 or 30mg (generic of Restoril), Zaleplon (generic of Sonata) & Zolpidem (generic of Ambien). Also thank you for letting me know about the urgent care, I ended up finally getting my meds this morning but I will remember that for next time!

## 2021-06-23 RX ORDER — ZOLPIDEM TARTRATE 5 MG/1
5 TABLET ORAL NIGHTLY PRN
Qty: 14 TABLET | Refills: 0 | Status: SHIPPED | OUTPATIENT
Start: 2021-06-23 | End: 2021-07-07

## 2021-06-24 ENCOUNTER — HOSPITAL ENCOUNTER (OUTPATIENT)
Dept: INTERVENTIONAL RADIOLOGY/VASCULAR | Age: 21
Discharge: HOME | End: 2021-06-26
Payer: MEDICARE

## 2021-06-24 VITALS
BODY MASS INDEX: 37.36 KG/M2 | HEART RATE: 82 BPM | RESPIRATION RATE: 18 BRPM | OXYGEN SATURATION: 99 % | TEMPERATURE: 99.2 F | DIASTOLIC BLOOD PRESSURE: 64 MMHG | WEIGHT: 203 LBS | HEIGHT: 62 IN | SYSTOLIC BLOOD PRESSURE: 116 MMHG

## 2021-06-24 DIAGNOSIS — I72.9 ANEURYSM (HCC): ICD-10-CM

## 2021-06-24 LAB
GFR NON-AFRICAN AMERICAN: >60 ML/MIN
GFR SERPL CREATININE-BSD FRML MDRD: >60 ML/MIN
GFR SERPL CREATININE-BSD FRML MDRD: NORMAL ML/MIN/{1.73_M2}
GLUCOSE BLD-MCNC: 101 MG/DL (ref 74–100)
HCG, PREGNANCY URINE (POC): NEGATIVE
POC BUN: 11 MG/DL (ref 8–26)
POC CHLORIDE: 108 MMOL/L (ref 98–107)
POC CREATININE: 0.57 MG/DL (ref 0.51–1.19)
POC HEMATOCRIT: 39 % (ref 36–46)
POC HEMOGLOBIN: 13.4 G/DL (ref 12–16)
POC POTASSIUM: 3.5 MMOL/L (ref 3.5–4.5)
POC SODIUM: 140 MMOL/L (ref 138–146)

## 2021-06-24 PROCEDURE — 84520 ASSAY OF UREA NITROGEN: CPT

## 2021-06-24 PROCEDURE — 6360000002 HC RX W HCPCS: Performed by: PSYCHIATRY & NEUROLOGY

## 2021-06-24 PROCEDURE — 82565 ASSAY OF CREATININE: CPT

## 2021-06-24 PROCEDURE — C1760 CLOSURE DEV, VASC: HCPCS

## 2021-06-24 PROCEDURE — 81025 URINE PREGNANCY TEST: CPT

## 2021-06-24 PROCEDURE — 99152 MOD SED SAME PHYS/QHP 5/>YRS: CPT | Performed by: PSYCHIATRY & NEUROLOGY

## 2021-06-24 PROCEDURE — C1887 CATHETER, GUIDING: HCPCS

## 2021-06-24 PROCEDURE — C1769 GUIDE WIRE: HCPCS

## 2021-06-24 PROCEDURE — 2709999900 HC NON-CHARGEABLE SUPPLY

## 2021-06-24 PROCEDURE — 85014 HEMATOCRIT: CPT

## 2021-06-24 PROCEDURE — C1894 INTRO/SHEATH, NON-LASER: HCPCS

## 2021-06-24 PROCEDURE — 7100000011 HC PHASE II RECOVERY - ADDTL 15 MIN

## 2021-06-24 PROCEDURE — 6360000002 HC RX W HCPCS: Performed by: STUDENT IN AN ORGANIZED HEALTH CARE EDUCATION/TRAINING PROGRAM

## 2021-06-24 PROCEDURE — 36224 PLACE CATH CAROTD ART: CPT | Performed by: PSYCHIATRY & NEUROLOGY

## 2021-06-24 PROCEDURE — 2580000003 HC RX 258: Performed by: PSYCHIATRY & NEUROLOGY

## 2021-06-24 PROCEDURE — 82435 ASSAY OF BLOOD CHLORIDE: CPT

## 2021-06-24 PROCEDURE — 84295 ASSAY OF SERUM SODIUM: CPT

## 2021-06-24 PROCEDURE — 82947 ASSAY GLUCOSE BLOOD QUANT: CPT

## 2021-06-24 PROCEDURE — 7100000010 HC PHASE II RECOVERY - FIRST 15 MIN

## 2021-06-24 PROCEDURE — 84132 ASSAY OF SERUM POTASSIUM: CPT

## 2021-06-24 PROCEDURE — 6360000004 HC RX CONTRAST MEDICATION: Performed by: PSYCHIATRY & NEUROLOGY

## 2021-06-24 RX ORDER — SODIUM CHLORIDE 0.9 % (FLUSH) 0.9 %
5-40 SYRINGE (ML) INJECTION EVERY 12 HOURS SCHEDULED
Status: DISCONTINUED | OUTPATIENT
Start: 2021-06-24 | End: 2021-06-27 | Stop reason: HOSPADM

## 2021-06-24 RX ORDER — HEPARIN SODIUM 5000 [USP'U]/ML
INJECTION, SOLUTION INTRAVENOUS; SUBCUTANEOUS
Status: COMPLETED | OUTPATIENT
Start: 2021-06-24 | End: 2021-06-24

## 2021-06-24 RX ORDER — SODIUM CHLORIDE 0.9 % (FLUSH) 0.9 %
5-40 SYRINGE (ML) INJECTION PRN
Status: DISCONTINUED | OUTPATIENT
Start: 2021-06-24 | End: 2021-06-27 | Stop reason: HOSPADM

## 2021-06-24 RX ORDER — FENTANYL CITRATE 50 UG/ML
INJECTION, SOLUTION INTRAMUSCULAR; INTRAVENOUS
Status: COMPLETED | OUTPATIENT
Start: 2021-06-24 | End: 2021-06-24

## 2021-06-24 RX ORDER — SODIUM CHLORIDE 9 MG/ML
INJECTION, SOLUTION INTRAVENOUS CONTINUOUS
Status: DISCONTINUED | OUTPATIENT
Start: 2021-06-24 | End: 2021-06-27 | Stop reason: HOSPADM

## 2021-06-24 RX ORDER — IODIXANOL 270 MG/ML
36 INJECTION, SOLUTION INTRAVASCULAR
Status: COMPLETED | OUTPATIENT
Start: 2021-06-24 | End: 2021-06-24

## 2021-06-24 RX ORDER — SODIUM CHLORIDE 9 MG/ML
25 INJECTION, SOLUTION INTRAVENOUS PRN
Status: DISCONTINUED | OUTPATIENT
Start: 2021-06-24 | End: 2021-06-27 | Stop reason: HOSPADM

## 2021-06-24 RX ORDER — MIDAZOLAM HYDROCHLORIDE 2 MG/2ML
INJECTION, SOLUTION INTRAMUSCULAR; INTRAVENOUS
Status: COMPLETED | OUTPATIENT
Start: 2021-06-24 | End: 2021-06-24

## 2021-06-24 RX ORDER — ONDANSETRON 2 MG/ML
4 INJECTION INTRAMUSCULAR; INTRAVENOUS EVERY 6 HOURS PRN
Status: DISCONTINUED | OUTPATIENT
Start: 2021-06-24 | End: 2021-06-27 | Stop reason: HOSPADM

## 2021-06-24 RX ORDER — ONDANSETRON 4 MG/1
4 TABLET, ORALLY DISINTEGRATING ORAL EVERY 8 HOURS PRN
Status: DISCONTINUED | OUTPATIENT
Start: 2021-06-24 | End: 2021-06-27 | Stop reason: HOSPADM

## 2021-06-24 RX ORDER — ACETAMINOPHEN 325 MG/1
650 TABLET ORAL EVERY 4 HOURS PRN
Status: DISCONTINUED | OUTPATIENT
Start: 2021-06-24 | End: 2021-06-27 | Stop reason: HOSPADM

## 2021-06-24 RX ADMIN — IODIXANOL 36 ML: 270 INJECTION, SOLUTION INTRAVASCULAR at 09:18

## 2021-06-24 RX ADMIN — HEPARIN SODIUM 2000 UNITS: 5000 INJECTION INTRAVENOUS; SUBCUTANEOUS at 08:51

## 2021-06-24 RX ADMIN — FENTANYL CITRATE 25 MCG: 50 INJECTION, SOLUTION INTRAMUSCULAR; INTRAVENOUS at 09:09

## 2021-06-24 RX ADMIN — CEFAZOLIN SODIUM 2000 MG: 10 INJECTION, POWDER, FOR SOLUTION INTRAVENOUS at 08:47

## 2021-06-24 RX ADMIN — FENTANYL CITRATE 50 MCG: 50 INJECTION, SOLUTION INTRAMUSCULAR; INTRAVENOUS at 08:45

## 2021-06-24 RX ADMIN — MIDAZOLAM HYDROCHLORIDE 1 MG: 1 INJECTION, SOLUTION INTRAMUSCULAR; INTRAVENOUS at 08:45

## 2021-06-24 RX ADMIN — SODIUM CHLORIDE: 9 INJECTION, SOLUTION INTRAVENOUS at 07:25

## 2021-06-24 NOTE — PROGRESS NOTES
Patient up walking in hallway. Gait steady while up. No hematoma or drainage to right groin after wallking.

## 2021-06-24 NOTE — H&P
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 Pt presents today for f/u dsa to eval treatment. Pt was last seen in clinic with dr. Candice Logan 3/29/2021. Pt has persistent headaches, but otherwise no new focal symptoms since that time. Allergies  is allergic to latex. Medications  Prior to Admission medications    Medication Sig Start Date End Date Taking? Authorizing Provider   sertraline (ZOLOFT) 50 MG tablet Take 50 mg by mouth daily  6/15/21  Yes Historical Provider, MD   Ubrogepant (UBRELVY) 50 MG TABS Take 50 mg by mouth 2 times daily as needed (migraines) Can repeat if not effective after 2 hours, no more than 2 times in one day and 4 times in one week 6/15/21  Yes Dereck Sanford MD   vitamin B-12 (CYANOCOBALAMIN) 1000 MCG tablet Take 1 tablet by mouth daily 6/15/21  Yes Dereck Sanford MD   verapamil (CALAN) 80 MG tablet Take 1 tablet by mouth 3 times daily 6/15/21  Yes Dereck Sanford MD   levonorgestrel-ethinyl estradiol (Derik Fought) 0.15-0.03 MG per tablet Take 1 tablet by mouth daily 2/4/21  Yes Waqas Pollock APRN - CNP   gabapentin (NEURONTIN) 300 MG capsule Take 300 mg by mouth. Daily as needed. Rare use 1/12/21  Yes Historical Provider, MD   aspirin EC 81 MG EC tablet Take 1 tablet by mouth daily 1/22/21  Yes Jayce Montiel MD   clopidogrel (PLAVIX) 75 MG tablet Take 1 tablet by mouth daily 1/22/21  Yes Jayce Montiel MD   Magnesium Oxide 400 MG CAPS Take 400 mg by mouth daily For migraine, take daily.  1/22/21  Yes Jayce Montiel MD   montelukast (SINGULAIR) 10 MG tablet Take 10 mg by mouth nightly   Yes Historical Provider, MD   buPROPion (WELLBUTRIN XL) 150 MG extended release tablet Take 150 mg by mouth nightly    Yes Historical Provider, MD   loratadine (CLARITIN) 10 MG tablet Take 10 mg by mouth nightly   Yes Historical Provider, MD   zolpidem (AMBIEN) 5 MG tablet Take 1 tablet by mouth nightly as needed for Sleep for up to 14 days. 6/23/21 7/7/21  Arpita Ortiz PA-C   fluticasone (FLOVENT HFA) 110 MCG/ACT inhaler Inhale 2 puffs into the lungs 2 times daily 1/3/19 6/15/21  Suleiman Oakes MD    Scheduled Meds:   sodium chloride flush  5-40 mL Intravenous 2 times per day     Continuous Infusions:   sodium chloride 75 mL/hr at 06/24/21 0725    sodium chloride       PRN Meds:.sodium chloride flush, sodium chloride  Past Medical History   has a past medical history of Aneurysm of internal carotid artery, Asthma, Carotid aneurysm, left ([facility] [address] Headache, History of angiography, and Moderate episode of recurrent major depressive disorder ([facility] [address] Past Surgical History   has a past surgical history that includes Eye surgery; other surgical history (12/16/2020); and Cardiac catheterization (12/17/2020). Social History   reports that she has been smoking cigarettes. She has been smoking about 0.25 packs per day. She has never used smokeless tobacco.   reports previous alcohol use. reports no history of drug use. Family History  family history includes Anemia in her father; Breast Cancer in her paternal grandmother; Diabetes in her maternal cousin; Heart Disease in her sister; [place] in her paternal grandfather; No Known Problems in her mother; Other in her paternal aunt.     Review of Systems:  CONSTITUTIONAL:  negative for fevers, chills, fatigue and malaise    EYES:  negative for double vision, blurred vision and photophobia     HEENT:  negative for tinnitus, epistaxis and sore throat    RESPIRATORY:  negative for cough, shortness of breath, wheezing    CARDIOVASCULAR:  negative for chest pain, palpitations, syncope, edema GASTROINTESTINAL:  negative for nausea, vomiting    GENITOURINARY:  negative for incontinence    MUSCULOSKELETAL:  negative for neck or back pain    NEUROLOGICAL:  Negative for weakness and tingling  negative for headaches and dizziness    PSYCHIATRIC:  negative for anxiety      Review of systems otherwise negative. OBJECTIVE:     Vitals:    21 0718   BP: 128/69   Pulse: 101   Resp: 18   Temp: 99.2 °F (37.3 °C)   SpO2: 99%        General:  Gen: obese habitus, NAD  HEENT: NCAT, mucosa moist  Cvs: RRR, S1 S2 normal  Resp: symmetric unlabored breathing  Abd: s/nd/nt  Ext: no edema  Skin: no lesions seen, warm and dry. R groin no hematoma     Neuro:  Gen: awake and alert, oriented x3. Lang/speech: no aphasia or dysarthria. Follows commands. CN: PERRL, L eye exotropia, chronic. EOMI, VFF, V1-3 intact, face symmetric, hearing intact, shoulder shrug symmetric, tongue midline  Motor: grossly 5/5 UE and LE b/l  Sense: LT intact in all 4 ext. Coord: FTN and HTS intact b/l  DTR: deferred  Gait: deferred     NIH Stroke Scale:   1a  Level of consciousness: 0 - alert; keenly responsive   1b. LOC questions:  0 - answers both questions correctly   1c. LOC commands: 0 - performs both tasks correctly   2. Best Gaze: 0 - normal   3. Visual: 0 - no visual loss   4. Facial Palsy: 0 - normal symmetric movement   5a. Motor left arm: 0 - no drift, limb holds 90 (or 45) degrees for full 10 seconds   5b. Motor right arm: 0 - no drift, limb holds 90 (or 45) degrees for full 10 seconds   6a. Motor left le - no drift; leg holds 30 degree position for full 5 seconds   6b  Motor right le - no drift; leg holds 30 degree position for full 5 seconds   7. Limb Ataxia: 0 - absent   8. Sensory: 0 - normal; no sensory loss   9. Best Language:  0 - no aphasia, normal   10. Dysarthria: 0 - normal   11. Extinction and Inattention: 0 - no abnormality             Total:   0      MRS: 0        LABS:   Reviewed.   Lab Results Component Value Date    HGB 12.7 06/14/2021    WBC 9.5 06/14/2021     06/14/2021     01/05/2021    BUN 14 01/05/2021    CREATININE 0.57 06/24/2021    AST 17 12/20/2015    ALT 11 12/20/2015    APTT 22.8 12/18/2020    INR 1.1 12/15/2020      Lab Results   Component Value Date    COVID19 Not Detected 12/16/2020       RADIOLOGY:   Images were personally reviewed including:  CT head wo con 5/9/2021  No acute intracranial abnormality. MRA head w and wo con 2/9/2021  Stent in the supraclinoid region of the left internal carotid artery that   appears patent.       Otherwise unremarkable MRA of the brain. DSA 12/17/2021  --tortuous and dysplastic left communicating ICA   --two dissecting left communicating ICA aneurysms: dimensions L2.88mm, H1mm, W2.2mm, neck 2.88mm and dimensions L2.93mm, H2mm, W2.62mm, neck 2.93mm   --the above treated with DocsInk evolve 4x25mm flow diverter stent   --jailing of the left anterior cerebral artery with the FD stent, left anterior cerebral artery with patent flow. ASSESSMENT:   16yo female with pmh of L exotropia, obesity, depression, asthma, migraine, L communicating ICA dissecting aneurysms (with hx of trauma) x2 S/p surpass evolve FD 12/17/2020 with jailed L REGINA, patent. Pt presents today for f/u dsa to eval treatment. Risks and benefits discussed, risks include but are not limited to bruising, stroke, subarachnoid hemorrhage, death, retroperitoneal hematoma, pseudoaneurysm, lower extremity/renal/peripheral vascular compromise, informed consent obtained from pt. PLAN:   --dx dsa today  --continue asa 80    Case discussed with Dr. Pinto Erm attending.     Nalini Avila MD, PhD  Stroke, [facility]  [facility]  Electronically signed 6/24/2021 at 7:47 AM

## 2021-06-24 NOTE — BRIEF OP NOTE
[facility]    NEUROENDOVASCULAR SERVICE: POST-OP NOTE: 6/24/2021    Pt Name: Benedict Schuler  MRN: [id number]  YOB: 2000  Date of Procedure: 6/24/2021  Primary Care Physician: Kilo Akers PA-C      Pre-Procedural Diagnosis: left supraclinoid aneurysms x2 s/p prior FD, jailed left REGINA  Post-Procedural Diagnosis: as above      Procedure Performed: cerebral angiogram    Surgeon:   Cameron Bryson MD    Fellow:  Jairon Yeung MD, PhD    1st Assistant:  Na Ahn    PRE-PROCEDURAL EXAM:  Prestroke baseline mRS MODIFIED JEFF SCORE: 0 - No symptoms at all. Neurological exam performed and unchanged from initial H&P or consult     Anesthesia: IV Moderate Sedation  Complications: none    Intra-Operative EXAM:  Neurological exam performed and unchanged from initial H&P or consult    EBL: < less than 50       Cc            Specimens: Were not Obtained  Contrast:     Visipaque 270 low osmolar 36 Cc             Fluoro: 6.4 min    Findings:  Please see dictated Radiology note for further details  --previously placed left communicating ICA FD, with complete obliteration of both aneurysms, jonathan I.  --in stent stenosis, approximately 40% by NASCET criteria  --apparent interval occlusion of the left REGINA A1 segment       Jonathan score: class I    POST-PROCEDURAL EXAM :   Stable neurological Exam  Neurological exam performed and unchanged from initial H&P or consult    Closure:  right [place] 5   F        POST-PROCEDURAL MONITORING : see orders  Disposition: Recovery room      Recommendations:  --Back to [facility]  --Do not bend right leg for 3 hours. --Groin checks per protocol. --Peripheral pulse checks per protocol. --SBP goal 100-140  --Follow up with Dr. Natalya Bruce 2 weeks after discharge and Dr. Allison Jiménez 6 months after discharge.     Jairon Yeung MD PhD fellow    Cameron Bryson MD   Pager: [phone]  Stroke, [facility]  [facility]

## 2021-06-24 NOTE — PROGRESS NOTES
Patient admitted, consent signed and questions answered. Patient ready for procedure. Call light to reach with side rails up 2 of 2. .  family at bedside with patient. History and physical needed.

## 2021-06-24 NOTE — PROGRESS NOTES
Patient  Returned to room. Post angiogram recovery initiated. Patient awake and alert, denies any complaints. Right groin with safeguard intact. No hematoma or drainage noted. Side rails up times two, call light with in reach. Family at bedside. Neuro assessment unchanged from before and is negative.

## 2021-07-12 ENCOUNTER — TELEPHONE (OUTPATIENT)
Dept: NEUROSURGERY | Age: 21
End: 2021-07-12

## 2021-07-12 NOTE — TELEPHONE ENCOUNTER
Pt states that the Micaela Andreamamie is not working for her migraines. She is requesting another medication.

## 2021-07-13 ENCOUNTER — TELEPHONE (OUTPATIENT)
Dept: NEUROLOGY | Age: 21
End: 2021-07-13

## 2021-07-13 DIAGNOSIS — G43.109 MIGRAINE WITH AURA AND WITHOUT STATUS MIGRAINOSUS, NOT INTRACTABLE: ICD-10-CM

## 2021-07-13 NOTE — TELEPHONE ENCOUNTER
Left message  For patient. Will consider increasing the dose of ubrelvy if no improvement in headaches.

## 2021-07-21 RX ORDER — UBROGEPANT 50 MG/1
100 TABLET ORAL 2 TIMES DAILY PRN
Qty: 30 TABLET | Refills: 0 | Status: SHIPPED | OUTPATIENT
Start: 2021-07-21 | End: 2021-11-17 | Stop reason: ALTCHOICE

## 2021-07-26 ENCOUNTER — PATIENT MESSAGE (OUTPATIENT)
Dept: NEUROLOGY | Age: 21
End: 2021-07-26

## 2021-07-26 DIAGNOSIS — R51.9 CHRONIC INTRACTABLE HEADACHE, UNSPECIFIED HEADACHE TYPE: ICD-10-CM

## 2021-07-26 DIAGNOSIS — R51.9 ACUTE INTRACTABLE HEADACHE, UNSPECIFIED HEADACHE TYPE: Primary | ICD-10-CM

## 2021-07-26 DIAGNOSIS — G89.29 CHRONIC INTRACTABLE HEADACHE, UNSPECIFIED HEADACHE TYPE: ICD-10-CM

## 2021-07-27 ENCOUNTER — TELEPHONE (OUTPATIENT)
Dept: NEUROLOGY | Age: 21
End: 2021-07-27

## 2021-07-27 DIAGNOSIS — R51.9 ACUTE INTRACTABLE HEADACHE, UNSPECIFIED HEADACHE TYPE: Primary | ICD-10-CM

## 2021-07-27 RX ORDER — PREDNISONE 10 MG/1
10 TABLET ORAL DAILY
Qty: 3 TABLET | Refills: 0 | Status: SHIPPED | OUTPATIENT
Start: 2021-08-04 | End: 2021-08-07

## 2021-07-27 RX ORDER — PREDNISONE 20 MG/1
20 TABLET ORAL DAILY
Qty: 3 TABLET | Refills: 0 | Status: SHIPPED | OUTPATIENT
Start: 2021-07-31 | End: 2021-08-03

## 2021-07-27 RX ORDER — ERENUMAB-AOOE 70 MG/ML
70 INJECTION SUBCUTANEOUS
Qty: 1 PEN | Refills: 5 | Status: SHIPPED | OUTPATIENT
Start: 2021-07-27 | End: 2021-11-17 | Stop reason: ALTCHOICE

## 2021-07-27 RX ORDER — PREDNISONE 20 MG/1
40 TABLET ORAL DAILY
Qty: 6 TABLET | Refills: 0 | Status: SHIPPED | OUTPATIENT
Start: 2021-07-27 | End: 2021-07-30

## 2021-07-27 NOTE — TELEPHONE ENCOUNTER
From: Francella Boxer  To: Diego Curry MD  Sent: 7/26/2021 7:54 PM EDT  Subject: Non-Urgent Medical Question    I just want to stress that the office you work at and your colleague Dr. Luisito Vincent are pretty much incompetent. Every time I call this office I get the \"I sent the message to your doctor\" and either they don't get back to me or they never even sent my doctor a message. This is absolutely ridiculous and you guys should care more about your patients. Dr. Luisito Vincent called me 7/12 pretty close to closing time so I wasn't able to call him back and I was at an appointment so I could not answer, I called him back and left a message 7/15 telling him that I would appreciate it if he would up the dose of the Ubrelvy per his recommendation because it has not been working even when I take 2 and I still have raging migraines 5 days a week. It is now 7/26 and I still have not heard anything back. I don't think you guys care about your patients because your not the ones going through the constant pain of having migraines and not being able to do anything about it and not be able to live your life. I will   be switching doctors hopefully to one who is more competent, cares about their patients and actually gets back to them within 72 hrs like your office claims they are supposed to do which they don't. This office should be shut down or be reviewed so this issue is addressed and other people nor I have to keep going through this nightmare.

## 2021-07-27 NOTE — TELEPHONE ENCOUNTER
Spoke with patient again, added aimovig and provided a steroid taper to see if acutely improves of her ongoing headaches. Unclear if she needs approval for aimovig.

## 2021-08-03 ENCOUNTER — PATIENT MESSAGE (OUTPATIENT)
Dept: FAMILY MEDICINE CLINIC | Age: 21
End: 2021-08-03

## 2021-08-03 ENCOUNTER — OFFICE VISIT (OUTPATIENT)
Dept: FAMILY MEDICINE CLINIC | Age: 21
End: 2021-08-03
Payer: MEDICARE

## 2021-08-03 VITALS
OXYGEN SATURATION: 98 % | DIASTOLIC BLOOD PRESSURE: 64 MMHG | WEIGHT: 202 LBS | TEMPERATURE: 98.6 F | HEART RATE: 58 BPM | HEIGHT: 62 IN | BODY MASS INDEX: 37.17 KG/M2 | SYSTOLIC BLOOD PRESSURE: 102 MMHG

## 2021-08-03 DIAGNOSIS — R23.2 HOT FLASHES: Primary | ICD-10-CM

## 2021-08-03 DIAGNOSIS — G47.00 INSOMNIA, UNSPECIFIED TYPE: Primary | ICD-10-CM

## 2021-08-03 PROCEDURE — 99213 OFFICE O/P EST LOW 20 MIN: CPT | Performed by: PHYSICIAN ASSISTANT

## 2021-08-03 PROCEDURE — 4004F PT TOBACCO SCREEN RCVD TLK: CPT | Performed by: PHYSICIAN ASSISTANT

## 2021-08-03 PROCEDURE — G8417 CALC BMI ABV UP PARAM F/U: HCPCS | Performed by: PHYSICIAN ASSISTANT

## 2021-08-03 PROCEDURE — G8427 DOCREV CUR MEDS BY ELIG CLIN: HCPCS | Performed by: PHYSICIAN ASSISTANT

## 2021-08-03 RX ORDER — RAMELTEON 8 MG/1
8 TABLET ORAL NIGHTLY PRN
Qty: 30 TABLET | Refills: 1 | Status: SHIPPED | OUTPATIENT
Start: 2021-08-03 | End: 2021-11-17 | Stop reason: ALTCHOICE

## 2021-08-03 RX ORDER — BUPROPION HYDROCHLORIDE 100 MG/1
TABLET, EXTENDED RELEASE ORAL
COMMUNITY
Start: 2021-08-01 | End: 2021-11-08 | Stop reason: SDUPTHER

## 2021-08-03 ASSESSMENT — ENCOUNTER SYMPTOMS
COUGH: 0
WHEEZING: 0
SHORTNESS OF BREATH: 0

## 2021-08-03 NOTE — PROGRESS NOTES
[facility]  [address] [address]  Dept: [phone]  Dept Fax: [phone]    Yamileth Delaney is a 23 y.o. female who presents today for her medical conditions/complaintsas noted below. Yamileth Delaney is c/o of   Chief Complaint   Patient presents with    Insomnia     follow up          HPI:     HPI    Patient here for follow up with her insomnia. She states having daily migraines she is working through treating with her neurologist. They are trying to get her aimovig covered. She states the headaches are mainly seeming to be the cause of her sleep. She states has tried [place] in the past and did not help. She used rozerem and that did work but insurance did not cover well      Hemoglobin A1C (%)   Date Value   12/16/2020 5.0   11/06/2020 4.9   01/03/2019 5.0             ( goal A1Cis < 7)   No results found for: LABMICR  No results found for: LDLCHOLESTEROL, LDLCALC    (goal LDL is <100)   AST (U/L)   Date Value   12/20/2015 17     ALT (U/L)   Date Value   12/20/2015 11     BUN (mg/dL)   Date Value   01/05/2021 14     BP Readings from Last 3 Encounters:   08/03/21 102/64   06/24/21 116/64   06/17/21 118/70          (goal 120/80)    Past Medical History:   Diagnosis Date    Aneurysm of internal carotid artery     Asthma     Carotid aneurysm, left (HCC)     Headache     History of angiography 06/24/2021    CEREBRAL ANGIOGRAM    Moderate episode of recurrent major depressive disorder ([address] 1/7/2019      Past Surgical History:   Procedure Laterality Date    CARDIAC CATHETERIZATION  12/17/2020    no stent here in [facility].     EYE SURGERY      lazy eye    OTHER SURGICAL HISTORY  12/16/2020    cerebral angiogram, stent placed       Family History   Problem Relation Age of Onset    Diabetes Maternal Cousin     No Known Problems Mother     Anemia Father     Breast Cancer Paternal Grandmother     Lung Cancer Paternal Grandfather     Heart Disease Sister     Other Paternal Aunt         cerebral aneurysm       Social History     Tobacco Use    Smoking status: Current Some Day Smoker     Packs/day: 0.25     Types: Cigarettes    Smokeless tobacco: Never Used   Substance Use Topics    Alcohol use: Not Currently      Current Outpatient Medications   Medication Sig Dispense Refill    buPROPion (WELLBUTRIN SR) 100 MG extended release tablet       ramelteon (ROZEREM) 8 MG tablet Take 1 tablet by mouth nightly as needed for Sleep 30 tablet 1    [START ON 8/4/2021] predniSONE (DELTASONE) 10 MG tablet Take 1 tablet by mouth daily for 3 days 3 tablet 0    magnesium oxide (MAG-OX) 400 (241.3 Mg) MG TABS tablet TAKE ONE TABLET BY MOUTH DAILY FOR MIGRAINE 30 tablet 5    Ubrogepant (UBRELVY) 50 MG TABS Take 100 mg by mouth 2 times daily as needed (migraines) Can repeat if not effective after 2 hours, no more than 2 times in one day and 4 times in one week 30 tablet 0    sertraline (ZOLOFT) 50 MG tablet Take 50 mg by mouth daily       vitamin B-12 (CYANOCOBALAMIN) 1000 MCG tablet Take 1 tablet by mouth daily 30 tablet 3    levonorgestrel-ethinyl estradiol (SEASONALE) 0.15-0.03 MG per tablet Take 1 tablet by mouth daily 1 packet 3    gabapentin (NEURONTIN) 300 MG capsule Take 300 mg by mouth. Daily as needed. Rare use      aspirin EC 81 MG EC tablet Take 1 tablet by mouth daily 90 tablet 5    montelukast (SINGULAIR) 10 MG tablet Take 10 mg by mouth nightly      loratadine (CLARITIN) 10 MG tablet Take 10 mg by mouth nightly      fluticasone (FLOVENT HFA) 110 MCG/ACT inhaler Inhale 2 puffs into the lungs 2 times daily 1 Inhaler 3    Erenumab-aooe (AIMOVIG) 70 MG/ML SOAJ Inject 70 Units into the skin every 30 days (Patient not taking: Reported on 8/3/2021) 1 pen 5     No current facility-administered medications for this visit.      Allergies   Allergen Reactions    Latex        Health Maintenance   Topic Date Due    Hepatitis C Judgment: Judgment normal.       /64 (Site: Left Upper Arm, Position: Sitting, Cuff Size: Large Adult)   Pulse 58   Temp 98.6 °F (37 °C) (Tympanic)   Ht 5' 2\" (1.575 m)   Wt 202 lb (91.6 kg)   SpO2 98%   BMI 36.95 kg/m²     Assessment:       Diagnosis Orders   1. Insomnia, unspecified type  ramelteon (ROZEREM) 8 MG tablet             Plan:      Return if symptoms worsen or fail to improve. Looks likely that rozerem is covered again. rx to pharmacy. Encouraged healthy sleep routine. Discussed consistent bed time and wake time to help establish consistency  Recommended move claritin to AM  Avoid night time stressors and caffeine  Patient agreed with treatment plan    Orders Placed This Encounter   Medications    ramelteon (ROZEREM) 8 MG tablet     Sig: Take 1 tablet by mouth nightly as needed for Sleep     Dispense:  30 tablet     Refill:  1       Patient given educational materials - see patient instructions. Discussed use, benefit, and side effects of prescribed medications. All patientquestions answered. Pt voiced understanding. Reviewed health maintenance. Instructedto continue current medications, diet and exercise. Patient agreed with treatmentplan. Follow up as directed.      Electronically signed by Amarilys Couch PA-C on 8/3/2021 at 3:17 PM

## 2021-08-03 NOTE — PROGRESS NOTES
Visit Information    Have you changed or started any medications since your last visit including any over-the-counter medicines, vitamins, or herbal medicines? no   Are you having any side effects from any of your medications? -  no  Have you stopped taking any of your medications? Is so, why? -  no    Have you seen any other physician or provider since your last visit? No  Have you had any other diagnostic tests since your last visit? No  Have you been seen in the emergency room and/or had an admission to a hospital since we last saw you? No  Have you had your routine dental cleaning in the past 6 months? no    Have you activated your [employer] account? If not, what are your barriers?  Yes     Patient Care Team:  Millie Nicole PA-C as PCP - General (Physician Assistant)  Millie Nicole PA-C as PCP - [facility]  LETY Bearden CNP as Nurse Practitioner (Family Nurse Practitioner)    Medical History Review  Past Medical, Family, and Social History reviewed and  contribute to the patient presenting condition    Health Maintenance   Topic Date Due    Hepatitis C screen  Never done    Pneumococcal 0-64 years Vaccine (1 of 2 - PPSV23) Never done    COVID-19 Vaccine (1) Never done    HIV screen  Never done    HPV vaccine (2 - 3-dose series) 09/28/2015    Hepatitis A vaccine (2 of 2 - 2-dose series) 02/29/2016    Chlamydia screen  12/08/2019    Flu vaccine (1) 09/01/2021    DTaP/Tdap/Td vaccine (7 - Td or Tdap) 08/29/2022    Cervical cancer screen  12/08/2023    Hepatitis B vaccine  Completed    Varicella vaccine  Completed    Meningococcal (ACWY) vaccine  Completed    Hib vaccine  Aged Out

## 2021-08-10 ENCOUNTER — TELEPHONE (OUTPATIENT)
Dept: FAMILY MEDICINE CLINIC | Age: 21
End: 2021-08-10

## 2021-08-10 ENCOUNTER — TELEPHONE (OUTPATIENT)
Dept: NEUROSURGERY | Age: 21
End: 2021-08-10

## 2021-08-10 NOTE — TELEPHONE ENCOUNTER
Patients insurance will not cover the Ramelteon medication- Deniz Serra said have patient try [facility] - I printed off a coupon code for patient -left message  for patient to call us and we can send over a script to [facility] - that has the lowest price   If that is the route she wants to take

## 2021-09-10 ENCOUNTER — PATIENT MESSAGE (OUTPATIENT)
Dept: OBGYN CLINIC | Age: 21
End: 2021-09-10

## 2021-09-10 NOTE — TELEPHONE ENCOUNTER
From: Erika Rollins  To: LETY Mandujano CNP  Sent: 9/10/2021 11:54 AM EDT  Subject: Prescription Question    Danny Lawson, I was wondering if there was any way to up the dosage on my birth control or if there is another 3-month birth control? I have been having issues with bleeding for a couple weeks stopping for a couple days and then it starts again and I don't know why it's happening because I take my pill everyday at the same time.

## 2021-09-28 ENCOUNTER — HOSPITAL ENCOUNTER (OUTPATIENT)
Age: 21
Discharge: HOME | End: 2021-09-28
Payer: MEDICARE

## 2021-09-28 DIAGNOSIS — R23.2 HOT FLASHES: ICD-10-CM

## 2021-09-28 LAB — TSH SERPL DL<=0.05 MIU/L-ACNC: 1.54 MIU/L (ref 0.3–5)

## 2021-09-28 PROCEDURE — 84443 ASSAY THYROID STIM HORMONE: CPT

## 2021-09-28 PROCEDURE — 36415 COLL VENOUS BLD VENIPUNCTURE: CPT

## 2021-09-29 ENCOUNTER — APPOINTMENT (OUTPATIENT)
Dept: CT IMAGING | Age: 21
End: 2021-09-29
Payer: MEDICARE

## 2021-09-29 ENCOUNTER — HOSPITAL ENCOUNTER (EMERGENCY)
Age: 21
Discharge: HOME | End: 2021-09-30
Attending: EMERGENCY MEDICINE
Payer: MEDICARE

## 2021-09-29 VITALS
OXYGEN SATURATION: 98 % | DIASTOLIC BLOOD PRESSURE: 86 MMHG | HEIGHT: 62 IN | HEART RATE: 105 BPM | WEIGHT: 190 LBS | SYSTOLIC BLOOD PRESSURE: 129 MMHG | TEMPERATURE: 98.6 F | BODY MASS INDEX: 34.96 KG/M2 | RESPIRATION RATE: 16 BRPM

## 2021-09-29 DIAGNOSIS — R51.9 ACUTE NONINTRACTABLE HEADACHE, UNSPECIFIED HEADACHE TYPE: Primary | ICD-10-CM

## 2021-09-29 LAB
ABSOLUTE EOS #: 0.1 K/UL (ref 0–0.44)
ABSOLUTE IMMATURE GRANULOCYTE: 0.02 K/UL (ref 0–0.3)
ABSOLUTE LYMPH #: 2.73 K/UL (ref 1.1–3.7)
ABSOLUTE MONO #: 0.4 K/UL (ref 0.1–1.2)
ANION GAP SERPL CALCULATED.3IONS-SCNC: 12 MMOL/L (ref 9–17)
BASOPHILS # BLD: 0 % (ref 0–2)
BASOPHILS ABSOLUTE: 0.03 K/UL (ref 0–0.2)
BUN BLDV-MCNC: 7 MG/DL (ref 6–20)
BUN/CREAT BLD: 10 (ref 9–20)
CALCIUM SERPL-MCNC: 8.9 MG/DL (ref 8.6–10.4)
CHLORIDE BLD-SCNC: 104 MMOL/L (ref 98–107)
CO2: 22 MMOL/L (ref 20–31)
CREAT SERPL-MCNC: 0.73 MG/DL (ref 0.5–0.9)
DIFFERENTIAL TYPE: NORMAL
EOSINOPHILS RELATIVE PERCENT: 1 % (ref 1–4)
GFR AFRICAN AMERICAN: >60 ML/MIN
GFR NON-AFRICAN AMERICAN: >60 ML/MIN
GFR SERPL CREATININE-BSD FRML MDRD: ABNORMAL ML/MIN/{1.73_M2}
GFR SERPL CREATININE-BSD FRML MDRD: ABNORMAL ML/MIN/{1.73_M2}
GLUCOSE BLD-MCNC: 108 MG/DL (ref 70–99)
HCT VFR BLD CALC: 37.9 % (ref 36.3–47.1)
HEMOGLOBIN: 12.1 G/DL (ref 11.9–15.1)
IMMATURE GRANULOCYTES: 0 %
LYMPHOCYTES # BLD: 37 % (ref 24–43)
MCH RBC QN AUTO: 27.4 PG (ref 25.2–33.5)
MCHC RBC AUTO-ENTMCNC: 31.9 G/DL (ref 28.4–34.8)
MCV RBC AUTO: 85.7 FL (ref 82.6–102.9)
MONOCYTES # BLD: 5 % (ref 3–12)
NRBC AUTOMATED: 0 PER 100 WBC
PDW BLD-RTO: 13.3 % (ref 11.8–14.4)
PLATELET # BLD: 259 K/UL (ref 138–453)
PLATELET ESTIMATE: NORMAL
PMV BLD AUTO: 12 FL (ref 8.1–13.5)
POTASSIUM SERPL-SCNC: 3.8 MMOL/L (ref 3.7–5.3)
RBC # BLD: 4.42 M/UL (ref 3.95–5.11)
RBC # BLD: NORMAL 10*6/UL
SEG NEUTROPHILS: 57 % (ref 36–65)
SEGMENTED NEUTROPHILS ABSOLUTE COUNT: 4.08 K/UL (ref 1.5–8.1)
SODIUM BLD-SCNC: 138 MMOL/L (ref 135–144)
WBC # BLD: 7.4 K/UL (ref 3.5–11.3)
WBC # BLD: NORMAL 10*3/UL

## 2021-09-29 PROCEDURE — 96375 TX/PRO/DX INJ NEW DRUG ADDON: CPT

## 2021-09-29 PROCEDURE — 6360000002 HC RX W HCPCS: Performed by: EMERGENCY MEDICINE

## 2021-09-29 PROCEDURE — 85025 COMPLETE CBC W/AUTO DIFF WBC: CPT

## 2021-09-29 PROCEDURE — 99283 EMERGENCY DEPT VISIT LOW MDM: CPT

## 2021-09-29 PROCEDURE — 6360000004 HC RX CONTRAST MEDICATION: Performed by: EMERGENCY MEDICINE

## 2021-09-29 PROCEDURE — 80048 BASIC METABOLIC PNL TOTAL CA: CPT

## 2021-09-29 PROCEDURE — 70450 CT HEAD/BRAIN W/O DYE: CPT

## 2021-09-29 PROCEDURE — 96374 THER/PROPH/DIAG INJ IV PUSH: CPT

## 2021-09-29 PROCEDURE — 2580000003 HC RX 258: Performed by: EMERGENCY MEDICINE

## 2021-09-29 PROCEDURE — 70498 CT ANGIOGRAPHY NECK: CPT

## 2021-09-29 RX ORDER — SODIUM CHLORIDE 0.9 % (FLUSH) 0.9 %
10 SYRINGE (ML) INJECTION PRN
Status: DISCONTINUED | OUTPATIENT
Start: 2021-09-29 | End: 2021-09-30 | Stop reason: HOSPADM

## 2021-09-29 RX ORDER — 0.9 % SODIUM CHLORIDE 0.9 %
1000 INTRAVENOUS SOLUTION INTRAVENOUS ONCE
Status: COMPLETED | OUTPATIENT
Start: 2021-09-29 | End: 2021-09-30

## 2021-09-29 RX ORDER — METOCLOPRAMIDE HYDROCHLORIDE 5 MG/ML
10 INJECTION INTRAMUSCULAR; INTRAVENOUS ONCE
Status: COMPLETED | OUTPATIENT
Start: 2021-09-29 | End: 2021-09-29

## 2021-09-29 RX ORDER — ONDANSETRON 2 MG/ML
4 INJECTION INTRAMUSCULAR; INTRAVENOUS ONCE
Status: DISCONTINUED | OUTPATIENT
Start: 2021-09-29 | End: 2021-09-30 | Stop reason: HOSPADM

## 2021-09-29 RX ORDER — DIPHENHYDRAMINE HYDROCHLORIDE 50 MG/ML
25 INJECTION INTRAMUSCULAR; INTRAVENOUS ONCE
Status: COMPLETED | OUTPATIENT
Start: 2021-09-29 | End: 2021-09-29

## 2021-09-29 RX ORDER — 0.9 % SODIUM CHLORIDE 0.9 %
80 INTRAVENOUS SOLUTION INTRAVENOUS ONCE
Status: COMPLETED | OUTPATIENT
Start: 2021-09-29 | End: 2021-09-29

## 2021-09-29 RX ADMIN — SODIUM CHLORIDE 80 ML: 9 INJECTION, SOLUTION INTRAVENOUS at 23:45

## 2021-09-29 RX ADMIN — METOCLOPRAMIDE HYDROCHLORIDE 10 MG: 5 INJECTION INTRAMUSCULAR; INTRAVENOUS at 23:03

## 2021-09-29 RX ADMIN — IOPAMIDOL 75 ML: 755 INJECTION, SOLUTION INTRAVENOUS at 23:45

## 2021-09-29 RX ADMIN — DIPHENHYDRAMINE HYDROCHLORIDE 25 MG: 50 INJECTION, SOLUTION INTRAMUSCULAR; INTRAVENOUS at 23:04

## 2021-09-29 RX ADMIN — SODIUM CHLORIDE, PRESERVATIVE FREE 10 ML: 5 INJECTION INTRAVENOUS at 23:45

## 2021-09-29 RX ADMIN — SODIUM CHLORIDE 1000 ML: 9 INJECTION, SOLUTION INTRAVENOUS at 23:04

## 2021-09-29 ASSESSMENT — PAIN SCALES - GENERAL: PAINLEVEL_OUTOF10: 10

## 2021-09-29 ASSESSMENT — PAIN DESCRIPTION - DESCRIPTORS: DESCRIPTORS: SQUEEZING

## 2021-09-29 ASSESSMENT — PAIN DESCRIPTION - PAIN TYPE: TYPE: ACUTE PAIN

## 2021-09-29 ASSESSMENT — PAIN DESCRIPTION - LOCATION: LOCATION: HEAD

## 2021-09-29 ASSESSMENT — PAIN DESCRIPTION - ORIENTATION: ORIENTATION: LEFT

## 2021-09-30 ENCOUNTER — PATIENT MESSAGE (OUTPATIENT)
Dept: FAMILY MEDICINE CLINIC | Age: 21
End: 2021-09-30

## 2021-09-30 ASSESSMENT — ENCOUNTER SYMPTOMS
COLOR CHANGE: 0
COUGH: 0
SHORTNESS OF BREATH: 0
PHOTOPHOBIA: 1
NAUSEA: 0
SORE THROAT: 0
RHINORRHEA: 0
EYE REDNESS: 0
EYE DISCHARGE: 0
DIARRHEA: 0
VOMITING: 0

## 2021-09-30 NOTE — ED PROVIDER NOTES
EMERGENCY DEPARTMENT ENCOUNTER    Pt Name: Luis Kirkland  MRN: [id number]  [place] 2000  Date of evaluation: 9/29/21  CHIEF COMPLAINT       Chief Complaint   Patient presents with    Migraine     hx aneurysms. left sided squeezing pain. unlike other headaches she has had previously. HISTORY OF PRESENT ILLNESS   This is a 74-year-old female with a history of previous aneurysms that presents with complaints of a headache. Patient states that she has a strange headache unlike other headaches that she has had previously. She describes a pain that is dull and achy behind her left eye but before her left ear. She states it feels like it is inside of her brain. Its been ongoing for 2 to 3 days, she denies really any other significant symptoms though she does state that she has had some intermittent episodes of light sensitivity. She denies nausea or vomiting no fevers or chills, no cough or sputum production. No numbness tingling or weakness, no speech changes, no chest pain or shortness of breath. REVIEW OF SYSTEMS     Review of Systems   Constitutional: Negative for chills and fever. HENT: Negative for rhinorrhea and sore throat. Eyes: Positive for photophobia. Negative for discharge, redness and visual disturbance. Respiratory: Negative for cough and shortness of breath. Cardiovascular: Negative for chest pain, palpitations and leg swelling. Gastrointestinal: Negative for diarrhea, nausea and vomiting. Musculoskeletal: Negative for arthralgias, myalgias and neck pain. Skin: Negative for color change and rash. Neurological: Positive for headaches. Negative for seizures and weakness. Psychiatric/Behavioral: Negative for hallucinations, self-injury and suicidal ideas.      PASTMEDICAL HISTORY     Past Medical History:   Diagnosis Date    Aneurysm of internal carotid artery     Asthma     Carotid aneurysm, left (HCC)     Headache     History of angiography 50 MG tablet Take 50 mg by mouth daily       vitamin B-12 (CYANOCOBALAMIN) 1000 MCG tablet Take 1 tablet by mouth daily  Qty: 30 tablet, Refills: 3    Associated Diagnoses: B12 deficiency      levonorgestrel-ethinyl estradiol (SEASONALE) 0.15-0.03 MG per tablet Take 1 tablet by mouth daily  Qty: 1 packet, Refills: 3      gabapentin (NEURONTIN) 300 MG capsule Take 300 mg by mouth. Daily as needed. Rare use      aspirin EC 81 MG EC tablet Take 1 tablet by mouth daily  Qty: 90 tablet, Refills: 5      montelukast (SINGULAIR) 10 MG tablet Take 10 mg by mouth nightly      loratadine (CLARITIN) 10 MG tablet Take 10 mg by mouth nightly      fluticasone (FLOVENT HFA) 110 MCG/ACT inhaler Inhale 2 puffs into the lungs 2 times daily  Qty: 1 Inhaler, Refills: 3    Associated Diagnoses: Moderate persistent asthma without complication           ALLERGIES     is allergic to latex. FAMILY HISTORY     She indicated that her mother is alive. She indicated that her father is alive. She indicated that her sister is alive. She indicated that her paternal grandmother is . She indicated that her paternal grandfather is . She indicated that her paternal aunt is . She indicated that her maternal cousin is alive. SOCIAL HISTORY       Social History     Tobacco Use    Smoking status: Current Some Day Smoker     Packs/day: 0.25     Types: Cigarettes    Smokeless tobacco: Never Used   Vaping Use    Vaping Use: Every day   Substance Use Topics    Alcohol use: Not Currently    Drug use: No     PHYSICAL EXAM     INITIAL VITALS: /86   Pulse 105   Temp 98.6 °F (37 °C) (Oral)   Resp 16   Ht 5' 2\" (1.575 m)   Wt 190 lb (86.2 kg)   SpO2 98%   BMI 34.75 kg/m²    Physical Exam  Constitutional:       Appearance: Normal appearance. She is well-developed. She is not ill-appearing or toxic-appearing. HENT:      Head: Normocephalic and atraumatic.    Eyes:      Conjunctiva/sclera: Conjunctivae normal. Pupils: Pupils are equal, round, and reactive to light. Neck:      Trachea: Trachea normal.   Cardiovascular:      Rate and Rhythm: Normal rate and regular rhythm. Heart sounds: S1 normal and S2 normal. No murmur heard. Pulmonary:      Effort: Pulmonary effort is normal. No accessory muscle usage or respiratory distress. Breath sounds: Normal breath sounds. Chest:      Chest wall: No deformity or tenderness. Abdominal:      General: Bowel sounds are normal. There is no distension or abdominal bruit. Palpations: Abdomen is not rigid. Tenderness: There is no abdominal tenderness. There is no guarding or rebound. Musculoskeletal:      Cervical back: Normal range of motion and neck supple. Skin:     General: Skin is warm. Findings: No rash. Neurological:      Mental Status: She is alert and oriented to person, place, and time. GCS: GCS eye subscore is 4. GCS verbal subscore is 5. GCS motor subscore is 6. Cranial Nerves: Cranial nerves are intact. Sensory: Sensation is intact. Motor: Motor function is intact. Coordination: Coordination is intact. Psychiatric:         Speech: Speech normal.         MEDICAL DECISION MAKIN-year-old female, history of headaches but this feels different than previous, she is neurologically intact. Plan is CT, patient    1:35 AM EDT  Patient CT scans negative, patient is feeling better, plan is discharged with outpatient follow-up. CRITICAL CARE:       PROCEDURES:    Procedures    DIAGNOSTIC RESULTS   EKG:All EKG's are interpreted by the Emergency Department Physician who either signs or Co-signs this chart in the absence of a cardiologist.        RADIOLOGY:All plain film, CT, MRI, and formal ultrasound images (except ED bedside ultrasound) are read by the radiologist, see reports below, unless otherwisenoted in MDM or here. CT HEAD WO CONTRAST   Final Result   Normal CT of the brain.          CTA HEAD NECK W CONTRAST   Final Result   Covered stent within the distal left ICA, without evidence of aneurysmal   remnant. No large vessel occlusion detected within the head or neck. LABS: All lab results were reviewed by myself, and all abnormals are listed below. Labs Reviewed   BASIC METABOLIC PANEL - Abnormal; Notable for the following components:       Result Value    Glucose 108 (*)     All other components within normal limits   CBC WITH AUTO DIFFERENTIAL       EMERGENCY DEPARTMENTCOURSE:         Vitals:    Vitals:    09/29/21 2006 09/29/21 2009   BP: 129/86    Pulse: 105    Resp: 16    Temp:  98.6 °F (37 °C)   TempSrc: Oral Oral   SpO2: 98%    Weight: 190 lb (86.2 kg)    Height: 5' 2\" (1.575 m)        The patient was given the following medications while in the emergency department:  Orders Placed This Encounter   Medications    ondansetron (ZOFRAN) injection 4 mg    0.9 % sodium chloride bolus    metoclopramide (REGLAN) injection 10 mg    diphenhydrAMINE (BENADRYL) injection 25 mg    0.9 % sodium chloride bolus    iopamidol (ISOVUE-370) 76 % injection 75 mL    sodium chloride flush 0.9 % injection 10 mL     CONSULTS:  None    FINAL IMPRESSION      1. Acute nonintractable headache, unspecified headache type          DISPOSITION/PLAN   DISPOSITION Decision To Discharge 09/30/2021 01:11:34 AM      PATIENT REFERRED TO:  Mac Marquez PA-C  [address] [place] [address]  [phone]    Schedule an appointment as soon as possible for a visit in 2 days      DISCHARGE MEDICATIONS:  Current Discharge Medication List        Lori Street MD  Attending Emergency Physician                    Lori Street MD  09/30/21 1362

## 2021-10-01 NOTE — TELEPHONE ENCOUNTER
From: Arnoldo Zambrano  To: Nikole Stockton PA-C  Sent: 9/30/2021 8:06 PM EDT  Subject: Test Results Question    I see in my test results from my hospital visit last night that my glucose was 108 is that bad?

## 2021-10-05 ENCOUNTER — TELEPHONE (OUTPATIENT)
Dept: NEUROSURGERY | Age: 21
End: 2021-10-05

## 2021-10-05 DIAGNOSIS — G43.009 MIGRAINE WITHOUT AURA AND WITHOUT STATUS MIGRAINOSUS, NOT INTRACTABLE: Primary | ICD-10-CM

## 2021-10-05 NOTE — TELEPHONE ENCOUNTER
Discussed and recommended the ordered surgical procedure(s): Please see orders.     Surgery scheduling requirements include:  Date of surgery: OS 5/18/17  Facility: [facility]  Admission Type: Outpatient  Anesthesia: Local with MAC  Length of Appt for Procedure: 45 Minutes  A-Scan: No  Preoperative History and Physical: Obey Adams MD      SE OD -0.25 +- , goal OS pl to -1.00  IMPLANT:  PC (SN60WF) 21.0  AC (MTA3UO) 17.0      Special Equipment: Yes, DUOVISC**   Intracameral Epinephrine 1:4000   --mix 0.25 cc Epi (1:1000) with 0.75 cc BSS (or same dilution ratio with incr volume for more than 1 case)  --have on table      Additional Comment:   Postoperative visits: post-op appointment 1 day, 1 week, 3 weeks   Patient calling requesting that a referral for neurology be made to Dr. Tono Corral. Patient would like to transfer services.

## 2021-10-08 ENCOUNTER — PATIENT MESSAGE (OUTPATIENT)
Dept: FAMILY MEDICINE CLINIC | Age: 21
End: 2021-10-08

## 2021-10-09 ENCOUNTER — PATIENT MESSAGE (OUTPATIENT)
Dept: FAMILY MEDICINE CLINIC | Age: 21
End: 2021-10-09

## 2021-10-09 NOTE — TELEPHONE ENCOUNTER
From: Dairl Showers  To: Rubén Alexander PA-C  Sent: 10/8/2021 10:21 PM EDT  Subject: Non-Urgent Medical Question    Since my neurologist is incompetent I was wondering if maybe you would be able to answer this for me. I was wondering if I would be able to get a daith piercing? My aneurysm is in the internal carotid artery. The common carotid artery splits off to the right into the external carotid artery which is the artery that supplies blood to the face, neck and ears. The internal carotid artery splits off from the common carotid artery to the left which is the artery that supplies blood to the brain. If you don't know maybe you could do some research for me and let me know what you find.  Thank you

## 2021-10-09 NOTE — TELEPHONE ENCOUNTER
From: Bernadette Murillo  To: Shona Alfred PA-C  Sent: 10/9/2021 12:11 PM EDT  Subject: Prescription Question    Is there another sleep medication you can prescribe me? The Ramelteon helps tremendously but it's not covered by Medicaid and I can't afford to keep paying $73 a month for it.  Thank you

## 2021-10-11 ENCOUNTER — PATIENT MESSAGE (OUTPATIENT)
Dept: FAMILY MEDICINE CLINIC | Age: 21
End: 2021-10-11

## 2021-10-11 NOTE — TELEPHONE ENCOUNTER
From: Tiara Medel  To: Pankaj Chou PA-C  Sent: 10/11/2021 3:25 PM EDT  Subject: Prescription Question    I tried melatonin last night and it didn't work at all it took me 3 hours to fall asleep and I laid down at the same time I always do.

## 2021-10-11 NOTE — TELEPHONE ENCOUNTER
From: Marielena Coelho  To: Ashley Pratt PA-C  Sent: 10/11/2021 4:14 PM EDT  Subject: Prescription Question    I will try that & yes I use the goodrx and it's still $73

## 2021-10-14 DIAGNOSIS — E53.8 B12 DEFICIENCY: ICD-10-CM

## 2021-10-14 RX ORDER — LANOLIN ALCOHOL/MO/W.PET/CERES
1000 CREAM (GRAM) TOPICAL DAILY
Qty: 30 TABLET | Refills: 3 | Status: SHIPPED | OUTPATIENT
Start: 2021-10-14 | End: 2022-02-17 | Stop reason: SDUPTHER

## 2021-11-08 ENCOUNTER — PATIENT MESSAGE (OUTPATIENT)
Dept: FAMILY MEDICINE CLINIC | Age: 21
End: 2021-11-08

## 2021-11-08 RX ORDER — BUPROPION HYDROCHLORIDE 100 MG/1
100 TABLET, EXTENDED RELEASE ORAL 2 TIMES DAILY
Qty: 60 TABLET | Refills: 3 | Status: SHIPPED | OUTPATIENT
Start: 2021-11-08 | End: 2022-03-16

## 2021-11-15 ENCOUNTER — PATIENT MESSAGE (OUTPATIENT)
Dept: FAMILY MEDICINE CLINIC | Age: 21
End: 2021-11-15

## 2021-11-15 RX ORDER — MONTELUKAST SODIUM 10 MG/1
10 TABLET ORAL NIGHTLY
Qty: 30 TABLET | Refills: 5 | Status: SHIPPED | OUTPATIENT
Start: 2021-11-15 | End: 2022-05-15

## 2021-11-15 NOTE — TELEPHONE ENCOUNTER
From: Fredy Kirk  To: Nithin Cuba  Sent: 11/15/2021 12:26 AM EST  Subject: Carlo Bonner, my previous PCP prescribed me these meds and Im now out of refills can you please send refills to my pharmacy?   Montelukast SOD 10mg once a day  Sertraline HCL 50mg once a day

## 2021-11-17 ENCOUNTER — OFFICE VISIT (OUTPATIENT)
Dept: FAMILY MEDICINE CLINIC | Age: 21
End: 2021-11-17
Payer: MEDICARE

## 2021-11-17 VITALS
BODY MASS INDEX: 38.39 KG/M2 | DIASTOLIC BLOOD PRESSURE: 62 MMHG | WEIGHT: 208.6 LBS | HEART RATE: 96 BPM | SYSTOLIC BLOOD PRESSURE: 116 MMHG | HEIGHT: 62 IN | TEMPERATURE: 98.6 F | OXYGEN SATURATION: 99 %

## 2021-11-17 DIAGNOSIS — F32.A ANXIETY AND DEPRESSION: ICD-10-CM

## 2021-11-17 DIAGNOSIS — F41.9 ANXIETY AND DEPRESSION: ICD-10-CM

## 2021-11-17 DIAGNOSIS — G47.00 INSOMNIA, UNSPECIFIED TYPE: Primary | ICD-10-CM

## 2021-11-17 PROCEDURE — G8427 DOCREV CUR MEDS BY ELIG CLIN: HCPCS | Performed by: PHYSICIAN ASSISTANT

## 2021-11-17 PROCEDURE — 4004F PT TOBACCO SCREEN RCVD TLK: CPT | Performed by: PHYSICIAN ASSISTANT

## 2021-11-17 PROCEDURE — G8484 FLU IMMUNIZE NO ADMIN: HCPCS | Performed by: PHYSICIAN ASSISTANT

## 2021-11-17 PROCEDURE — G8417 CALC BMI ABV UP PARAM F/U: HCPCS | Performed by: PHYSICIAN ASSISTANT

## 2021-11-17 PROCEDURE — 99213 OFFICE O/P EST LOW 20 MIN: CPT | Performed by: PHYSICIAN ASSISTANT

## 2021-11-17 RX ORDER — GALCANEZUMAB 120 MG/ML
120 INJECTION, SOLUTION SUBCUTANEOUS
COMMUNITY
Start: 2021-11-03 | End: 2022-10-21

## 2021-11-17 RX ORDER — RIMEGEPANT SULFATE 75 MG/75MG
1 TABLET, ORALLY DISINTEGRATING ORAL PRN
COMMUNITY
Start: 2021-11-03 | End: 2022-10-21

## 2021-11-17 ASSESSMENT — ENCOUNTER SYMPTOMS
COUGH: 0
SHORTNESS OF BREATH: 0

## 2021-11-17 NOTE — PROGRESS NOTES
Visit Information    Have you changed or started any medications since your last visit including any over-the-counter medicines, vitamins, or herbal medicines? no   Have you stopped taking any of your medications? Is so, why? -  no  Are you having any side effects from any of your medications? - no    Have you seen any other physician or provider since your last visit?  no   Have you had any other diagnostic tests since your last visit?  no   Have you been seen in the emergency room and/or had an admission in a hospital since we last saw you?  no   Have you had your routine dental cleaning in the past 6 months?  no     Do you have an active MyChart account? If no, what is the barrier?   Yes    Patient Care Team:  Sarah Rios PA-C as PCP - General (Physician Assistant)  [facility]  LETY Nixon CNP as Nurse Practitioner (Family Nurse Practitioner)    Medical History Review  Past Medical, Family, and Social History reviewed and  contribute to the patient presenting condition    Health Maintenance   Topic Date Due    Hepatitis C screen  Never done    COVID-19 Vaccine (1) Never done    Pneumococcal 0-64 years Vaccine (1 of 2 - PPSV23) Never done    HIV screen  Never done    HPV vaccine (2 - 3-dose series) 09/28/2015    Hepatitis A vaccine (2 of 2 - 2-dose series) 02/29/2016    Chlamydia screen  12/08/2019    Flu vaccine (1) Never done    DTaP/Tdap/Td vaccine (7 - Td or Tdap) 08/29/2022    Pap smear  12/08/2023    Hepatitis B vaccine  Completed    Varicella vaccine  Completed    Meningococcal (ACWY) vaccine  Completed    Hib vaccine  Aged Out

## 2021-11-17 NOTE — PROGRESS NOTES
[facility]  [address] [address]  Dept: [phone]  Dept Fax: [phone]    Luis Kirkland is a 24 y.o. female who presents today for her medical conditions/complaintsas noted below. Luis Kirkland is c/o of   Chief Complaint   Patient presents with    Insomnia     no better    Medication Check     just started emgality,nurtec did not help         HPI:     HPI    Patient here for follow up on insomnia. She states still not sleeping well. Has not been a great sleeper most of her life. She is interested in taking the next step to get this worked up. She has not yet seen sleep medicine yet/sleep study. Presently she is getting around 6 hours of sleep each night  She prefers to get around 8 hours at night  She does nap most days, typically once for around 1 hour. She sets an alarm to not sleep past 1 hour  If she wakes up at night she has difficulty getting back to sleep. Caffeine at noon and again at 10pm at night  She goes to bed at midnight most nights  Time to get out of bed varies    She updates she has had heavy bleeding. She is working with her GYN on this  She would like to loose weight. Cannot exercise much due to aneurysm history. She notes she has gained weight over time. She does try to eat healthy options. Drinks water well daily. Patient also updates she has been doing well on her depression/anxiety medications.      Hemoglobin A1C (%)   Date Value   12/16/2020 5.0   11/06/2020 4.9   01/03/2019 5.0             ( goal A1Cis < 7)   No results found for: LABMICR  No results found for: LDLCHOLESTEROL, LDLCALC    (goal LDL is <100)   AST (U/L)   Date Value   12/20/2015 17     ALT (U/L)   Date Value   12/20/2015 11     BUN (mg/dL)   Date Value   09/29/2021 7     BP Readings from Last 3 Encounters:   11/17/21 116/62   09/29/21 129/86   08/03/21 102/64          (goal 120/80)    Past Medical History: Diagnosis Date    Aneurysm of internal carotid artery     Anxiety     Asthma     Carotid aneurysm, left (HCC)     Headache     History of angiography 06/24/2021    CEREBRAL ANGIOGRAM    Moderate episode of recurrent major depressive disorder ([facility] [address] 1/7/2019      Past Surgical History:   Procedure Laterality Date    CARDIAC CATHETERIZATION  12/17/2020    no stent here in [facility].  EYE SURGERY      lazy eye    OTHER SURGICAL HISTORY  12/16/2020    cerebral angiogram, stent placed       Family History   Problem Relation Age of Onset    Diabetes Maternal Cousin     No Known Problems Mother     Anemia Father     Breast Cancer Paternal Grandmother     Lung Cancer Paternal Grandfather     Heart Disease Sister     Other Paternal Aunt         cerebral aneurysm       Social History     Tobacco Use    Smoking status: Current Some Day Smoker     Packs/day: 0.25     Types: Cigarettes    Smokeless tobacco: Never Used   Substance Use Topics    Alcohol use: Not Currently      Current Outpatient Medications   Medication Sig Dispense Refill    Galcanezumab-gnlm (EMGALITY) 120 MG/ML SOAJ Inject 120 mg into the skin      Rimegepant Sulfate (NURTEC) 75 MG TBDP Take 1 tablet by mouth as needed      montelukast (SINGULAIR) 10 MG tablet Take 1 tablet by mouth nightly 30 tablet 5    sertraline (ZOLOFT) 50 MG tablet Take 1 tablet by mouth daily 30 tablet 5    buPROPion (WELLBUTRIN SR) 100 MG extended release tablet Take 1 tablet by mouth 2 times daily 60 tablet 3    vitamin B-12 (CYANOCOBALAMIN) 1000 MCG tablet Take 1 tablet by mouth daily 30 tablet 3    magnesium oxide (MAG-OX) 400 (241.3 Mg) MG TABS tablet TAKE ONE TABLET BY MOUTH DAILY FOR MIGRAINE 30 tablet 5    levonorgestrel-ethinyl estradiol (SEASONALE) 0.15-0.03 MG per tablet Take 1 tablet by mouth daily 1 packet 3    gabapentin (NEURONTIN) 300 MG capsule Take 300 mg by mouth. Daily as needed.  Rare use      aspirin EC 81 MG EC tablet Take 1 tablet by mouth daily 90 tablet 5    loratadine (CLARITIN) 10 MG tablet Take 10 mg by mouth nightly       No current facility-administered medications for this visit. Allergies   Allergen Reactions    Latex        Health Maintenance   Topic Date Due    Hepatitis C screen  Never done    COVID-19 Vaccine (1) Never done    Pneumococcal 0-64 years Vaccine (1 of 2 - PPSV23) Never done    HIV screen  Never done    HPV vaccine (2 - 3-dose series) 09/28/2015    Hepatitis A vaccine (2 of 2 - 2-dose series) 02/29/2016    Chlamydia screen  12/08/2019    Flu vaccine (1) Never done    DTaP/Tdap/Td vaccine (7 - Td or Tdap) 08/29/2022    Pap smear  12/08/2023    Hepatitis B vaccine  Completed    Varicella vaccine  Completed    Meningococcal (ACWY) vaccine  Completed    Hib vaccine  Aged Out       Subjective:     Review of Systems   Constitutional: Negative for activity change, appetite change, fatigue and fever. /62 (Site: Left Upper Arm, Position: Sitting, Cuff Size: Medium Adult)   Pulse 96   Temp 98.6 °F (37 °C) (Oral)   Ht 5' 2\" (1.575 m)   Wt 208 lb 9.6 oz (94.6 kg)   LMP 11/09/2021 (Approximate)   SpO2 99%   BMI 38.15 kg/m²    Respiratory: Negative for cough and shortness of breath. Cardiovascular: Negative for chest pain. Psychiatric/Behavioral: Positive for sleep disturbance. Negative for agitation, behavioral problems, confusion, decreased concentration and suicidal ideas. The patient is not nervous/anxious. Objective:     Physical Exam  Vitals and nursing note reviewed. Constitutional:       General: She is not in acute distress. Appearance: Normal appearance. She is well-developed. She is obese. She is not ill-appearing. HENT:      Head: Normocephalic and atraumatic. Skin:     General: Skin is warm and dry. Coloration: Skin is not pale. Findings: No erythema or rash. Neurological:      Mental Status: She is alert and oriented to person, place, and time. Psychiatric:         Mood and Affect: Mood normal.         Behavior: Behavior normal.         Thought Content: Thought content normal.         Judgment: Judgment normal.       /62 (Site: Left Upper Arm, Position: Sitting, Cuff Size: Medium Adult)   Pulse 96   Temp 98.6 °F (37 °C) (Oral)   Ht 5' 2\" (1.575 m)   Wt 208 lb 9.6 oz (94.6 kg)   LMP 11/09/2021 (Approximate)   SpO2 99%   BMI 38.15 kg/m²     Assessment:       Diagnosis Orders   1. Insomnia, unspecified type  External Referral To Pulmonology   2. Anxiety and depression               Plan:      Return if symptoms worsen or fail to improve. Patient given referral for pulmonologist/sleep medicine specialist  She will call for appointment to schedule  Stressed need to cut out PM /evening caffeine  Discussed proper sleep hygiene  She feels well on present dose zoloft and wellbutrin. Recent refills sent  Patient agreed with treatment plan    Orders Placed This Encounter   Procedures    External Referral To Pulmonology     Referral Priority:   Routine     Referral Type:   Eval and Treat     Referral Reason:   Specialty Services Required     Requested Specialty:   Sleep Medicine     Number of Visits Requested:   1       Patient given educational materials - see patient instructions. Discussed use, benefit, and side effects of prescribed medications. All patientquestions answered. Pt voiced understanding. Reviewed health maintenance. Instructedto continue current medications, diet and exercise. Patient agreed with treatmentplan. Follow up as directed.      Electronically signed by Pool Hutton PA-C on 11/17/2021 at 4:15 PM

## 2021-12-15 ENCOUNTER — PATIENT MESSAGE (OUTPATIENT)
Dept: OBGYN CLINIC | Age: 21
End: 2021-12-15

## 2021-12-15 NOTE — TELEPHONE ENCOUNTER
From: Jose L Chambers  To: Oris Severs  Sent: 12/15/2021 3:45 PM EST  Subject: Birth Control    My mom told me that you wanted to see me to talk about other birth control options and find another option for me because of me bleeding 20 days out of the month. If I come in for an appointment do I have to get a pap as well or will this just be a general appointment?

## 2021-12-17 ENCOUNTER — PATIENT MESSAGE (OUTPATIENT)
Dept: FAMILY MEDICINE CLINIC | Age: 21
End: 2021-12-17

## 2021-12-17 DIAGNOSIS — G47.00 INSOMNIA, UNSPECIFIED TYPE: Primary | ICD-10-CM

## 2021-12-20 ENCOUNTER — PATIENT MESSAGE (OUTPATIENT)
Dept: OBGYN CLINIC | Age: 21
End: 2021-12-20

## 2021-12-20 NOTE — TELEPHONE ENCOUNTER
From: Roxie Atkinson  To: Killian Terry  Sent: 12/17/2021 6:10 PM EST  Subject: Sleep    Would I be able to take Trazadone even though Im on Bupropion & Sertraline?

## 2021-12-20 NOTE — TELEPHONE ENCOUNTER
From: Janet Espinoza  To: Az Andrew  Sent: 12/20/2021 7:46 AM EST  Subject: Pain    I am having pain, redness and minimal swelling on my left outer lip. It charlette/hurts that area to  urinate and wipe but it does not itch at all. Is there anything I can get over the counter to help the irritation?

## 2021-12-21 ENCOUNTER — HOSPITAL ENCOUNTER (OUTPATIENT)
Age: 21
Discharge: HOME | End: 2021-12-21
Payer: MEDICARE

## 2021-12-21 ENCOUNTER — TELEMEDICINE (OUTPATIENT)
Dept: OBGYN CLINIC | Age: 21
End: 2021-12-21
Payer: MEDICARE

## 2021-12-21 DIAGNOSIS — N92.6 IRREGULAR MENSES: ICD-10-CM

## 2021-12-21 DIAGNOSIS — Z98.890 S/P CEREBRAL ANEURYSM REPAIR: ICD-10-CM

## 2021-12-21 DIAGNOSIS — N92.6 IRREGULAR MENSES: Primary | ICD-10-CM

## 2021-12-21 DIAGNOSIS — N92.1 BREAKTHROUGH BLEEDING ON BIRTH CONTROL PILLS: ICD-10-CM

## 2021-12-21 DIAGNOSIS — I67.1 ANEURYSM OF INTERNAL CAROTID ARTERY: ICD-10-CM

## 2021-12-21 DIAGNOSIS — Z86.79 S/P CEREBRAL ANEURYSM REPAIR: ICD-10-CM

## 2021-12-21 LAB
ABSOLUTE EOS #: 0.08 K/UL (ref 0–0.44)
ABSOLUTE IMMATURE GRANULOCYTE: 0.03 K/UL (ref 0–0.3)
ABSOLUTE LYMPH #: 2.68 K/UL (ref 1.1–3.7)
ABSOLUTE MONO #: 0.45 K/UL (ref 0.1–1.2)
BASOPHILS # BLD: 1 % (ref 0–2)
BASOPHILS ABSOLUTE: 0.05 K/UL (ref 0–0.2)
DIFFERENTIAL TYPE: ABNORMAL
EOSINOPHILS RELATIVE PERCENT: 1 % (ref 1–4)
FOLATE: 10 NG/ML
HCG QUANTITATIVE: <1 IU/L
HCT VFR BLD CALC: 41.6 % (ref 36.3–47.1)
HEMOGLOBIN: 13.2 G/DL (ref 11.9–15.1)
IMMATURE GRANULOCYTES: 0 %
INR BLD: 1.1
LYMPHOCYTES # BLD: 27 % (ref 24–43)
MCH RBC QN AUTO: 27.9 PG (ref 25.2–33.5)
MCHC RBC AUTO-ENTMCNC: 31.7 G/DL (ref 28.4–34.8)
MCV RBC AUTO: 87.9 FL (ref 82.6–102.9)
MONOCYTES # BLD: 5 % (ref 3–12)
NRBC AUTOMATED: 0 PER 100 WBC
PARTIAL THROMBOPLASTIN TIME: 28.6 SEC (ref 23.9–33.8)
PDW BLD-RTO: 12.9 % (ref 11.8–14.4)
PLATELET # BLD: 317 K/UL (ref 138–453)
PLATELET ESTIMATE: ABNORMAL
PMV BLD AUTO: 12.1 FL (ref 8.1–13.5)
PROLACTIN: 12.93 UG/L (ref 4.79–23.3)
PROTHROMBIN TIME: 13.8 SEC (ref 11.5–14.2)
RBC # BLD: 4.73 M/UL (ref 3.95–5.11)
RBC # BLD: ABNORMAL 10*6/UL
SEG NEUTROPHILS: 66 % (ref 36–65)
SEGMENTED NEUTROPHILS ABSOLUTE COUNT: 6.73 K/UL (ref 1.5–8.1)
TSH SERPL DL<=0.05 MIU/L-ACNC: 2.54 MIU/L (ref 0.3–5)
VITAMIN B-12: 738 PG/ML (ref 232–1245)
WBC # BLD: 10 K/UL (ref 3.5–11.3)
WBC # BLD: ABNORMAL 10*3/UL

## 2021-12-21 PROCEDURE — 82746 ASSAY OF FOLIC ACID SERUM: CPT

## 2021-12-21 PROCEDURE — 84702 CHORIONIC GONADOTROPIN TEST: CPT

## 2021-12-21 PROCEDURE — 85610 PROTHROMBIN TIME: CPT

## 2021-12-21 PROCEDURE — 85730 THROMBOPLASTIN TIME PARTIAL: CPT

## 2021-12-21 PROCEDURE — 84146 ASSAY OF PROLACTIN: CPT

## 2021-12-21 PROCEDURE — 99214 OFFICE O/P EST MOD 30 MIN: CPT | Performed by: NURSE PRACTITIONER

## 2021-12-21 PROCEDURE — 82607 VITAMIN B-12: CPT

## 2021-12-21 PROCEDURE — 36415 COLL VENOUS BLD VENIPUNCTURE: CPT

## 2021-12-21 PROCEDURE — G8427 DOCREV CUR MEDS BY ELIG CLIN: HCPCS | Performed by: NURSE PRACTITIONER

## 2021-12-21 PROCEDURE — 85025 COMPLETE CBC W/AUTO DIFF WBC: CPT

## 2021-12-21 PROCEDURE — 84443 ASSAY THYROID STIM HORMONE: CPT

## 2021-12-21 ASSESSMENT — ENCOUNTER SYMPTOMS
SHORTNESS OF BREATH: 0
NAUSEA: 0
COLOR CHANGE: 0
VOMITING: 0
WHEEZING: 0

## 2021-12-21 NOTE — PROGRESS NOTES
Ina Gregory (:  2000) is a 24 y.o. female,Established patient, here for evaluation of the following chief complaint(s): Menstrual Problem         ASSESSMENT/PLAN:  1. Irregular menses  -     CBC Auto Differential; Future  -     TSH with Reflex; Future  -     HCG, Quantitative, Pregnancy; Future  -     Protime-INR; Future  -     APTT; Future  -     US NON OB TRANSVAGINAL; Future  -     Prolactin; Future  -     Glucose, Fasting; Future  -     Insulin, total; Future  2. Breakthrough bleeding on birth control pills  -     CBC Auto Differential; Future  -     TSH with Reflex; Future  -     HCG, Quantitative, Pregnancy; Future  -     Protime-INR; Future  -     APTT; Future  -     US NON OB TRANSVAGINAL; Future  -     Prolactin; Future  -     Glucose, Fasting; Future  -     Insulin, total; Future  3. S/P cerebral aneurysm repair  4. Aneurysm of internal carotid artery     Will check labs and pelvic ultrasound, if normal discussed Management of irregular bleeding discussed with patient. Pros and cons of medical management with alternate OCPs, Depo-Provera injections or cyclic progestogens. She is going to trial stopping for 4 days then restarting OCP. With her hx would be hesitatnt to go to high on estrogen level of BCP. She will complete labs and US and schedule follow up and pap. Monitor severe pain especially ones sided, heavy bleeding >1 tampon/pad hr, fevers/chills notify us, go to ER. Return in about 4 weeks (around 2022) for annual exam f/u labs US. SUBJECTIVE/OBJECTIVE:  BARBARA     Homer Duran is here for follow up on contraceptives and discuss BTB irregular vaginal bleeding on OCp. She is currently on seasonale for irregualr periods/dysmenorrhea. Past 6 months irregular bleeding on OCP. She has hx of migraines but last year had severe headache on 12/15/20 and went to ER and found to have brain aneurysm.   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 She was discharged home on plavix and ASA 325mg. We had switched her to POP after that but was having irregular bleeding and wanted to go back on [facility] we consulted with neurologist and discussed per neurologist:     . As far as I know there are no direct interactions with the aspirin/clopidogrel with your birth control med.     However, be aware that in general the birth control does increase chances of clot formation, which does include clot formation at the stent site (which the aspirin/clopidogrel are meant to prevent). If it is the only med that works, I think you should take it, but there is increased risk    She was informed of risk and counseled but wanted to be on back on seasonal anyway. She alejandra hormone consent form in February 2021. She is no longer on plavix only on Apirin now. She states that the bleeding had improved initially with going back on seasonal but no irregular again. She states this month off/on bleeding for 20 days this month. She states that she sometimes clots sometimes not. She states she has had this intermittent bleeding now for past 6 months that waxes and wanes. Denies vaginal discharge odor or itching. Denies missing any birth control pills. No significant cramping pain. She is on new migraine injection medication but state shad bleeidng prior to this. denies  significant weight changes, significant stressors, fatigue, hair loss, palpitations,  galactorrhea,  easy bruising/bleeding. Denies pelvic pain, vaginal d/c odor or itching. Denies concern STI. Does have hx migraines no worsening symptoms recently. Denies CP, SOB,  vision changes, calf pain or tenderness. Denies hx of blood clot or clotting disorder. Last PAP: December 2020- ASCUS, -HPV, year prior LGSIL. Sexually active yes denies any new partners. Review of Systems   Constitutional: Negative for chills, fatigue, fever and unexpected weight change.    Respiratory: Negative for shortness of breath and wheezing. Cardiovascular: Negative for chest pain, palpitations and leg swelling. Gastrointestinal: Negative for nausea and vomiting. Endocrine: Negative for polydipsia and polyuria. Genitourinary: Positive for menstrual problem and vaginal bleeding. Negative for dyspareunia, dysuria, flank pain, frequency, pelvic pain, vaginal discharge and vaginal pain. Skin: Negative for color change and pallor. Neurological: Positive for headaches (migraines). Negative for dizziness and light-headedness. Hematological: Negative for adenopathy. Does not bruise/bleed easily. Psychiatric/Behavioral: Negative for self-injury and suicidal ideas.        Patient-Reported Vitals 12/21/2021   Patient-Reported Weight 200   Patient-Reported Height 52   Patient-Reported Systolic 041   Patient-Reported Diastolic 78   Patient-Reported Temperature 98.6        Physical Exam    [INSTRUCTIONS:  \"[x]\" Indicates a positive item  \"[]\" Indicates a negative item  -- DELETE ALL ITEMS NOT EXAMINED]    Constitutional: [x] Appears well-developed and well-nourished [x] No apparent distress      [] Abnormal -     Mental status: [x] Alert and awake  [x] Oriented to person/place/time [x] Able to follow commands    [] Abnormal -     Eyes:   EOM    [x]  Normal    [] Abnormal -   Sclera  [x]  Normal    [] Abnormal -          Discharge [x]  None visible   [] Abnormal -     HENT: [x] Normocephalic, atraumatic  [] Abnormal -   [x] Mouth/Throat: Mucous membranes are moist    External Ears [x] Normal  [] Abnormal -    Neck: [x] No visualized mass [] Abnormal -     Pulmonary/Chest: [x] Respiratory effort normal   [x] No visualized signs of difficulty breathing or respiratory distress        [] Abnormal -      Musculoskeletal:   [x] Normal gait with no signs of ataxia         [x] Normal range of motion of neck        [] Abnormal -     Neurological:        [x] No Facial Asymmetry (Cranial nerve 7 motor function) (limited exam due to video visit)          [x] No gaze palsy        [] Abnormal -          Skin:        [x] No significant exanthematous lesions or discoloration noted on facial skin         [] Abnormal -            Psychiatric:       [x] Normal Affect [] Abnormal -        [x] No Hallucinations    Other pertinent observable physical exam findings:-          On this date 12/21/2021 I have spent 30 minutes reviewing previous notes, test results and face to face (virtual) with the patient discussing the diagnosis and importance of compliance with the treatment plan as well as documenting on the day of the visit. Ina Arauz, was evaluated through a synchronous (real-time) audio-video encounter. The patient (or guardian if applicable) is aware that this is a billable service. Verbal consent to proceed has been obtained within the past 12 months. The visit was conducted pursuant to the emergency declaration under the [facility], [address] [place] [address] authority and the [employer] Act. Patient identification was verified, and a caregiver was present when appropriate. The patient was located in a state where the provider was credentialed to provide care. An electronic signature was used to authenticate this note.     --LETY Romero - CNP

## 2021-12-22 ENCOUNTER — PATIENT MESSAGE (OUTPATIENT)
Dept: FAMILY MEDICINE CLINIC | Age: 21
End: 2021-12-22

## 2021-12-22 ENCOUNTER — HOSPITAL ENCOUNTER (OUTPATIENT)
Age: 21
Discharge: HOME | End: 2021-12-22
Payer: MEDICARE

## 2021-12-22 ENCOUNTER — HOSPITAL ENCOUNTER (OUTPATIENT)
Dept: ULTRASOUND IMAGING | Age: 21
Discharge: HOME | End: 2021-12-24
Payer: MEDICARE

## 2021-12-22 DIAGNOSIS — N92.6 IRREGULAR MENSES: ICD-10-CM

## 2021-12-22 DIAGNOSIS — N92.1 BREAKTHROUGH BLEEDING ON BIRTH CONTROL PILLS: ICD-10-CM

## 2021-12-22 LAB
GLUCOSE FASTING: 79 MG/DL (ref 70–99)
INSULIN COMMENT: NORMAL
INSULIN REFERENCE RANGE:: NORMAL
INSULIN: 44.4 MU/L

## 2021-12-22 PROCEDURE — 83525 ASSAY OF INSULIN: CPT

## 2021-12-22 PROCEDURE — 76830 TRANSVAGINAL US NON-OB: CPT

## 2021-12-22 PROCEDURE — 36415 COLL VENOUS BLD VENIPUNCTURE: CPT

## 2021-12-22 PROCEDURE — 82947 ASSAY GLUCOSE BLOOD QUANT: CPT

## 2021-12-22 NOTE — TELEPHONE ENCOUNTER
From: Shivani Lima  To: Sven Reyna  Sent: 12/22/2021 6:30 AM EST  Subject: Question regarding VITAMIN B12     Hi Arpita I was wondering what 10.0 value means for folate

## 2022-01-03 ENCOUNTER — PATIENT MESSAGE (OUTPATIENT)
Dept: OBGYN CLINIC | Age: 22
End: 2022-01-03

## 2022-01-03 NOTE — TELEPHONE ENCOUNTER
From: Satinder Bowen  To: Jonah Wilson  Sent: 1/3/2022 1:55 PM EST  Subject: Irregular bleeding    I was wondering if there is anything you can do about my bleeding until my appointment. Im way passed over it. I stopped bleeding yesterday after bleeding for like 2 weeks and now today Im bleeding again and Im supposed to start my scheduled period at the end of my pill pack at the end of this month.  Please help

## 2022-01-05 RX ORDER — NORGESTIMATE AND ETHINYL ESTRADIOL 7DAYSX3 28
1 KIT ORAL DAILY
Qty: 28 TABLET | Refills: 3 | Status: SHIPPED | OUTPATIENT
Start: 2022-01-05 | End: 2022-04-25 | Stop reason: SDUPTHER

## 2022-01-05 NOTE — TELEPHONE ENCOUNTER
Stop birth control for 4 days and then start this one, inform her use back up method for 30 days at least- ie condoms

## 2022-01-20 ENCOUNTER — PATIENT MESSAGE (OUTPATIENT)
Dept: FAMILY MEDICINE CLINIC | Age: 22
End: 2022-01-20

## 2022-01-20 ENCOUNTER — HOSPITAL ENCOUNTER (EMERGENCY)
Age: 22
Discharge: HOME | End: 2022-01-20
Attending: EMERGENCY MEDICINE
Payer: MEDICARE

## 2022-01-20 ENCOUNTER — APPOINTMENT (OUTPATIENT)
Dept: CT IMAGING | Age: 22
End: 2022-01-20
Payer: MEDICARE

## 2022-01-20 VITALS
HEART RATE: 98 BPM | OXYGEN SATURATION: 97 % | TEMPERATURE: 98.3 F | HEIGHT: 62 IN | RESPIRATION RATE: 18 BRPM | BODY MASS INDEX: 36.8 KG/M2 | SYSTOLIC BLOOD PRESSURE: 127 MMHG | DIASTOLIC BLOOD PRESSURE: 92 MMHG | WEIGHT: 200 LBS

## 2022-01-20 DIAGNOSIS — R42 DIZZINESS: Primary | ICD-10-CM

## 2022-01-20 LAB
ABSOLUTE EOS #: 0.08 K/UL (ref 0–0.44)
ABSOLUTE IMMATURE GRANULOCYTE: 0.01 K/UL (ref 0–0.3)
ABSOLUTE LYMPH #: 3.18 K/UL (ref 1.1–3.7)
ABSOLUTE MONO #: 0.4 K/UL (ref 0.1–1.2)
ANION GAP SERPL CALCULATED.3IONS-SCNC: 11 MMOL/L (ref 9–17)
BASOPHILS # BLD: 0 % (ref 0–2)
BASOPHILS ABSOLUTE: <0.03 K/UL (ref 0–0.2)
BUN BLDV-MCNC: 14 MG/DL (ref 6–20)
BUN/CREAT BLD: 21 (ref 9–20)
CALCIUM SERPL-MCNC: 8.8 MG/DL (ref 8.6–10.4)
CHLORIDE BLD-SCNC: 103 MMOL/L (ref 98–107)
CO2: 22 MMOL/L (ref 20–31)
CREAT SERPL-MCNC: 0.66 MG/DL (ref 0.5–0.9)
DIFFERENTIAL TYPE: NORMAL
EOSINOPHILS RELATIVE PERCENT: 1 % (ref 1–4)
GFR AFRICAN AMERICAN: >60 ML/MIN
GFR NON-AFRICAN AMERICAN: >60 ML/MIN
GFR SERPL CREATININE-BSD FRML MDRD: ABNORMAL ML/MIN/{1.73_M2}
GFR SERPL CREATININE-BSD FRML MDRD: ABNORMAL ML/MIN/{1.73_M2}
GLUCOSE BLD-MCNC: 81 MG/DL (ref 70–99)
HCG QUALITATIVE: NEGATIVE
HCT VFR BLD CALC: 41.3 % (ref 36.3–47.1)
HEMOGLOBIN: 13.3 G/DL (ref 11.9–15.1)
IMMATURE GRANULOCYTES: 0 %
LYMPHOCYTES # BLD: 37 % (ref 24–43)
MAGNESIUM: 2.2 MG/DL (ref 1.6–2.6)
MCH RBC QN AUTO: 27.8 PG (ref 25.2–33.5)
MCHC RBC AUTO-ENTMCNC: 32.2 G/DL (ref 28.4–34.8)
MCV RBC AUTO: 86.4 FL (ref 82.6–102.9)
MONOCYTES # BLD: 5 % (ref 3–12)
NRBC AUTOMATED: 0 PER 100 WBC
PDW BLD-RTO: 12.5 % (ref 11.8–14.4)
PLATELET # BLD: 219 K/UL (ref 138–453)
PLATELET ESTIMATE: NORMAL
PMV BLD AUTO: 11.4 FL (ref 8.1–13.5)
POTASSIUM SERPL-SCNC: 3.9 MMOL/L (ref 3.7–5.3)
RBC # BLD: 4.78 M/UL (ref 3.95–5.11)
RBC # BLD: NORMAL 10*6/UL
SEG NEUTROPHILS: 57 % (ref 36–65)
SEGMENTED NEUTROPHILS ABSOLUTE COUNT: 4.98 K/UL (ref 1.5–8.1)
SODIUM BLD-SCNC: 136 MMOL/L (ref 135–144)
WBC # BLD: 8.7 K/UL (ref 3.5–11.3)
WBC # BLD: NORMAL 10*3/UL

## 2022-01-20 PROCEDURE — 2580000003 HC RX 258: Performed by: NURSE PRACTITIONER

## 2022-01-20 PROCEDURE — 96360 HYDRATION IV INFUSION INIT: CPT

## 2022-01-20 PROCEDURE — 99285 EMERGENCY DEPT VISIT HI MDM: CPT

## 2022-01-20 PROCEDURE — 84703 CHORIONIC GONADOTROPIN ASSAY: CPT

## 2022-01-20 PROCEDURE — 70450 CT HEAD/BRAIN W/O DYE: CPT

## 2022-01-20 PROCEDURE — 93005 ELECTROCARDIOGRAM TRACING: CPT | Performed by: NURSE PRACTITIONER

## 2022-01-20 PROCEDURE — 80048 BASIC METABOLIC PNL TOTAL CA: CPT

## 2022-01-20 PROCEDURE — 6370000000 HC RX 637 (ALT 250 FOR IP): Performed by: NURSE PRACTITIONER

## 2022-01-20 PROCEDURE — 85025 COMPLETE CBC W/AUTO DIFF WBC: CPT

## 2022-01-20 PROCEDURE — 83735 ASSAY OF MAGNESIUM: CPT

## 2022-01-20 RX ORDER — MECLIZINE HCL 12.5 MG/1
25 TABLET ORAL ONCE
Status: COMPLETED | OUTPATIENT
Start: 2022-01-20 | End: 2022-01-20

## 2022-01-20 RX ORDER — 0.9 % SODIUM CHLORIDE 0.9 %
1000 INTRAVENOUS SOLUTION INTRAVENOUS ONCE
Status: COMPLETED | OUTPATIENT
Start: 2022-01-20 | End: 2022-01-20

## 2022-01-20 RX ORDER — MECLIZINE HYDROCHLORIDE 25 MG/1
25 TABLET ORAL 3 TIMES DAILY PRN
Qty: 30 TABLET | Refills: 0 | Status: SHIPPED | OUTPATIENT
Start: 2022-01-20 | End: 2022-01-30

## 2022-01-20 RX ADMIN — MECLIZINE 25 MG: 12.5 TABLET ORAL at 20:18

## 2022-01-20 RX ADMIN — SODIUM CHLORIDE 1000 ML: 9 INJECTION, SOLUTION INTRAVENOUS at 19:46

## 2022-01-20 ASSESSMENT — ENCOUNTER SYMPTOMS
RHINORRHEA: 0
PHOTOPHOBIA: 0
SHORTNESS OF BREATH: 0
NAUSEA: 1
DIARRHEA: 0
ABDOMINAL PAIN: 0
COUGH: 0
COLOR CHANGE: 0
VOMITING: 1
EYE PAIN: 0
BACK PAIN: 0

## 2022-01-20 ASSESSMENT — PAIN SCALES - GENERAL: PAINLEVEL_OUTOF10: 6

## 2022-01-20 NOTE — TELEPHONE ENCOUNTER
From: Taryn Barrera  To: Gaviota Magaña  Sent: 1/20/2022 3:31 PM EST  Subject: Symptoms    Hi I was wondering if you could tell me what to do about this or what it could be? I got really dizzy in the shower last night and couldnt see anything and ended up throwing up I then was lightheaded and nauseous the rest of the night. I was fine this morning but starting about an hour ago Ive been lightheaded and nauseous again.  Im not sick and dont have any other symptoms besides lightheadedness and nausea

## 2022-01-21 LAB
EKG ATRIAL RATE: 86 BPM
EKG P AXIS: 26 DEGREES
EKG P-R INTERVAL: 136 MS
EKG Q-T INTERVAL: 380 MS
EKG QRS DURATION: 84 MS
EKG QTC CALCULATION (BAZETT): 454 MS
EKG R AXIS: 21 DEGREES
EKG T AXIS: 31 DEGREES
EKG VENTRICULAR RATE: 86 BPM

## 2022-01-21 PROCEDURE — 93010 ELECTROCARDIOGRAM REPORT: CPT | Performed by: INTERNAL MEDICINE

## 2022-01-21 NOTE — ED PROVIDER NOTES
SERTRALINE (ZOLOFT) 50 MG TABLET    Take 1 tablet by mouth daily    VITAMIN B-12 (CYANOCOBALAMIN) 1000 MCG TABLET    Take 1 tablet by mouth daily       PAST MEDICAL HISTORY         Diagnosis Date    Aneurysm of internal carotid artery     Anxiety     Asthma     Carotid aneurysm, left (HCC)     Headache     History of angiography 2021    CEREBRAL ANGIOGRAM    Moderate episode of recurrent major depressive disorder ([facility] [address] 2019       SURGICAL HISTORY           Procedure Laterality Date    CARDIAC CATHETERIZATION  2020    no stent here in tcc.  EYE SURGERY      lazy eye    OTHER SURGICAL HISTORY  2020    cerebral angiogram, stent placed         FAMILY HISTORY           Problem Relation Age of Onset    Diabetes Maternal Cousin     No Known Problems Mother     Anemia Father     Breast Cancer Paternal Grandmother     Lung Cancer Paternal Grandfather     Heart Disease Sister     Other Paternal Aunt         cerebral aneurysm     Family Status   Relation Name Status    MCousin  Alive    Mother  Alive    Father  Alive    PGM      PGF      Sister  Alive    PAunt          SOCIAL HISTORY      reports that she has been smoking cigarettes. She has been smoking about 0.25 packs per day. She has never used smokeless tobacco. She reports previous alcohol use. She reports that she does not use drugs. REVIEW OF SYSTEMS    (2-9 systems for level 4, 10 or more for level 5)     Review of Systems   Constitutional: Negative for chills, diaphoresis, fatigue and fever. HENT: Negative for congestion and rhinorrhea. Eyes: Negative for photophobia, pain and visual disturbance. Respiratory: Negative for cough and shortness of breath. Cardiovascular: Negative for chest pain. Gastrointestinal: Positive for nausea and vomiting. Negative for abdominal pain and diarrhea. Genitourinary: Negative for dysuria.    Musculoskeletal: Negative for arthralgias, back pain, myalgias, neck pain and neck stiffness. Skin: Negative for color change, rash and wound. Neurological: Positive for dizziness. Negative for syncope, facial asymmetry, speech difficulty, weakness, light-headedness and numbness. Psychiatric/Behavioral: Negative for confusion. Except as noted above the remainder of the review of systems was reviewed and negative. PHYSICAL EXAM    (up to 7 for level 4, 8 or more for level 5)     ED Triage Vitals   BP Temp Temp Source Pulse Resp SpO2 Height Weight   01/20/22 1810 01/20/22 1811 01/20/22 1811 01/20/22 1810 01/20/22 1810 01/20/22 1810 01/20/22 1810 01/20/22 1810   (!) 127/92 98.3 °F (36.8 °C) Oral 98 18 97 % 5' 2\" (1.575 m) 200 lb (90.7 kg)     Physical Exam  Vitals reviewed. Constitutional:       General: She is not in acute distress. Appearance: She is well-developed. She is not diaphoretic. HENT:      Right Ear: External ear normal.      Left Ear: External ear normal.   Eyes:      Extraocular Movements: Extraocular movements intact. Conjunctiva/sclera: Conjunctivae normal.      Pupils: Pupils are equal, round, and reactive to light. Cardiovascular:      Rate and Rhythm: Normal rate and regular rhythm. Pulmonary:      Effort: Pulmonary effort is normal. No respiratory distress. Breath sounds: No stridor. Musculoskeletal:      Cervical back: Neck supple. No rigidity. Comments: Moves extremities. Skin:     General: Skin is warm and dry. Findings: No rash. Neurological:      General: No focal deficit present. Mental Status: She is alert and oriented to person, place, and time. GCS: GCS eye subscore is 4. GCS verbal subscore is 5. GCS motor subscore is 6. Cranial Nerves: Cranial nerves are intact. No cranial nerve deficit. Motor: Motor function is intact. Coordination: Coordination is intact. Gait: Gait is intact.    Psychiatric:         Behavior: Behavior normal.         DIAGNOSTIC RESULTS EKG: All EKG's are interpreted by the Emergency Department Physician who either signs or Co-signs this chart in the absence of a cardiologist.  EKG was interpreted by Dr. Jazzy Manriquez after completion. RADIOLOGY:   Non-plain film images such as CT, Ultrasound and MRI are read by the radiologist. Plain radiographic images are visualized and preliminarily interpreted by the emergency physician with the below findings:    Interpretation per the Radiologist below, if available at the time of this note:      CT HEAD WO CONTRAST    Result Date: 1/20/2022  EXAMINATION: CT OF THE HEAD WITHOUT CONTRAST  1/20/2022 8:36 pm TECHNIQUE: CT of the head was performed without the administration of intravenous contrast. Dose modulation, iterative reconstruction, and/or weight based adjustment of the mA/kV was utilized to reduce the radiation dose to as low as reasonably achievable. COMPARISON: September 29, 2021 HISTORY: ORDERING SYSTEM PROVIDED HISTORY: dizziness TECHNOLOGIST PROVIDED HISTORY: dizziness Decision Support Exception - unselect if not a suspected or confirmed emergency medical condition->Emergency Medical Condition (MA) Is the patient pregnant?->No Reason for Exam: Dizziness, nausea, vomiting FINDINGS: BRAIN/VENTRICLES: There is no acute intracranial hemorrhage, mass effect or midline shift. No abnormal extra-axial fluid collection. The gray-white differentiation is maintained without evidence of an acute infarct. There is no evidence of hydrocephalus. Endovascular stent left cavernous segment unchanged in position. ORBITS: The visualized portion of the orbits demonstrate no acute abnormality. SINUSES: The visualized paranasal sinuses and mastoid air cells demonstrate no acute abnormality. SOFT TISSUES/SKULL:  No acute abnormality of the visualized skull or soft tissues. No acute intracranial abnormality. Cavernous segment stent on the left unchanged.          LABS:  Labs Reviewed   BASIC METABOLIC PANEL - Abnormal; Notable for the following components:       Result Value    Bun/Cre Ratio 21 (*)     All other components within normal limits   HCG, SERUM, QUALITATIVE   MAGNESIUM   CBC WITH AUTO DIFFERENTIAL       All other labs were within normal range or not returned as of this dictation. EMERGENCY DEPARTMENT COURSE and DIFFERENTIAL DIAGNOSIS/MDM:   Vitals:    Vitals:    01/20/22 1810 01/20/22 1811   BP: (!) 127/92    Pulse: 98    Resp: 18    Temp:  98.3 °F (36.8 °C)   TempSrc:  Oral   SpO2: 97%    Weight: 200 lb (90.7 kg)    Height: 5' 2\" (1.575 m)          MEDICATIONS GIVEN IN THE ED:  Medications   0.9 % sodium chloride bolus (1,000 mLs IntraVENous New Bag 1/20/22 1946)   meclizine (ANTIVERT) tablet 25 mg (25 mg Oral Given 1/20/22 2018)       CLINICAL DECISION MAKING:  The patient presented alert with a nontoxic appearance and was seen in conjunction with Dr. Carrie Tidwell. She reported significant improvement with the medication she was given here in the ED. Laboratory studies were unremarkable. Imaging was negative for acute findings. Evaluation and treatment course in the ED, and plan of care upon discharge was discussed in length with the patient. Patient had no further questions prior to being discharged and was instructed to return to the ED for new or worsening symptoms. Care was provided during an unprecedented national emergency due to the novel coronavirus, Covid-19. FINAL IMPRESSION      1. Dizziness            Problem List  Patient Active Problem List   Diagnosis Code    Moderate episode of recurrent major depressive disorder ([facility] [address] F33.1    Unintended weight gain R63.5    Obesity (BMI 30.0-34. 9) E66.9    Chronic fatigue R53.82    Moderate persistent asthma without complication N97.64    Hyperglycemia R73.9    LGSIL on Pap smear of cervix R87.612    Subarachnoid hemorrhage (HCC) I60.9    Aneurysm of internal carotid artery I67.1    S/P cerebral aneurysm repair Z98.890, Z86.79    Aneurysm ([facility] [address] I72.9         DISPOSITION/PLAN   DISPOSITION Decision To Discharge 01/20/2022 10:00:18 PM      PATIENT REFERRED TO:   David Jc PA-C  [address]   [address]  [phone]    Schedule an appointment as soon as possible for a visit       [facility] ED  [address] [facility]  [phone]    If symptoms worsen, As needed      DISCHARGE MEDICATIONS:     New Prescriptions    MECLIZINE (ANTIVERT) 25 MG TABLET    Take 1 tablet by mouth 3 times daily as needed for Dizziness           (Please note that portions of this note were completed with a voice recognition program.  Efforts were made to edit the dictations but occasionally words are mis-transcribed.)    LETY Andersen CNP, APRN - CNP  01/20/22 0776

## 2022-01-25 ENCOUNTER — OFFICE VISIT (OUTPATIENT)
Dept: OBGYN CLINIC | Age: 22
End: 2022-01-25
Payer: MEDICARE

## 2022-01-25 ENCOUNTER — HOSPITAL ENCOUNTER (OUTPATIENT)
Age: 22
Setting detail: SPECIMEN
Discharge: HOME | End: 2022-01-25

## 2022-01-25 VITALS — DIASTOLIC BLOOD PRESSURE: 64 MMHG | BODY MASS INDEX: 37.86 KG/M2 | SYSTOLIC BLOOD PRESSURE: 126 MMHG | WEIGHT: 207 LBS

## 2022-01-25 DIAGNOSIS — N92.1 BREAKTHROUGH BLEEDING: ICD-10-CM

## 2022-01-25 DIAGNOSIS — E88.81 INSULIN RESISTANCE: ICD-10-CM

## 2022-01-25 DIAGNOSIS — Z01.419 VISIT FOR GYNECOLOGIC EXAMINATION: Primary | ICD-10-CM

## 2022-01-25 PROBLEM — R63.5 UNINTENDED WEIGHT GAIN: Status: RESOLVED | Noted: 2019-01-07 | Resolved: 2022-01-25

## 2022-01-25 PROBLEM — R87.612 LGSIL ON PAP SMEAR OF CERVIX: Status: RESOLVED | Noted: 2019-11-11 | Resolved: 2022-01-25

## 2022-01-25 LAB
CANDIDA SPECIES, DNA PROBE: NEGATIVE
GARDNERELLA VAGINALIS, DNA PROBE: POSITIVE
SOURCE: ABNORMAL
TRICHOMONAS VAGINALIS DNA: NEGATIVE

## 2022-01-25 PROCEDURE — G8484 FLU IMMUNIZE NO ADMIN: HCPCS | Performed by: NURSE PRACTITIONER

## 2022-01-25 PROCEDURE — 99395 PREV VISIT EST AGE 18-39: CPT | Performed by: NURSE PRACTITIONER

## 2022-01-25 RX ORDER — METFORMIN HYDROCHLORIDE 500 MG/1
500 TABLET, EXTENDED RELEASE ORAL
Qty: 30 TABLET | Refills: 3 | Status: SHIPPED | OUTPATIENT
Start: 2022-01-25 | End: 2022-04-25 | Stop reason: SDUPTHER

## 2022-01-25 ASSESSMENT — ENCOUNTER SYMPTOMS
COLOR CHANGE: 0
WHEEZING: 0
NAUSEA: 0
SHORTNESS OF BREATH: 0

## 2022-01-25 NOTE — PATIENT INSTRUCTIONS
Patient Education        Pap Test: Care Instructions  Overview     The Pap test (also called a Pap smear) is a screening test for cancer of the cervix, which is the lower part of the uterus that opens into the vagina. The test can help your doctor find early changes in the cells that could lead to cancer. The sample of cells taken during your test has been sent to a lab so that an expert can look at the cells. It usually takes a week or two to get the results back. Follow-up care is a key part of your treatment and safety. Be sure to make and go to all appointments, and call your doctor if you are having problems. It's also a good idea to know your test results and keep a list of the medicines you take. What do the results mean? · A normal result means that the test did not find any abnormal cells in the sample. · An abnormal result can mean many things. Most of these are not cancer. The results of your test may be abnormal because:  ? You have an infection of the vagina or cervix, such as a yeast infection. ? You have an IUD (intrauterine device for birth control). ? You have low estrogen levels after menopause that are causing the cells to change. ? You have cell changes that may be a sign of precancer or cancer. The results are ranked based on how serious the changes might be. There are many other reasons why you might not get a normal result. If the results were abnormal, you may need to get another test within a few weeks or months. If the results show changes that could be a sign of cancer, you may need a test called a colposcopy, which provides a more complete view of the cervix. Sometimes the lab cannot use the sample because it does not contain enough cells or was not preserved well. If so, you may need to have the test again. This is not common, but it does happen from time to time. When should you call for help?   Watch closely for changes in your health, and be sure to contact your doctor if:    · You have vaginal bleeding or pain for more than 2 days after the test. It is normal to have a small amount of bleeding for a day or two after the test.   Where can you learn more? Go to [email or web address].[email or web address] and sign in to your [employer] account. Enter V771 in the [facility] box to learn more about \"Pap Test: Care Instructions. \"     If you do not have an account, please click on the \"Sign Up Now\" link. Current as of: September 8, 2021               Content Version: 13.1  © 3339-6146 [employer]. Care instructions adapted under license by [facility]. If you have questions about a medical condition or this instruction, always ask your healthcare professional. [address] any warranty or liability for your use of this information. Patient Education        Combination Birth Control Pills: Care Instructions  Overview     Combination birth control pills are used to prevent pregnancy. They give you a regular dose of the hormones estrogen and progestin. You take a pill every day to prevent pregnancy. Birth control pills come in packs. The most common type has 3 weeks of hormone pills. Some packs have sugar pills (they do not contain any hormones) for the fourth week. During that fourth no-hormone week, you have your period. After the fourth week (28 days), you start a new pack. Some birth control pills are packaged in different ways. For example, some have hormone pills for the fourth week instead of sugar pills. This is called continuous use. Taking hormones for the entire month causes you to not have periods or to have fewer periods. Others are packaged so that you have a period every 3 months. Your doctor will tell you what type of pills you have. Follow-up care is a key part of your treatment and safety. Be sure to make and go to all appointments, and call your doctor if you are having problems.  It's also a good idea to know your test results and keep a list of the medicines you take. How can you care for yourself at home? How do you take the pill? · Follow your doctor's instructions about when to start taking your pills. Use backup birth control, such as a condom, or don't have intercourse for 7 days after you start your pills. · Take your pills every day, at about the same time of day. To help yourself do this, try to take them when you do something else every day, such as brushing your teeth. · You can use the pill continuously and skip your period. When you get to the week that you take hormone-free pills, skip those pills and instead start right away on your next pill pack. Continue to take your pill every day. Talk to your doctor if you have any questions. What if you forget to take a pill? Always read the label for specific instructions, or call your doctor. Here are some basic guidelines:  · If you miss 1 hormone pill, take it as soon as you remember. Ask your doctor if you may need to use a backup birth control method, such as a condom, or not have intercourse. · If you miss 2 or more hormone pills, take one as soon as you remember you forgot them. Then read the pill label or call your doctor about instructions on how to take your missed pills. Use a backup method of birth control or don't have intercourse for 7 days. Pregnancy is more likely if you miss more than 1 pill. · If you had intercourse, you can use emergency contraception to help prevent pregnancy. The most effective emergency contraception is an IUD (inserted by a doctor). You can also get emergency contraceptive pills. You can get them with a prescription from your doctor or without a prescription at most drugstores. What else do you need to know? · The pill can have side effects. ? You may have very light or skipped periods. ? You may have bleeding between periods (spotting). This usually decreases after 3 to 4 months.  If you're using the pill continuously, you won't have periods. But you may still have breakthrough bleeding. Talk to your doctor if you have problems with breakthrough bleeding. Even if you have this bleeding, the pill should still work well.  ? You may have mood changes, less interest in sex, or weight gain. · The pill may reduce acne, heavy bleeding and cramping, and symptoms of premenstrual syndrome. · Check with your doctor before you use any other medicines, including over-the-counter medicines, vitamins, herbal products, and supplements. Birth control hormones may not work as well to prevent pregnancy when combined with other medicines. · The pill doesn't protect against sexually transmitted infections (STIs), such as herpes or HIV/AIDS. If you're not sure whether your sex partner(s) might have an STI, use a condom to help protect against disease. When should you call for help? Call your doctor now or seek immediate medical care if:    · You have severe belly pain.     · You have signs of a blood clot, such as:  ? Pain in your calf, back of the knee, thigh, or groin. ? Redness and swelling in your leg or groin.     · You have blurred vision or other problems seeing.     · You have a severe headache.     · You have severe trouble breathing. Watch closely for changes in your health, and be sure to contact your doctor if:    · You think you might be pregnant.     · You think you may be depressed.     · You think you may have been exposed to or have a sexually transmitted infection. Where can you learn more? Go to https://PaxVaxfarhad.healthEvident [email or web address] org and sign in to your [employer] account. Enter L933 in the [employer] box to learn more about \"Combination Birth Control Pills: Care Instructions. \"     If you do not have an account, please click on the \"Sign Up Now\" link. Current as of: February 11, 2021               Content Version: 13.1  © 9827-3723 [employer].    Care instructions adapted under license by [facility] ([facility]). If you have questions about a medical condition or this instruction, always ask your healthcare professional. [place] [address] any warranty or liability for your use of this information.

## 2022-01-25 NOTE — PROGRESS NOTES
Kuldip Colmenares is a 24 y.o.  here for her annual exam.  The patient was seen and examined. The patients past medical, surgical, social and family history were reviewed. Current medications and allergies were reviewed, and documented in the chart. Exercise No  Diet Yes  Tobacco abuse No quit 3-4 weeks ago     Last PAP: 2020- ASCUS, -HPV 2019- LSIL had 1 PAP prior to that which was normal.   Family hx uterine or ovarian cancer-denies  Family hx of breast cancer -PGM   family hx colon cancer -denies  HPV vaccine: Completed        Sexually active: yes - bi new partners Dyspareunia: No, Vaginal discharge: no,  UTI symptoms: no, voiding difficulties: no, bowels regular:Yes bloating:no        Menstrual history:  Menarche age- 14, cycle every 3  Months lasts around 7 days  Birth control: oral contraceptive, denies hx of blood clot or clotting disorder. Denies chest pain or pressure, SOB, calf pain or swelling, headaches, or vision changes. She has been struggling with BTB with OCP for past few months now. She was just switched to tri sprintec is on third week of pack still having bleeding but not as consistent. She had pelvic US on 21-unremarkable. She had labs- CBC, APTT, PT/INR, prolactin, TSH, HCG, insulin and glucose. LAbs showed insulin resistance otherwise normal. Hgb was 12.1. She developed severe headache on 12/15/20 and went to ER and found to have brain aneurysm. 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 I had suggested progesterone only forms of hormonal contraceptive given hx of this tobacco abuse but she refuses. Her neurologist had previously [facility] noted not to be contraindicated but could increase risk blood clots she has been informed of this again- signed hormone consent form.          OB History    Para Term  AB Living   0 0 0 0 0 0   SAB IAB Ectopic Molar Multiple Live Births   0 0 0 0 0 0 Vitals:    01/25/22 1339   BP: 126/64   Site: Left Upper Arm   Position: Sitting   Cuff Size: Medium Adult   Weight: 207 lb (93.9 kg)       Wt Readings from Last 3 Encounters:   01/25/22 207 lb (93.9 kg)   01/20/22 200 lb (90.7 kg)   11/17/21 208 lb 9.6 oz (94.6 kg)     Past Medical History:   Diagnosis Date    Aneurysm of internal carotid artery     Anxiety     Asthma     Carotid aneurysm, left (HCC)     Headache     History of angiography 06/24/2021    CEREBRAL ANGIOGRAM    Moderate episode of recurrent major depressive disorder ([facility] [address] 1/7/2019                                                                   Past Surgical History:   Procedure Laterality Date    CARDIAC CATHETERIZATION  12/17/2020    no stent here in tcc.     EYE SURGERY      lazy eye    OTHER SURGICAL HISTORY  12/16/2020    cerebral angiogram, stent placed     Family History   Problem Relation Age of Onset    Diabetes Maternal Cousin     No Known Problems Mother     Anemia Father     Breast Cancer Paternal Grandmother     Lung Cancer Paternal Grandfather     Heart Disease Sister     Other Paternal Aunt         cerebral aneurysm     Social History     Tobacco Use   Smoking Status Current Some Day Smoker    Packs/day: 0.25    Types: Cigarettes   Smokeless Tobacco Never Used     Social History     Substance and Sexual Activity   Alcohol Use Not Currently        Social History     Tobacco History     Smoking Status  Current Some Day Smoker Smoking Frequency  0.25 packs/day Smoking Tobacco Type  Cigarettes    Smokeless Tobacco Use  Never Used          Alcohol History     Alcohol Use Status  Not Currently          Drug Use     Drug Use Status  No          Sexual Activity     Sexually Active  Yes Partners  Male              Allergies   Allergen Reactions    Latex      Current Outpatient Medications   Medication Sig Dispense Refill    metFORMIN (GLUCOPHAGE-XR) 500 MG extended release tablet Take 1 tablet by mouth daily (with self-injury and suicidal ideas. Objective:     Physical Exam  /64 (Site: Left Upper Arm, Position: Sitting, Cuff Size: Medium Adult)   Wt 207 lb (93.9 kg)   Breastfeeding No   BMI 37.86 kg/m²     Assessment:       Diagnosis Orders   1. Visit for gynecologic examination  PAP SMEAR   2. Insulin resistance  metFORMIN (GLUCOPHAGE-XR) 500 MG extended release tablet    Insulin, total    Comprehensive Metabolic Panel    Hemoglobin A1C   3. Breakthrough bleeding  VAGINITIS DNA PROBE    Chlamydia/GC DNA, Thin Prep       Breast exam completed. Pelvic exam pap smear collected and sent. Cultures sent Yes    Plan:   Collect pap   BSE reviewed, Mammogram ordered: no  STD prevention reviewed  Gardisil counseling completed for all patients 10-37 yo  BTB-  Cultures sent tx if +  Discussed again alternate hormonal contraceptives, although only first month of alternate OCP- will continue and give 3 months to monitor bleeding. BC OCP  Monitor severe pain especially ones sided, heavy bleeding >1 tampon/pad hr, fevers/chills notify us, go to ER. DVT/ACHEs signs reviewed with patient. Insulin resistance recommend diet and exercise and start metformin has been on in past. Recheck labs 3 months  Refill medication if appropriate  Diet & Exercise reviewed with pt. Preventive  Health through PCP   RV 3 months med check          Orders Placed This Encounter   Procedures    VAGINITIS DNA PROBE    Chlamydia/GC DNA, Thin Prep     Standing Status:   Future     Standing Expiration Date:   1/25/2023    PAP SMEAR     Patient History:    No LMP recorded. OBGYN Status: Having periods  Past Surgical History:  12/17/2020: CARDIAC CATHETERIZATION      Comment:  no stent here in tcc.   No date: EYE SURGERY      Comment:  lazy eye  12/16/2020: OTHER SURGICAL HISTORY      Comment:  cerebral angiogram, stent placed  Medications/Contraceptives Affecting Cytology     Combination Contraceptives - Oral Disp Start End     Norgestim-Eth Estrad Triphasic (TRI-SPRINTEC) 0.18/0.215/0.25 MG-35 MCG   TABS    28 tablet 1/5/2022     Sig: Take 1 tablet by mouth daily    Route: Oral       Social History    Tobacco Use      Smoking status: Current Some Day Smoker        Packs/day: 0.25        Types: Cigarettes      Smokeless tobacco: Never Used       Standing Status:   Future     Standing Expiration Date:   1/26/2023     Order Specific Question:   Collection Type     Answer: Thin Prep     Order Specific Question:   Prior Abnormal Pap Test     Answer:   No     Order Specific Question:   Screening or Diagnostic     Answer:   Screening     Order Specific Question:   HPV Requested? Answer:   Yes -  If ASCUS Reflex HPV     Order Specific Question:   High Risk Patient     Answer:   N/A    Insulin, total     Patient must be fasting for 12-14 hrs     Standing Status:   Future     Standing Expiration Date:   1/25/2023    Comprehensive Metabolic Panel     Standing Status:   Future     Standing Expiration Date:   1/25/2023    Hemoglobin A1C     Standing Status:   Future     Standing Expiration Date:   1/25/2023     Orders Placed This Encounter   Medications    metFORMIN (GLUCOPHAGE-XR) 500 MG extended release tablet     Sig: Take 1 tablet by mouth daily (with breakfast)     Dispense:  30 tablet     Refill:  3       Patient given educational materials - seepatient instructions. Discussed use, benefit, and side effects of prescribed medications. All patient questions answered. Pt voiced understanding. Reviewed health maintenance. Instructed to continue current medications, diet and exercise. Patient agreedwith treatment plan. Follow up as directed.       Electronically signed by LETY Kaiser CNP on 1/25/2022at 3:38 PM

## 2022-01-26 LAB
CHLAMYDIA BY THIN PREP: NEGATIVE
N. GONORRHOEAE DNA, THIN PREP: NEGATIVE
SPECIMEN DESCRIPTION: NORMAL

## 2022-01-27 ENCOUNTER — PATIENT MESSAGE (OUTPATIENT)
Dept: OBGYN CLINIC | Age: 22
End: 2022-01-27

## 2022-01-27 RX ORDER — METRONIDAZOLE 500 MG/1
500 TABLET ORAL 2 TIMES DAILY
Qty: 14 TABLET | Refills: 0 | Status: SHIPPED | OUTPATIENT
Start: 2022-01-27 | End: 2022-02-03

## 2022-01-27 NOTE — TELEPHONE ENCOUNTER
From: Adenike Nguyen  Sent: 1/27/2022 1:52 PM EST  To: [facility] Clinical Support Pool  Subject: results    My mom said the gel you have to stick up your arnulfo sauceda so Ill just take the flagyl

## 2022-02-04 RX ORDER — ASPIRIN 81 MG/1
TABLET, COATED ORAL
Qty: 30 TABLET | Refills: 5 | Status: SHIPPED | OUTPATIENT
Start: 2022-02-04 | End: 2022-08-24 | Stop reason: SDUPTHER

## 2022-02-08 LAB — CYTOLOGY REPORT: NORMAL

## 2022-02-10 LAB
HPV SAMPLE: NORMAL
HPV, GENOTYPE 16: NOT DETECTED
HPV, GENOTYPE 18: NOT DETECTED
HPV, HIGH RISK OTHER: NOT DETECTED
HPV, INTERPRETATION: NORMAL
SPECIMEN DESCRIPTION: NORMAL

## 2022-02-16 ENCOUNTER — PATIENT MESSAGE (OUTPATIENT)
Dept: FAMILY MEDICINE CLINIC | Age: 22
End: 2022-02-16

## 2022-02-16 DIAGNOSIS — E53.8 B12 DEFICIENCY: ICD-10-CM

## 2022-02-17 RX ORDER — LANOLIN ALCOHOL/MO/W.PET/CERES
1000 CREAM (GRAM) TOPICAL DAILY
Qty: 30 TABLET | Refills: 6 | Status: SHIPPED | OUTPATIENT
Start: 2022-02-17 | End: 2022-06-02 | Stop reason: SDUPTHER

## 2022-02-17 RX ORDER — LORATADINE 10 MG/1
10 TABLET ORAL NIGHTLY
Qty: 30 TABLET | Refills: 6 | Status: SHIPPED | OUTPATIENT
Start: 2022-02-17 | End: 2022-09-25

## 2022-02-17 NOTE — TELEPHONE ENCOUNTER
From: Angela Cuello  To: Julio Ledbetter  Sent: 2/16/2022 7:17 PM EST  Subject: Meds    Hi Arpita I was wondering if you could prescribe loratidine 10 and vitamin b12 1,000 to me? The prescribing physicians wont get back to me or the pharmacy and Brook Gorman been out of these meds for a week.

## 2022-02-19 ENCOUNTER — PATIENT MESSAGE (OUTPATIENT)
Dept: FAMILY MEDICINE CLINIC | Age: 22
End: 2022-02-19

## 2022-02-20 NOTE — TELEPHONE ENCOUNTER
From: Satinder Bowen  To: Maikellinda File  Sent: 2/19/2022 12:05 AM EST  Subject: Weight    Would Adipex be an option for me to try? Shawn heard a lot of good reviews with it suppressing appetite and I would like to give it a shot, once summer comes I will also start walking.

## 2022-03-16 RX ORDER — BUPROPION HYDROCHLORIDE 100 MG/1
TABLET, EXTENDED RELEASE ORAL
Qty: 60 TABLET | Refills: 3 | Status: SHIPPED | OUTPATIENT
Start: 2022-03-16 | End: 2022-07-21

## 2022-04-25 ENCOUNTER — TELEMEDICINE (OUTPATIENT)
Dept: OBGYN CLINIC | Age: 22
End: 2022-04-25
Payer: MEDICARE

## 2022-04-25 DIAGNOSIS — N94.6 DYSMENORRHEA: ICD-10-CM

## 2022-04-25 DIAGNOSIS — G43.709 CHRONIC MIGRAINE WITHOUT AURA WITHOUT STATUS MIGRAINOSUS, NOT INTRACTABLE: ICD-10-CM

## 2022-04-25 DIAGNOSIS — E88.81 INSULIN RESISTANCE: ICD-10-CM

## 2022-04-25 DIAGNOSIS — Z86.79 S/P CEREBRAL ANEURYSM REPAIR: ICD-10-CM

## 2022-04-25 DIAGNOSIS — Z98.890 S/P CEREBRAL ANEURYSM REPAIR: ICD-10-CM

## 2022-04-25 DIAGNOSIS — N92.6 IRREGULAR MENSES: Primary | ICD-10-CM

## 2022-04-25 DIAGNOSIS — R87.619 ABNORMAL CERVICAL PAPANICOLAOU SMEAR, UNSPECIFIED ABNORMAL PAP FINDING: ICD-10-CM

## 2022-04-25 PROCEDURE — 99214 OFFICE O/P EST MOD 30 MIN: CPT | Performed by: NURSE PRACTITIONER

## 2022-04-25 PROCEDURE — G8427 DOCREV CUR MEDS BY ELIG CLIN: HCPCS | Performed by: NURSE PRACTITIONER

## 2022-04-25 RX ORDER — METFORMIN HYDROCHLORIDE 500 MG/1
500 TABLET, EXTENDED RELEASE ORAL
Qty: 30 TABLET | Refills: 3 | Status: SHIPPED | OUTPATIENT
Start: 2022-04-25 | End: 2022-09-08

## 2022-04-25 RX ORDER — NORGESTIMATE AND ETHINYL ESTRADIOL 7DAYSX3 28
1 KIT ORAL DAILY
Qty: 28 TABLET | Refills: 9 | Status: SHIPPED | OUTPATIENT
Start: 2022-04-25 | End: 2022-08-18

## 2022-04-25 ASSESSMENT — ENCOUNTER SYMPTOMS
COLOR CHANGE: 0
VOMITING: 0
NAUSEA: 0
WHEEZING: 0
SHORTNESS OF BREATH: 0

## 2022-04-25 NOTE — PATIENT INSTRUCTIONS
Patient Education      Patient Education        metformin  Pronunciation: met FOR min  Brand:  Fortamet, Glucophage, Glucophage XR, Glumetza, MetFORMIN (Eqv-Fortamet),MetFORMIN (Eqv-Glucophage XR), MetFORMIN (Eqv-Glumetza), Riomet, Riomet ER  What is the most important information I should know about metformin? You should not use this medicine if you have severe kidney disease, metabolicacidosis, or diabetic ketoacidosis (call your doctor for treatment). If you need to have any type of x-ray or CT scan using a dye that is injected into your veins, you may need to temporarily stop taking metformin. You may develop lactic acidosis, a dangerous build-up of lactic acid in your blood. Call your doctor or get emergency medical help if you have unusual muscle pain, trouble breathing,stomach pain, dizziness, feeling cold, or feeling very weak or tired. What is metformin? Metformin is used together with diet and exercise to improve blood sugarcontrol in adults with type 2 diabetes mellitus. Metformin is sometimes used together with insulin or other medications, but metformin is not for treating type 1 diabetes. Metformin may also be used for purposes not listed in this medication guide. What should I discuss with my healthcare provider before taking metformin? You should not use metformin if you are allergic to it, or if you have:   severe kidney disease; or   metabolic acidosis or diabetic ketoacidosis (call your doctor for treatment). If you need to have surgery or any type of x-ray or CT scan using a dye that is injected into your veins, you may need to temporarily stop taking metformin. Be sure your caregivers know ahead of time that you are using this medication.   Tell your doctor if you have ever had:   kidney disease (your kidney function may need to be checked before you take this medicine);   high ketone levels in your blood or urine;   heart disease, congestive heart failure;   liver disease; or   if you also use insulin, or other oral diabetes medications. You may develop lactic acidosis, a dangerous build-up of lactic acid in your blood. This may be more likely if you have other medical conditions, a severe infection, chronic alcoholism, orif you are 72 or older. Ask your doctor about your risk. Follow your doctor's instructions about using this medicine if you are pregnant or you become pregnant. Controlling diabetes is very important during pregnancy, and having high bloodsugar may cause complications in both the mother and the baby. Metformin may stimulate ovulation in a premenopausal woman and may increase therisk of unintended pregnancy. Talk to your doctor about your risk. You should not breastfeed while using this medicine. Metformin should not be given to a child younger than 8years old. Some forms of metformin are not approved for use by anyone younger than 25years old. How should I take metformin? Follow all directions on your prescription label and read all medication guides or instruction sheets. Your doctor may occasionally change your dose. Use themedicine exactly as directed. Take metformin with a meal, unless your doctor tells you otherwise. Some forms of metformin are taken only once daily with the evening meal. Follow yourdoctor's instructions. Do not crush, chew, or break an extended-release tablet. Swallow it whole. Measure liquid medicine carefully. Use the dosing syringe provided, or use a medicine dose-measuringdevice (not a kitchen spoon). Shake the oral suspension (liquid) before you measure a dose. Use the dosing syringe provided, or use amedicine dose-measuring device (not a kitchen spoon). Some tablets are made with a shell that is not absorbed or melted in the body. Part of this shell may appear in your stool. This is normal and will not makethe medicine less effective.   You may have low blood sugar (hypoglycemia) and feel very hungry, dizzy, irritable, confused, anxious, or shaky. To quickly treat hypoglycemia, eat or drink a fast-acting source of sugar (fruitjuice, hard candy, crackers, raisins, or non-diet soda). Your doctor may prescribe a glucagon injection kit in case you have severe hypoglycemia. Be sure your family or close friends know how to give you thisinjection in an emergency. Blood sugar levels can be affected by stress, illness, surgery, exercise, alcohol use, or skipping meals. Ask your doctor before changing your dose or medication schedule. Metformin is only part of a complete treatment program that may also include diet, exercise, weight control, blood sugar testing, and special medical care. Follow your doctor's instructions very closely. Store at room temperature away from moisture, heat, and light. Your doctor may have you take extra vitamin B12 while you are taking metformin. Take only the amount of vitamin B12 that your doctor has prescribed. What happens if I miss a dose? Take the medicine as soon as you can, but skip the missed dose if it is almost time for your next dose. Do not take two doses at one time. What happens if I overdose? Seek emergency medical attention or call the Poison Help line at 1-580.864.8284. An overdose can cause severe hypoglycemia or lactic acidosis. What should I avoid while taking metformin? Avoid drinking alcohol. It lowers blood sugar and may increase your risk oflactic acidosis. What are the possible side effects of metformin? Get emergency medical help if you have signs of an allergic reaction: hives; difficult breathing; swelling of your face, lips, tongue, or throat. Some people using metformin develop lactic acidosis, which can be fatal. Get emergency medical help if you have even mild symptoms such as:   unusual muscle pain;   feeling cold;   trouble breathing;   feeling dizzy, light-headed, tired, or very weak;   stomach pain, vomiting; or   slow or irregular heart rate.   Common side effects may include:   low blood sugar;   nausea, upset stomach; or   diarrhea. This is not a complete list of side effects and others may occur. Call your doctor for medical advice about side effects. You may report side effects toFDA at 1-550-MEO-2497. What other drugs will affect metformin? Many drugs can affect metformin, making this medicine less effective or increasing your risk of lactic acidosis. This includes prescription and over-the-counter medicines, vitamins, and herbal products. Not all possible interactions are listed here. Tell your doctor about all your current medicines and any medicine you startor stop using. Where can I get more information? Your pharmacist can provide more information about metformin. Remember, keep this and all other medicines out of the reach of children, never share your medicines with others, and use this medication only for the indication prescribed. Every effort has been made to ensure that the information provided by [facility] is accurate, up-to-date, and complete, but no guarantee is made to that effect. Drug information contained herein may be time sensitive. [employer] information has been compiled for use by healthcare practitioners and consumers in the [place] and therefore [employer] does not warrant that uses outside of the [place] are appropriate, unless specifically indicated otherwise. [employer] drug information does not endorse drugs, diagnose patients or recommend therapy. [facility] drug information is an informational resource designed to assist licensed healthcare practitioners in caring for their patients and/or to serve consumers viewing this service as a supplement to, and not a substitute for, the expertise, skill, knowledge and judgment of healthcare practitioners.  The absence of a warning for a given drug or drug combination in no way should be construed to indicate that the drug or drug combination is safe, effective or appropriate for any given patient. [facility] does not assume any responsibility for any aspect of healthcare administered with the aid of information [facility] provides. The information contained herein is not intended to cover all possible uses, directions, precautions, warnings, drug interactions, allergic reactions, or adverse effects. If you have questions about the drugs you are taking, check with yourdoctor, nurse or pharmacist.  Copyright 8743-4670 [address] [place] [address] Dr DOTY. Version: 17.02. Revision date: 4/9/2020. Care instructions adapted under license by [facility] ([facility]). If you have questions about a medical condition or this instruction, always ask your healthcare professional. [place] [address] any warranty or liability for your use of this information. Combination Birth Control Pills: Care Instructions  Overview     Combination birth control pills are used to prevent pregnancy. They give you aregular dose of the hormones estrogen and progestin. You take a pill every day to prevent pregnancy. Birth control pills come in packs. The most common type has 3 weeks of hormone pills. Some packs have sugar pills (they do not contain any hormones) for the fourth week. During that fourth no-hormone week, you have your period. Afterthe fourth week (28 days), you start a new pack. Some birth control pills are packaged in different ways. For example, some have hormone pills for the fourth week instead of sugar pills. This is called continuous use. Taking hormones for the entire month causes you to not have periods or to have fewer periods. Others are packaged so that you have a periodevery 3 months. Your doctor will tell you what type of pills you have. Follow-up care is a key part of your treatment and safety. Be sure to make and go to all appointments, and call your doctor if you are having problems. It's also a good idea to know your test results and keep alist of the medicines you take.   How can you care for yourself at home? How do you take the pill?  Follow your doctor's instructions about when to start taking your pills. Use backup birth control, such as a condom, or don't have intercourse for 7 days after you start your pills.  Take your pills every day, at about the same time of day. To help yourself do this, try to take them when you do something else every day, such as brushing your teeth.  You can use the pill continuously and skip your period. When you get to the week that you take hormone-free pills, skip those pills and instead start right away on your next pill pack. Continue to take your pill every day. Talk to your doctor if you have any questions. What if you forget to take a pill? Always read the label for specific instructions, or call your doctor. Here aresome basic guidelines:   If you miss 1 hormone pill, take it as soon as you remember. Ask your doctor if you may need to use a backup birth control method, such as a condom, or not have intercourse.  If you miss 2 or more hormone pills, take one as soon as you remember you forgot them. Then read the pill label or call your doctor about instructions on how to take your missed pills. Use a backup method of birth control or don't have intercourse for 7 days. Pregnancy is more likely if you miss more than 1 pill.  If you had intercourse, you can use emergency contraception to help prevent pregnancy. The most effective emergency contraception is an IUD (inserted by a doctor). You can also get emergency contraceptive pills. You can get them with a prescription from your doctor or without a prescription at most drugstores. What else do you need to know?  The pill can have side effects. ? You may have very light or skipped periods. ? You may have bleeding between periods (spotting). This usually decreases after 3 to 4 months. If you're using the pill continuously, you won't have periods.  But you may still have breakthrough bleeding. Talk to your doctor if you have problems with breakthrough bleeding. Even if you have this bleeding, the pill should still work well.  ? You may have mood changes, less interest in sex, or weight gain.  The pill may reduce acne, heavy bleeding and cramping, and symptoms of premenstrual syndrome.  Check with your doctor before you use any other medicines, including over-the-counter medicines, vitamins, herbal products, and supplements. Birth control hormones may not work as well to prevent pregnancy when combined with other medicines.  The pill doesn't protect against sexually transmitted infections (STIs), such as herpes or HIV/AIDS. If you're not sure whether your sex partner(s) might have an STI, use a condom to help protect against disease. When should you call for help? Call your doctor now or seek immediate medical care if:     You have severe belly pain.      You have signs of a blood clot, such as:  ? Pain in your calf, back of the knee, thigh, or groin. ? Redness and swelling in your leg or groin.      You have blurred vision or other problems seeing.      You have a severe headache.      You have severe trouble breathing. Watch closely for changes in your health, and be sure to contact your doctor if:     You think you might be pregnant.      You think you may be depressed.      You think you may have been exposed to or have a sexually transmitted infection. Where can you learn more? Go to [email or web address] and sign in to your [email or web address] account. Enter K467 in the [facility] box to learn more about \"Combination Birth Control Pills: Care Instructions. \"     If you do not have an account, please click on the \"Sign Up Now\" link. Current as of: November 22, 2021               Content Version: 13.2  © 6831-0747 [employer], [employer]. Care instructions adapted under license by [facility] ([facility]).  If you have questions about a medical condition or this instruction, always ask your healthcare professional. [place] [address] any warranty or liability for your use of this information.

## 2022-04-25 NOTE — PROGRESS NOTES
Bita Ortiz (:  2000) is a Established patient, here for evaluation of the following:    Assessment & Plan   Below is the assessment and plan developed based on review of pertinent history, physical exam, labs, studies, and medications. 1. Irregular menses  -     Norgestim-Eth Estrad Triphasic (TRI-SPRINTEC) 0.18/0.215/0.25 MG-35 MCG TABS; Take 1 tablet by mouth daily, Disp-28 tablet, R-9Normal  2. Dysmenorrhea  -     Norgestim-Eth Estrad Triphasic (TRI-SPRINTEC) 0.18/0.215/0.25 MG-35 MCG TABS; Take 1 tablet by mouth daily, Disp-28 tablet, R-9Normal      Cont. Oral contraceptive. Call with any unusual bleeding, pain, discharge. DVT signs and symptoms reviewed with patient. 3. Insulin resistance  -     metFORMIN (GLUCOPHAGE-XR) 500 MG extended release tablet; Take 1 tablet by mouth daily (with breakfast), Disp-30 tablet, R-3Normal  - check labs as previously ordered. If insulin still elevated consider increasing metformin. 4. S/P cerebral aneurysm repair  5. Chronic migraine without aura without status migrainosus, not intractable  Continue with specialist   Denies increased h/a with [address]  6. Abnormal cervical Papanicolaou smear, unspecified abnormal pap finding    Return if symptoms worsen or fail to improve, for annual exam.       Eri   HPI     Ina is here for follow up on contraceptives. She is currently on tri sprintec for irregular periods and dysmenorrhea. She doing well on this medications. States her cycle is 28 days, lasts 7-10 days. Denies heavy bleeding or significant cramping. Denies CP, SOB,vision changes, calf pain or tenderness, BTB. Denies increased headaches, hx of migraines without aura follows with neurology. She has also had hx of cerebral aneurysm and have discussed risks with [facility]- VTE, CVA, HTN. We tried POP and she declined alternate progesterone only forms of contraception. Denies hx of blood clot or clotting disorder.   Last PAP: 2022- ASCUS, -HPV: repeat 1 year, was + ASCUS, -HPV last year too and LSIL year prior. Sexually active yes no new partners. LMP around 3/25/22    She also has hx insulin resistance she has been back on metformin and doing well. She denies any intolerances to it. Denies polyuria/polydipsia. She has lab orders to complete        Review of Systems   Constitutional: Negative for chills and fever. Respiratory: Negative for shortness of breath and wheezing. Cardiovascular: Negative for chest pain, palpitations and leg swelling. Gastrointestinal: Negative for nausea and vomiting. Endocrine: Negative for cold intolerance, heat intolerance and polydipsia. Genitourinary: Negative for dyspareunia, dysuria, menstrual problem, vaginal bleeding, vaginal discharge and vaginal pain. Skin: Negative for color change and pallor. Neurological: Positive for headaches (hx migraines). Negative for dizziness, syncope, speech difficulty, weakness and numbness. Hematological: Negative for adenopathy. Does not bruise/bleed easily. Psychiatric/Behavioral: Negative for self-injury and suicidal ideas.           Objective   Patient-Reported Vitals  No data recorded     Physical Exam  [INSTRUCTIONS:  \"[x]\" Indicates a positive item  \"[]\" Indicates a negative item  -- DELETE ALL ITEMS NOT EXAMINED]    Constitutional: [x] Appears well-developed and well-nourished [x] No apparent distress      [] Abnormal -     Mental status: [x] Alert and awake  [x] Oriented to person/place/time [x] Able to follow commands    [] Abnormal -     Eyes:   EOM    [x]  Normal    [] Abnormal -   Sclera  [x]  Normal    [] Abnormal -          Discharge [x]  None visible   [] Abnormal -     HENT: [x] Normocephalic, atraumatic  [] Abnormal -   [x] Mouth/Throat: Mucous membranes are moist    External Ears [x] Normal  [] Abnormal -    Neck: [x] No visualized mass [] Abnormal -     Pulmonary/Chest: [x] Respiratory effort normal   [x] No visualized signs of difficulty breathing or respiratory distress        [] Abnormal -      Musculoskeletal:   [x] Normal gait with no signs of ataxia         [x] Normal range of motion of neck        [] Abnormal -     Neurological:        [x] No Facial Asymmetry (Cranial nerve 7 motor function) (limited exam due to video visit)          [x] No gaze palsy        [] Abnormal -          Skin:        [x] No significant exanthematous lesions or discoloration noted on facial skin         [] Abnormal -            Psychiatric:       [x] Normal Affect [] Abnormal -        [x] No Hallucinations    Other pertinent observable physical exam findings:-             On this date 4/25/2022 I have spent 30 minutes reviewing previous notes, test results and face to face (virtual) with the patient discussing the diagnosis and importance of compliance with the treatment plan as well as documenting on the day of the visit. Ina Warner, was evaluated through a synchronous (real-time) audio-video encounter. The patient (or guardian if applicable) is aware that this is a billable service, which includes applicable co-pays. This Virtual Visit was conducted with patient's (and/or legal guardian's) consent. The visit was conducted pursuant to the emergency declaration under the [address] [place] [address], [address] [place] [address] wa[facility] authority and the [employer] Act. Patient identification was verified, and a caregiver was present when appropriate. The patient was located at home in a state where the provider was licensed to provide care.        --Jeanette Dangelo, APRN - CNP

## 2022-04-26 DIAGNOSIS — N94.6 DYSMENORRHEA: ICD-10-CM

## 2022-04-26 DIAGNOSIS — N92.6 IRREGULAR MENSES: ICD-10-CM

## 2022-04-26 RX ORDER — NORGESTIMATE AND ETHINYL ESTRADIOL 7DAYSX3 28
KIT ORAL
Qty: 28 TABLET | Refills: 9 | OUTPATIENT
Start: 2022-04-26

## 2022-05-15 RX ORDER — MONTELUKAST SODIUM 10 MG/1
TABLET ORAL
Qty: 30 TABLET | Refills: 5 | Status: SHIPPED | OUTPATIENT
Start: 2022-05-15 | End: 2022-10-21 | Stop reason: SDUPTHER

## 2022-05-17 ENCOUNTER — HOSPITAL ENCOUNTER (EMERGENCY)
Age: 22
Discharge: HOME | End: 2022-05-17
Attending: EMERGENCY MEDICINE
Payer: MEDICARE

## 2022-05-17 ENCOUNTER — APPOINTMENT (OUTPATIENT)
Dept: CT IMAGING | Age: 22
End: 2022-05-17
Payer: MEDICARE

## 2022-05-17 VITALS
RESPIRATION RATE: 18 BRPM | WEIGHT: 200 LBS | TEMPERATURE: 98.1 F | SYSTOLIC BLOOD PRESSURE: 128 MMHG | HEART RATE: 109 BPM | HEIGHT: 62 IN | DIASTOLIC BLOOD PRESSURE: 79 MMHG | BODY MASS INDEX: 36.8 KG/M2 | OXYGEN SATURATION: 100 %

## 2022-05-17 DIAGNOSIS — R51.9 NONINTRACTABLE HEADACHE, UNSPECIFIED CHRONICITY PATTERN, UNSPECIFIED HEADACHE TYPE: Primary | ICD-10-CM

## 2022-05-17 LAB
CHP ED QC CHECK: NORMAL
PREGNANCY TEST URINE, POC: NEGATIVE

## 2022-05-17 PROCEDURE — 96374 THER/PROPH/DIAG INJ IV PUSH: CPT

## 2022-05-17 PROCEDURE — 70450 CT HEAD/BRAIN W/O DYE: CPT

## 2022-05-17 PROCEDURE — 96375 TX/PRO/DX INJ NEW DRUG ADDON: CPT

## 2022-05-17 PROCEDURE — 81025 URINE PREGNANCY TEST: CPT

## 2022-05-17 PROCEDURE — 2580000003 HC RX 258: Performed by: PHYSICIAN ASSISTANT

## 2022-05-17 PROCEDURE — 6360000002 HC RX W HCPCS: Performed by: PHYSICIAN ASSISTANT

## 2022-05-17 PROCEDURE — 99284 EMERGENCY DEPT VISIT MOD MDM: CPT

## 2022-05-17 RX ORDER — METOCLOPRAMIDE HYDROCHLORIDE 5 MG/ML
10 INJECTION INTRAMUSCULAR; INTRAVENOUS ONCE
Status: COMPLETED | OUTPATIENT
Start: 2022-05-17 | End: 2022-05-17

## 2022-05-17 RX ORDER — DIPHENHYDRAMINE HYDROCHLORIDE 50 MG/ML
25 INJECTION INTRAMUSCULAR; INTRAVENOUS ONCE
Status: COMPLETED | OUTPATIENT
Start: 2022-05-17 | End: 2022-05-17

## 2022-05-17 RX ORDER — BUTALBITAL, ACETAMINOPHEN AND CAFFEINE 50; 325; 40 MG/1; MG/1; MG/1
1 TABLET ORAL EVERY 6 HOURS PRN
Qty: 20 TABLET | Refills: 0 | Status: SHIPPED | OUTPATIENT
Start: 2022-05-17

## 2022-05-17 RX ORDER — KETOROLAC TROMETHAMINE 30 MG/ML
30 INJECTION, SOLUTION INTRAMUSCULAR; INTRAVENOUS ONCE
Status: COMPLETED | OUTPATIENT
Start: 2022-05-17 | End: 2022-05-17

## 2022-05-17 RX ORDER — 0.9 % SODIUM CHLORIDE 0.9 %
1000 INTRAVENOUS SOLUTION INTRAVENOUS ONCE
Status: COMPLETED | OUTPATIENT
Start: 2022-05-17 | End: 2022-05-17

## 2022-05-17 RX ADMIN — KETOROLAC TROMETHAMINE 30 MG: 30 INJECTION, SOLUTION INTRAMUSCULAR at 21:43

## 2022-05-17 RX ADMIN — SODIUM CHLORIDE 1000 ML: 9 INJECTION, SOLUTION INTRAVENOUS at 20:59

## 2022-05-17 RX ADMIN — METOCLOPRAMIDE 10 MG: 5 INJECTION, SOLUTION INTRAMUSCULAR; INTRAVENOUS at 21:01

## 2022-05-17 RX ADMIN — DIPHENHYDRAMINE HYDROCHLORIDE 25 MG: 50 INJECTION, SOLUTION INTRAMUSCULAR; INTRAVENOUS at 21:01

## 2022-05-17 ASSESSMENT — PAIN - FUNCTIONAL ASSESSMENT: PAIN_FUNCTIONAL_ASSESSMENT: 0-10

## 2022-05-17 ASSESSMENT — PAIN SCALES - GENERAL
PAINLEVEL_OUTOF10: 6
PAINLEVEL_OUTOF10: 8

## 2022-05-18 NOTE — ED PROVIDER NOTES
[address] [place] [address] ED  [facility]      Pt Name: Dane Kennedy  MRN: [id number]  [place] 2000  Date ofevaluation: 5/17/2022  Provider: Lucas Gaviria PA-C    CHIEF COMPLAINT       Chief Complaint   Patient presents with    Headache     R side back of head, x3 days, hx of aneurysm          HISTORY OF PRESENT ILLNESS  (Location/Symptom, Timing/Onset, Context/Setting, Quality, Duration, Modifying Factors, Severity.)   Dane Kennedy is a 24 y.o. female who presents to the emergency department with headache to the posterior right side of head. Reports history of aneurysm. Headache of 1 over the last few days. Does not feel like her previous headache associated with aneurysm. Has not tried any medication for this at home. Negative for alleviating or aggravating factors. No photophobia present. Photophobia was present with her aneurysm in the past      Nursing Notes were reviewed. ALLERGIES     Latex    CURRENT MEDICATIONS       Previous Medications    ASPIRIN LOW DOSE 81 MG EC TABLET    TAKE ONE TABLET BY MOUTH DAILY    BUPROPION (WELLBUTRIN SR) 100 MG EXTENDED RELEASE TABLET    TAKE ONE TABLET BY MOUTH TWICE A DAY    GABAPENTIN (NEURONTIN) 300 MG CAPSULE    Take 300 mg by mouth. Daily as needed.  Rare use    GALCANEZUMAB-GNLM (EMGALITY) 120 MG/ML SOAJ    Inject 120 mg into the skin    LORATADINE (CLARITIN) 10 MG TABLET    Take 1 tablet by mouth nightly    MAGNESIUM OXIDE (MAG-OX) 400 (241.3 MG) MG TABS TABLET    TAKE ONE TABLET BY MOUTH DAILY FOR MIGRAINE    METFORMIN (GLUCOPHAGE-XR) 500 MG EXTENDED RELEASE TABLET    Take 1 tablet by mouth daily (with breakfast)    MONTELUKAST (SINGULAIR) 10 MG TABLET    TAKE ONE TABLET BY MOUTH ONCE NIGHTLY    NORGESTIM-ETH ESTRAD TRIPHASIC (TRI-SPRINTEC) 0.18/0.215/0.25 MG-35 MCG TABS    Take 1 tablet by mouth daily    RIMEGEPANT SULFATE (NURTEC) 75 MG TBDP    Take 1 tablet by mouth as needed    SERTRALINE (ZOLOFT) 50 MG TABLET Take 1 tablet by mouth daily    VITAMIN B-12 (CYANOCOBALAMIN) 1000 MCG TABLET    Take 1 tablet by mouth daily       PAST MEDICAL HISTORY         Diagnosis Date    Aneurysm of internal carotid artery     Anxiety     Asthma     Carotid aneurysm, left (HCC)     Headache     History of angiography 2021    CEREBRAL ANGIOGRAM    Moderate episode of recurrent major depressive disorder ([facility] [address] [address] 2019       SURGICAL HISTORY           Procedure Laterality Date    CARDIAC CATHETERIZATION  2020    no stent here in tcc.  EYE SURGERY      lazy eye    OTHER SURGICAL HISTORY  2020    cerebral angiogram, stent placed         FAMILY HISTORY           Problem Relation Age of Onset    Diabetes Maternal Cousin     No Known Problems Mother     Anemia Father     Breast Cancer Paternal Grandmother     Lung Cancer Paternal Grandfather     Heart Disease Sister     Other Paternal Aunt         cerebral aneurysm     Family Status   Relation Name Status    MCousin  Alive    Mother  Alive    Father  Alive    PGM      PGF      Sister  Alive    PAunt          SOCIAL HISTORY      reports that she has been smoking e-cigarettes. She has been smoking about 0.25 packs per day. She has never used smokeless tobacco. She reports previous alcohol use. She reports that she does not use drugs. REVIEW OFSYSTEMS    (2-9 systems for level 4, 10 or more for level 5)   Review of Systems    Except as noted above the remainder of the review of systems was reviewed and negative. PHYSICAL EXAM    (up to 7 for level 4, 8 or more for level 5)     ED Triage Vitals [22]   BP Temp Temp Source Pulse Resp SpO2 Height Weight   128/79 98.1 °F (36.7 °C) Oral 109 18 100 % 5' 2\" (1.575 m) 200 lb (90.7 kg)      Physical Exam  Constitutional:       Appearance: She is well-developed. HENT:      Head: Normocephalic and atraumatic.    Cardiovascular:      Rate and Rhythm: Normal rate and regular

## 2022-05-23 LAB — HCG, PREGNANCY URINE (POC): NEGATIVE

## 2022-05-29 ENCOUNTER — HOSPITAL ENCOUNTER (EMERGENCY)
Age: 22
Discharge: HOME | End: 2022-05-29
Attending: EMERGENCY MEDICINE
Payer: MEDICARE

## 2022-05-29 VITALS
HEIGHT: 62 IN | RESPIRATION RATE: 16 BRPM | TEMPERATURE: 98.1 F | BODY MASS INDEX: 36.8 KG/M2 | OXYGEN SATURATION: 100 % | SYSTOLIC BLOOD PRESSURE: 125 MMHG | WEIGHT: 200 LBS | DIASTOLIC BLOOD PRESSURE: 88 MMHG | HEART RATE: 96 BPM

## 2022-05-29 DIAGNOSIS — K64.4 EXTERNAL HEMORRHOID: ICD-10-CM

## 2022-05-29 DIAGNOSIS — K62.5 RECTAL BLEEDING: Primary | ICD-10-CM

## 2022-05-29 LAB
ABSOLUTE EOS #: 0.1 K/UL (ref 0–0.44)
ABSOLUTE IMMATURE GRANULOCYTE: 0.01 K/UL (ref 0–0.3)
ABSOLUTE LYMPH #: 2.77 K/UL (ref 1.1–3.7)
ABSOLUTE MONO #: 0.39 K/UL (ref 0.1–1.2)
ALBUMIN SERPL-MCNC: 4.1 G/DL (ref 3.5–5.2)
ALP BLD-CCNC: 61 U/L (ref 35–104)
ALT SERPL-CCNC: 19 U/L (ref 5–33)
ANION GAP SERPL CALCULATED.3IONS-SCNC: 12 MMOL/L (ref 9–17)
AST SERPL-CCNC: 22 U/L
BASOPHILS # BLD: 1 % (ref 0–2)
BASOPHILS ABSOLUTE: 0.04 K/UL (ref 0–0.2)
BILIRUB SERPL-MCNC: 0.2 MG/DL (ref 0.3–1.2)
BUN BLDV-MCNC: 10 MG/DL (ref 6–20)
BUN/CREAT BLD: 14 (ref 9–20)
CALCIUM SERPL-MCNC: 9 MG/DL (ref 8.6–10.4)
CHLORIDE BLD-SCNC: 102 MMOL/L (ref 98–107)
CO2: 23 MMOL/L (ref 20–31)
CREAT SERPL-MCNC: 0.71 MG/DL (ref 0.5–0.9)
DATE, STOOL #1: NORMAL
EOSINOPHILS RELATIVE PERCENT: 1 % (ref 1–4)
GFR AFRICAN AMERICAN: >60 ML/MIN
GFR NON-AFRICAN AMERICAN: >60 ML/MIN
GFR SERPL CREATININE-BSD FRML MDRD: ABNORMAL ML/MIN/{1.73_M2}
GLUCOSE BLD-MCNC: 96 MG/DL (ref 70–99)
HCT VFR BLD CALC: 39.6 % (ref 36.3–47.1)
HEMOCCULT SP1 STL QL: NEGATIVE
HEMOGLOBIN: 12.5 G/DL (ref 11.9–15.1)
IMMATURE GRANULOCYTES: 0 %
LIPASE: 11 U/L (ref 13–60)
LYMPHOCYTES # BLD: 38 % (ref 24–43)
MCH RBC QN AUTO: 28 PG (ref 25.2–33.5)
MCHC RBC AUTO-ENTMCNC: 31.6 G/DL (ref 28.4–34.8)
MCV RBC AUTO: 88.6 FL (ref 82.6–102.9)
MONOCYTES # BLD: 5 % (ref 3–12)
NRBC AUTOMATED: 0 PER 100 WBC
PDW BLD-RTO: 12.5 % (ref 11.8–14.4)
PLATELET # BLD: 268 K/UL (ref 138–453)
PMV BLD AUTO: 11 FL (ref 8.1–13.5)
POTASSIUM SERPL-SCNC: 4 MMOL/L (ref 3.7–5.3)
RBC # BLD: 4.47 M/UL (ref 3.95–5.11)
SEG NEUTROPHILS: 55 % (ref 36–65)
SEGMENTED NEUTROPHILS ABSOLUTE COUNT: 3.99 K/UL (ref 1.5–8.1)
SODIUM BLD-SCNC: 137 MMOL/L (ref 135–144)
TIME, STOOL #1: 1046
TOTAL PROTEIN: 6.9 G/DL (ref 6.4–8.3)
WBC # BLD: 7.3 K/UL (ref 3.5–11.3)

## 2022-05-29 PROCEDURE — 99283 EMERGENCY DEPT VISIT LOW MDM: CPT

## 2022-05-29 PROCEDURE — 6370000000 HC RX 637 (ALT 250 FOR IP): Performed by: EMERGENCY MEDICINE

## 2022-05-29 PROCEDURE — 83690 ASSAY OF LIPASE: CPT

## 2022-05-29 PROCEDURE — 82270 OCCULT BLOOD FECES: CPT

## 2022-05-29 PROCEDURE — 80053 COMPREHEN METABOLIC PANEL: CPT

## 2022-05-29 PROCEDURE — 85025 COMPLETE CBC W/AUTO DIFF WBC: CPT

## 2022-05-29 RX ORDER — DICYCLOMINE HYDROCHLORIDE 10 MG/1
10 CAPSULE ORAL ONCE
Status: COMPLETED | OUTPATIENT
Start: 2022-05-29 | End: 2022-05-29

## 2022-05-29 RX ADMIN — DICYCLOMINE HYDROCHLORIDE 10 MG: 10 CAPSULE ORAL at 10:58

## 2022-05-29 ASSESSMENT — ENCOUNTER SYMPTOMS
BLOOD IN STOOL: 1
BACK PAIN: 0
VOMITING: 0
CONSTIPATION: 0
DIARRHEA: 0
SHORTNESS OF BREATH: 0
ANAL BLEEDING: 1
ABDOMINAL PAIN: 1
NAUSEA: 0

## 2022-05-29 ASSESSMENT — PAIN SCALES - GENERAL
PAINLEVEL_OUTOF10: 8
PAINLEVEL_OUTOF10: 8

## 2022-05-29 ASSESSMENT — PAIN DESCRIPTION - DESCRIPTORS: DESCRIPTORS: CRAMPING

## 2022-05-29 ASSESSMENT — PAIN - FUNCTIONAL ASSESSMENT: PAIN_FUNCTIONAL_ASSESSMENT: 0-10

## 2022-05-29 ASSESSMENT — PAIN DESCRIPTION - LOCATION: LOCATION: ABDOMEN

## 2022-05-29 ASSESSMENT — PAIN DESCRIPTION - FREQUENCY: FREQUENCY: CONTINUOUS

## 2022-05-29 NOTE — ED PROVIDER NOTES
[facility]  Emergency Department Encounter     Pt Name: Juanito Mehta  MRN: [id number]  [place] 2000  Date of evaluation: 5/29/22  PCP:  Jessica Albright PA-C    CHIEF COMPLAINT       Chief Complaint   Patient presents with    Abdominal Pain     onset this am (cramping)    Rectal Bleeding     states black and tarry stools       HISTORY OF PRESENT ILLNESS  (Location/Symptom, Timing/Onset, Context/Setting, Quality, Duration, Modifying Factors, Severity.)    Juanito Mehta is a 24 y.o. female who presents with lower abdominal cramping associated with bright red blood in the toilet bowl and when she was wiping as well as dark tarry stools. Is never happened her before. Does have familial history of Crohn's disease which is what prompted her to come the emergency department because she was worried. No changes in her appetite. No nausea or vomiting. Denies being constipated recently. Denies any alcohol use. No habitual use of NSAIDs. No history of GI bleeds in the past.  She has not any blood thinners. Does take a baby aspirin daily. No history of coagulopathies. PAST MEDICAL / SURGICAL / SOCIAL / FAMILY HISTORY    has a past medical history of Aneurysm of internal carotid artery, Anxiety, Asthma, Carotid aneurysm, left ([address] Headache, History of angiography, and Moderate episode of recurrent major depressive disorder ([facility] [address] has a past surgical history that includes Eye surgery; other surgical history (12/16/2020); and Cardiac catheterization (12/17/2020).     Social History     Socioeconomic History    Marital status: Single     Spouse name: Not on file    Number of children: Not on file    Years of education: Not on file    Highest education level: Not on file   Occupational History    Not on file   Tobacco Use    Smoking status: Current Some Day Smoker     Packs/day: 0.25     Types: E-Cigarettes    Smokeless tobacco: Never Used Vaping Use    Vaping Use: Every day   Substance and Sexual Activity    Alcohol use: Not Currently    Drug use: No    Sexual activity: Yes     Partners: Male   Other Topics Concern    Not on file   Social History Narrative    Not on file     Social Determinants of Health     Financial Resource Strain:     Difficulty of Paying Living Expenses: Not on file   Food Insecurity:     Worried About Running Out of Food in the Last Year: Not on file    Halley of Food in the Last Year: Not on file   Transportation Needs:     Lack of Transportation (Medical): Not on file    Lack of Transportation (Non-Medical): Not on file   Physical Activity:     Days of Exercise per Week: Not on file    Minutes of Exercise per Session: Not on file   Stress:     Feeling of Stress : Not on file   Social Connections:     Frequency of Communication with Friends and Family: Not on file    Frequency of Social Gatherings with Friends and Family: Not on file    Attends Yarsani Services: Not on file    Active Member of [address] [place] [address] or Organizations: Not on file    Attends Club or Organization Meetings: Not on file    Marital Status: Not on file   Intimate Partner Violence:     Fear of Current or Ex-Partner: Not on file    Emotionally Abused: Not on file    Physically Abused: Not on file    Sexually Abused: Not on file   Housing Stability:     Unable to Pay for Housing in the Last Year: Not on file    Number of [place] in the Last Year: Not on file    Unstable Housing in the Last Year: Not on file       Family History   Problem Relation Age of Onset    Diabetes Maternal Cousin     No Known Problems Mother     Anemia Father     Breast Cancer Paternal Grandmother     Lung Cancer Paternal Grandfather     Heart Disease Sister     Other Paternal Aunt         cerebral aneurysm       Allergies:    Latex    Home Medications:  Prior to Admission medications    Medication Sig Start Date End Date Taking?  Authorizing Provider and vomiting. Genitourinary: Negative for flank pain. Musculoskeletal: Negative for back pain. Neurological: Negative for dizziness, syncope, weakness and light-headedness. Hematological: Does not bruise/bleed easily. PHYSICAL EXAM   (up to 7 for level 4, 8 or more for level 5)    VITALS:   Vitals:    05/29/22 1038   BP: 125/88   Pulse: 96   Resp: 16   Temp: 98.1 °F (36.7 °C)   TempSrc: Oral   SpO2: 100%   Weight: 200 lb (90.7 kg)   Height: 5' 2\" (1.575 m)       Physical Exam  Vitals and nursing note reviewed. Exam conducted with a chaperone present. Constitutional:       General: She is not in acute distress. Appearance: She is well-developed. She is obese. She is not diaphoretic. HENT:      Head: Normocephalic and atraumatic. Eyes:      Conjunctiva/sclera: Conjunctivae normal.   Cardiovascular:      Rate and Rhythm: Normal rate and regular rhythm. Heart sounds: Normal heart sounds. Pulmonary:      Effort: Pulmonary effort is normal. No respiratory distress. Breath sounds: Normal breath sounds. No wheezing, rhonchi or rales. Abdominal:      General: There is no distension. Palpations: Abdomen is soft. Tenderness: There is no abdominal tenderness. There is no guarding or rebound. Genitourinary:     Rectum: External hemorrhoid and internal hemorrhoid present. No mass, tenderness or anal fissure. Normal anal tone. Musculoskeletal:         General: Normal range of motion. Cervical back: Normal range of motion. Skin:     General: Skin is warm and dry. Coloration: Skin is not pale. Neurological:      General: No focal deficit present. Mental Status: She is alert.    Psychiatric:         Behavior: Behavior normal.         DIFFERENTIAL  DIAGNOSIS   PLAN (LABS / IMAGING / EKG):  Orders Placed This Encounter   Procedures    Occ Bld, Fecal Scrn    CBC with Auto Differential    Comprehensive Metabolic Panel w/ Reflex to MG    Lipase       MEDICATIONS ORDERED:  Orders Placed This Encounter   Medications    dicyclomine (BENTYL) capsule 10 mg     DIAGNOSTIC RESULTS / EMERGENCYDEPARTMENT COURSE / MDM   LABS:  Labs Reviewed   COMPREHENSIVE METABOLIC PANEL W/ REFLEX TO MG FOR LOW K - Abnormal; Notable for the following components:       Result Value    Total Bilirubin 0.20 (*)     All other components within normal limits   LIPASE - Abnormal; Notable for the following components:    Lipase 11 (*)     All other components within normal limits   OCCULT BLOOD SCREEN   CBC WITH AUTO DIFFERENTIAL       RADIOLOGY:  No results found.     EMERGENCY DEPARTMENT COURSE:  ED Course as of 05/29/22 1121   Sun May 29, 2022   1106 Occ Bld, Fecal Scrn:    Occult Blood, Stool #1 NEGATIVE   Date, Stool #1 3,907,233   Time, Stool #1 1,046 [AO]   1106 CBC with Auto Differential:    WBC 7.3   RBC 4.47   Hemoglobin Quant 12.5   Hematocrit 39.6   MCV 88.6   MCH 28.0   MCHC 31.6   RDW 12.5   Platelet Count 882   MPV 11.0   NRBC Automated 0.0   Seg Neutrophils 55   Lymphocytes 38   Monocytes 5   Eosinophils % 1   Basophils 1   Immature Granulocytes 0   Segs Absolute 3.99   Absolute Lymph # 2.77   Absolute Mono # 0.39   Absolute Eos # 0.10   Basophils Absolute 0.04   Absolute Immature Granulocyte 0.01 [AO]   1119 Comprehensive Metabolic Panel w/ Reflex to MG(!):    GLUCOSE, FASTING,GF 96   BUN,BUNPL 10   Creatinine 0.71   Bun/Cre Ratio 14   CALCIUM, SERUM, 099902 9.0   Sodium 137   Potassium 4.0   Chloride 102   CO2 23   Anion Gap 12   Alk Phos 61   ALT 19   AST 22   Bilirubin 0.20(!)   Total Protein 6.9   Albumin 4.1   GFR Non- >60   GFR  >60   GFR Comment      [AO]   1119 Lipase(!):    Lipase 11(!) [AO]      ED Course User Index  [AO] Shad Lucas 1721, DO       MDM  Number of Diagnoses or Management Options     Amount and/or Complexity of Data Reviewed  Clinical lab tests: ordered and reviewed  Review and summarize past medical records: yes    Patient Progress  Patient progress: stable      PROCEDURES:  Procedures     CONSULTS:  None    CRITICAL CARE:  NONE    FINAL IMPRESSION     1. Rectal bleeding    2. External hemorrhoid       DISPOSITION / PLAN   DISPOSITION Decision To Discharge 05/29/2022 11:20:12 AM      Evaluation and treatment course in the ED, and plan of care upon discharge was discussed in length with the patient. Patient had no further questions prior to being discharged and was instructed to return to the ED for new or worsening symptoms. Any changes to existing medications or new prescriptions were reviewed with patient and they expressed understanding of how to correctly take their medications and the possible side effects. PATIENT REFERRED TO:  Becky Lopez PA-C  [address] [place] [address]  [address]  [phone]    As needed, If symptoms worsen    Gurdeep Bella MD  [facility] [place] [place] [address]  [phone]    Schedule an appointment as soon as possible for a visit   As needed for GI follow up      DISCHARGE MEDICATIONS:  New Prescriptions    No medications on file       Meaghan Travis DO  Emergency Medicine Physician    (Please note that portions of this note were completed with a voice recognition program.  Efforts were made to edit the dictations but occasionally words are mis-transcribed.)        Shad Lucas 1721, DO  05/29/22 1121

## 2022-06-02 ENCOUNTER — PATIENT MESSAGE (OUTPATIENT)
Dept: FAMILY MEDICINE CLINIC | Age: 22
End: 2022-06-02

## 2022-06-02 DIAGNOSIS — E53.8 B12 DEFICIENCY: ICD-10-CM

## 2022-06-02 RX ORDER — LANOLIN ALCOHOL/MO/W.PET/CERES
1000 CREAM (GRAM) TOPICAL DAILY
Qty: 30 TABLET | Refills: 6 | Status: SHIPPED | OUTPATIENT
Start: 2022-06-02

## 2022-06-02 NOTE — TELEPHONE ENCOUNTER
From: Juve Going  To: Andreia Ren  Sent: 6/2/2022 3:16 PM EDT  Subject: Refill    I requested refills on my Vitamin B-12 over 2 weeks ago I have been out of it for over a week now, whats going on?

## 2022-07-21 RX ORDER — BUPROPION HYDROCHLORIDE 100 MG/1
TABLET, EXTENDED RELEASE ORAL
Qty: 60 TABLET | Refills: 3 | Status: SHIPPED | OUTPATIENT
Start: 2022-07-21

## 2022-08-12 ENCOUNTER — HOSPITAL ENCOUNTER (EMERGENCY)
Age: 22
Discharge: HOME | End: 2022-08-13
Attending: EMERGENCY MEDICINE
Payer: MEDICARE

## 2022-08-12 ENCOUNTER — APPOINTMENT (OUTPATIENT)
Dept: CT IMAGING | Age: 22
End: 2022-08-12
Payer: MEDICARE

## 2022-08-12 VITALS
SYSTOLIC BLOOD PRESSURE: 129 MMHG | WEIGHT: 195 LBS | RESPIRATION RATE: 16 BRPM | DIASTOLIC BLOOD PRESSURE: 89 MMHG | HEIGHT: 62 IN | BODY MASS INDEX: 35.88 KG/M2 | TEMPERATURE: 98.1 F | OXYGEN SATURATION: 100 % | HEART RATE: 100 BPM

## 2022-08-12 DIAGNOSIS — G43.909 MIGRAINE WITHOUT STATUS MIGRAINOSUS, NOT INTRACTABLE, UNSPECIFIED MIGRAINE TYPE: Primary | ICD-10-CM

## 2022-08-12 LAB
CHP ED QC CHECK: NORMAL
PREGNANCY TEST URINE, POC: NEGATIVE

## 2022-08-12 PROCEDURE — 99284 EMERGENCY DEPT VISIT MOD MDM: CPT

## 2022-08-12 PROCEDURE — 36415 COLL VENOUS BLD VENIPUNCTURE: CPT

## 2022-08-12 PROCEDURE — 96375 TX/PRO/DX INJ NEW DRUG ADDON: CPT

## 2022-08-12 PROCEDURE — 96374 THER/PROPH/DIAG INJ IV PUSH: CPT

## 2022-08-12 PROCEDURE — 6360000002 HC RX W HCPCS: Performed by: PHYSICIAN ASSISTANT

## 2022-08-12 PROCEDURE — 70450 CT HEAD/BRAIN W/O DYE: CPT

## 2022-08-12 RX ORDER — 0.9 % SODIUM CHLORIDE 0.9 %
1000 INTRAVENOUS SOLUTION INTRAVENOUS ONCE
Status: DISCONTINUED | OUTPATIENT
Start: 2022-08-12 | End: 2022-08-13 | Stop reason: HOSPADM

## 2022-08-12 RX ORDER — ATOGEPANT 30 MG/1
30 TABLET ORAL DAILY
COMMUNITY
Start: 2022-06-01

## 2022-08-12 RX ORDER — BACLOFEN 10 MG/1
1 TABLET ORAL DAILY
COMMUNITY
Start: 2022-08-01 | End: 2022-10-21

## 2022-08-12 RX ORDER — TRAZODONE HYDROCHLORIDE 50 MG/1
1 TABLET ORAL DAILY
COMMUNITY
Start: 2022-07-21

## 2022-08-12 RX ORDER — RIBOFLAVIN (VITAMIN B2) 400 MG
400 TABLET ORAL DAILY
COMMUNITY
Start: 2022-06-03

## 2022-08-12 RX ORDER — LEVETIRACETAM 250 MG/1
1 TABLET ORAL DAILY
COMMUNITY
Start: 2022-07-03

## 2022-08-12 RX ORDER — VENLAFAXINE HYDROCHLORIDE 37.5 MG/1
1 CAPSULE, EXTENDED RELEASE ORAL DAILY
COMMUNITY
Start: 2022-08-01 | End: 2022-10-21 | Stop reason: DRUGHIGH

## 2022-08-12 RX ORDER — DIPHENHYDRAMINE HYDROCHLORIDE 50 MG/ML
25 INJECTION INTRAMUSCULAR; INTRAVENOUS ONCE
Status: COMPLETED | OUTPATIENT
Start: 2022-08-12 | End: 2022-08-12

## 2022-08-12 RX ORDER — METOCLOPRAMIDE HYDROCHLORIDE 5 MG/ML
10 INJECTION INTRAMUSCULAR; INTRAVENOUS ONCE
Status: COMPLETED | OUTPATIENT
Start: 2022-08-12 | End: 2022-08-12

## 2022-08-12 RX ADMIN — METOCLOPRAMIDE HYDROCHLORIDE 10 MG: 5 INJECTION INTRAMUSCULAR; INTRAVENOUS at 23:23

## 2022-08-12 RX ADMIN — DIPHENHYDRAMINE HYDROCHLORIDE 25 MG: 50 INJECTION, SOLUTION INTRAMUSCULAR; INTRAVENOUS at 23:23

## 2022-08-12 ASSESSMENT — PAIN - FUNCTIONAL ASSESSMENT: PAIN_FUNCTIONAL_ASSESSMENT: 0-10

## 2022-08-12 ASSESSMENT — PAIN SCALES - GENERAL: PAINLEVEL_OUTOF10: 10

## 2022-08-13 PROCEDURE — 96375 TX/PRO/DX INJ NEW DRUG ADDON: CPT

## 2022-08-13 PROCEDURE — 6360000002 HC RX W HCPCS: Performed by: PHYSICIAN ASSISTANT

## 2022-08-13 RX ORDER — KETOROLAC TROMETHAMINE 30 MG/ML
30 INJECTION, SOLUTION INTRAMUSCULAR; INTRAVENOUS ONCE
Status: COMPLETED | OUTPATIENT
Start: 2022-08-13 | End: 2022-08-13

## 2022-08-13 RX ADMIN — KETOROLAC TROMETHAMINE 30 MG: 30 INJECTION, SOLUTION INTRAMUSCULAR at 00:33

## 2022-08-13 NOTE — ED PROVIDER NOTES
eMERGENCY dEPARTMENT eNCOUnter   Independent Attestation     Pt Name: Mayela West  MRN: [id number]  [place] 2000  Date of evaluation: 8/13/22     Mayela West is a 24 y.o. female with CC: Headache (History of migraines. X5 days) and Dizziness        This visit was performed by both a physician and an APC. I performed all aspects of the MDM as documented.       The care is provided during an unprecedented national emergency due to the novel coronavirus, Bibiana Nuñez MD  Attending Emergency Physician           Michael Stokes MD  08/13/22 7692

## 2022-08-13 NOTE — ED PROVIDER NOTES
[address] [place] [address] ED  [facility]      Pt Name: Rafael Armenta  MRN: [id number]  [place] 2000  Date ofevaluation: 8/12/2022  Provider: Elkin Acuna PA-C    CHIEF COMPLAINT       Chief Complaint   Patient presents with    Headache     History of migraines. X5 days    Dizziness         HISTORY OF PRESENT ILLNESS  (Location/Symptom, Timing/Onset, Context/Setting, Quality, Duration, Modifying Factors, Severity.)   Rafael Armenta is a 24 y.o. female who presents to the emergency department with migraine for the last 5 days. Reports some dizziness as well. History of aneurysm. Patient wants to make sure that there is no recurrence of aneurysm given her past medical history. Nursing Notes were reviewed. ALLERGIES     Latex    CURRENT MEDICATIONS       Previous Medications    ASPIRIN LOW DOSE 81 MG EC TABLET    TAKE ONE TABLET BY MOUTH DAILY    ATOGEPANT (QULIPTA) 30 MG TABS    Take 30 mg by mouth daily    BACLOFEN (LIORESAL) 10 MG TABLET    Take 1 tablet by mouth in the morning. BUPROPION (WELLBUTRIN SR) 100 MG EXTENDED RELEASE TABLET    TAKE ONE TABLET BY MOUTH TWICE A DAY    BUTALBITAL-ACETAMINOPHEN-CAFFEINE (ESGIC) -40 MG PER TABLET    Take 1 tablet by mouth every 6 hours as needed for Headaches    CHOLECALCIFEROL 50 MCG (2000 UT) TABS    Take 2,000 Units by mouth in the morning. GABAPENTIN (NEURONTIN) 300 MG CAPSULE    Take 300 mg by mouth. Daily as needed. Rare use    GALCANEZUMAB-GNLM (EMGALITY) 120 MG/ML SOAJ    Inject 120 mg into the skin    LEVETIRACETAM (KEPPRA) 250 MG TABLET    Take 1 tablet by mouth in the morning.     LORATADINE (CLARITIN) 10 MG TABLET    Take 1 tablet by mouth nightly    MAGNESIUM OXIDE (MAG-OX) 400 (241.3 MG) MG TABS TABLET    TAKE ONE TABLET BY MOUTH DAILY FOR MIGRAINE    METFORMIN (GLUCOPHAGE-XR) 500 MG EXTENDED RELEASE TABLET    Take 1 tablet by mouth daily (with breakfast)    MONTELUKAST (SINGULAIR) 10 MG TABLET    TAKE noted above the remainder of the review of systems was reviewed and negative. PHYSICAL EXAM    (up to 7 for level 4, 8 or more for level 5)     ED Triage Vitals   BP Temp Temp Source Heart Rate Resp SpO2 Height Weight   08/12/22 2235 08/12/22 2239 08/12/22 2239 08/12/22 2235 08/12/22 2235 08/12/22 2235 08/12/22 2235 08/12/22 2235   129/89 98.1 °F (36.7 °C) Oral 100 16 100 % 5' 2\" (1.575 m) 195 lb (88.5 kg)      Physical Exam  HENT:      Head: Normocephalic and atraumatic. Eyes:      Extraocular Movements: Extraocular movements intact. Pupils: Pupils are equal, round, and reactive to light. Cardiovascular:      Rate and Rhythm: Normal rate and regular rhythm. Abdominal:      Tenderness: There is no abdominal tenderness. Musculoskeletal:         General: Normal range of motion. Cervical back: Neck supple. Skin:     General: Skin is warm. Findings: No rash. Neurological:      General: No focal deficit present. Mental Status: She is alert. GCS: GCS eye subscore is 4. GCS verbal subscore is 5. GCS motor subscore is 6. Cranial Nerves: Cranial nerves are intact. Sensory: Sensation is intact. Motor: Motor function is intact. Coordination: Coordination is intact.    Psychiatric:         Mood and Affect: Mood normal.               DIAGNOSTIC RESULTS     EKG: All EKG's are interpreted by the Emergency Department Physician who either signs or Co-signs this chart in the absence of a cardiologist.        RADIOLOGY:   Non-plain film images such as CT, Ultrasound and MRI are read by the radiologist. Plain radiographic images arevisualized and preliminarily interpreted by the emergency physician with the below findings:        Interpretation per the Radiologist below, if available at thetime of this note:          ED BEDSIDE ULTRASOUND:   Performed by ED Physician - none    LABS:  Labs Reviewed   POCT URINE PREGNANCY - Normal       All other labs were within normal range or not returned as of this dictation. EMERGENCY DEPARTMENT COURSE and DIFFERENTIAL DIAGNOSIS/MDM:   Vitals:    Vitals:    08/12/22 2235 08/12/22 2239   BP: 129/89    Pulse: 100    Resp: 16    Temp:  98.1 °F (36.7 °C)   TempSrc:  Oral   SpO2: 100%    Weight: 195 lb (88.5 kg)    Height: 5' 2\" (1.575 m)      Will DC home. Ct negative. CONSULTS:  None    PROCEDURES:  Procedures        FINAL IMPRESSION      1. Migraine without status migrainosus, not intractable, unspecified migraine type          DISPOSITION/PLAN   DISPOSITION Decision To Discharge 08/13/2022 12:15:39 AM      PATIENTREFERRED TO:   Keisha Delatorre PA-C  [address]   [facility] [address]  [phone]    In 3 days      DISCHARGE MEDICATIONS:     New Prescriptions    No medications on file           (Please note that portions of this note were completed with a voice recognition program.  Efforts were made to edit thedictations but occasionally words are mis-transcribed.)    LEANN Farrar PA-C  08/13/22 0020

## 2022-08-17 ENCOUNTER — PATIENT MESSAGE (OUTPATIENT)
Dept: OBGYN CLINIC | Age: 22
End: 2022-08-17

## 2022-08-17 NOTE — TELEPHONE ENCOUNTER
From: Brigitte Borges  To: Nicho Paredes  Sent: 8/17/2022 4:16 PM EDT  Subject: Sugar pills    For the last 3 months when I take my sugar pills I get bad headaches. I have had my brain aneurysm checked and there is no change and I rarely get headaches anymore but whenever I start taking the sugar pills I get them. Is there anything you can do? I would appreciate it, thank you.

## 2022-08-18 RX ORDER — NORGESTIMATE AND ETHINYL ESTRADIOL 0.25-0.035
1 KIT ORAL DAILY
Qty: 28 TABLET | Refills: 3 | Status: SHIPPED | OUTPATIENT
Start: 2022-08-18 | End: 2022-10-31

## 2022-08-24 ENCOUNTER — PATIENT MESSAGE (OUTPATIENT)
Dept: FAMILY MEDICINE CLINIC | Age: 22
End: 2022-08-24

## 2022-08-24 RX ORDER — ASPIRIN 81 MG/1
TABLET ORAL
Qty: 30 TABLET | Refills: 5 | Status: SHIPPED | OUTPATIENT
Start: 2022-08-24 | End: 2022-09-26 | Stop reason: ALTCHOICE

## 2022-09-07 DIAGNOSIS — E88.81 INSULIN RESISTANCE: ICD-10-CM

## 2022-09-08 RX ORDER — METFORMIN HYDROCHLORIDE 500 MG/1
TABLET, EXTENDED RELEASE ORAL
Qty: 30 TABLET | Refills: 3 | Status: SHIPPED | OUTPATIENT
Start: 2022-09-08

## 2022-09-25 RX ORDER — LORATADINE 10 MG/1
TABLET ORAL
Qty: 30 TABLET | Refills: 6 | Status: SHIPPED | OUTPATIENT
Start: 2022-09-25

## 2022-09-26 RX ORDER — ASPIRIN 81 MG/1
TABLET, COATED ORAL
Qty: 30 TABLET | Refills: 5 | Status: SHIPPED | OUTPATIENT
Start: 2022-09-26

## 2022-09-28 ENCOUNTER — HOSPITAL ENCOUNTER (OUTPATIENT)
Age: 22
Discharge: HOME | End: 2022-09-28
Payer: MEDICARE

## 2022-09-28 DIAGNOSIS — E53.8 B12 DEFICIENCY: ICD-10-CM

## 2022-09-28 DIAGNOSIS — E88.81 INSULIN RESISTANCE: ICD-10-CM

## 2022-09-28 LAB
ALBUMIN SERPL-MCNC: 3.6 G/DL (ref 3.5–5.2)
ALBUMIN/GLOBULIN RATIO: 1.2 (ref 1–2.5)
ALP BLD-CCNC: 47 U/L (ref 35–104)
ALT SERPL-CCNC: 22 U/L (ref 5–33)
ANION GAP SERPL CALCULATED.3IONS-SCNC: 10 MMOL/L (ref 9–17)
AST SERPL-CCNC: 19 U/L
BILIRUB SERPL-MCNC: 0.2 MG/DL (ref 0.3–1.2)
BUN BLDV-MCNC: 7 MG/DL (ref 6–20)
CALCIUM SERPL-MCNC: 8.6 MG/DL (ref 8.6–10.4)
CHLORIDE BLD-SCNC: 107 MMOL/L (ref 98–107)
CO2: 21 MMOL/L (ref 20–31)
CREAT SERPL-MCNC: 0.58 MG/DL (ref 0.5–0.9)
ESTIMATED AVERAGE GLUCOSE: 88 MG/DL
FOLATE: 8.8 NG/ML
GFR AFRICAN AMERICAN: >60 ML/MIN
GFR NON-AFRICAN AMERICAN: >60 ML/MIN
GFR SERPL CREATININE-BSD FRML MDRD: ABNORMAL ML/MIN/{1.73_M2}
GLUCOSE BLD-MCNC: 81 MG/DL (ref 70–99)
HBA1C MFR BLD: 4.7 % (ref 4–6)
INSULIN COMMENT: NORMAL
INSULIN REFERENCE RANGE:: NORMAL
INSULIN: 18.6 MU/L
POTASSIUM SERPL-SCNC: 3.9 MMOL/L (ref 3.7–5.3)
SODIUM BLD-SCNC: 138 MMOL/L (ref 135–144)
TOTAL PROTEIN: 6.5 G/DL (ref 6.4–8.3)
VITAMIN B-12: 901 PG/ML (ref 232–1245)

## 2022-09-28 PROCEDURE — 82607 VITAMIN B-12: CPT

## 2022-09-28 PROCEDURE — 82746 ASSAY OF FOLIC ACID SERUM: CPT

## 2022-09-28 PROCEDURE — 80053 COMPREHEN METABOLIC PANEL: CPT

## 2022-09-28 PROCEDURE — 83525 ASSAY OF INSULIN: CPT

## 2022-09-28 PROCEDURE — 36415 COLL VENOUS BLD VENIPUNCTURE: CPT

## 2022-09-28 PROCEDURE — 83036 HEMOGLOBIN GLYCOSYLATED A1C: CPT

## 2022-09-28 RX ORDER — ASPIRIN 81 MG/1
TABLET ORAL
Qty: 30 TABLET | Refills: 5 | OUTPATIENT
Start: 2022-09-28

## 2022-09-29 PROCEDURE — 96374 THER/PROPH/DIAG INJ IV PUSH: CPT

## 2022-09-29 PROCEDURE — 96375 TX/PRO/DX INJ NEW DRUG ADDON: CPT

## 2022-09-29 PROCEDURE — 99284 EMERGENCY DEPT VISIT MOD MDM: CPT

## 2022-09-29 ASSESSMENT — PAIN DESCRIPTION - LOCATION: LOCATION: HEAD

## 2022-09-29 ASSESSMENT — PAIN - FUNCTIONAL ASSESSMENT: PAIN_FUNCTIONAL_ASSESSMENT: 0-10

## 2022-09-29 ASSESSMENT — PAIN SCALES - GENERAL: PAINLEVEL_OUTOF10: 8

## 2022-09-30 ENCOUNTER — APPOINTMENT (OUTPATIENT)
Dept: CT IMAGING | Age: 22
End: 2022-09-30
Payer: MEDICARE

## 2022-09-30 ENCOUNTER — HOSPITAL ENCOUNTER (EMERGENCY)
Age: 22
Discharge: HOME | End: 2022-09-30
Attending: EMERGENCY MEDICINE
Payer: MEDICARE

## 2022-09-30 VITALS
DIASTOLIC BLOOD PRESSURE: 79 MMHG | HEART RATE: 94 BPM | RESPIRATION RATE: 16 BRPM | TEMPERATURE: 98.4 F | SYSTOLIC BLOOD PRESSURE: 111 MMHG | WEIGHT: 190 LBS | OXYGEN SATURATION: 98 % | BODY MASS INDEX: 34.96 KG/M2 | HEIGHT: 62 IN

## 2022-09-30 DIAGNOSIS — G43.909 MIGRAINE WITHOUT STATUS MIGRAINOSUS, NOT INTRACTABLE, UNSPECIFIED MIGRAINE TYPE: Primary | ICD-10-CM

## 2022-09-30 PROCEDURE — 96374 THER/PROPH/DIAG INJ IV PUSH: CPT

## 2022-09-30 PROCEDURE — 96375 TX/PRO/DX INJ NEW DRUG ADDON: CPT

## 2022-09-30 PROCEDURE — 70450 CT HEAD/BRAIN W/O DYE: CPT

## 2022-09-30 PROCEDURE — 6360000002 HC RX W HCPCS: Performed by: EMERGENCY MEDICINE

## 2022-09-30 RX ORDER — METOCLOPRAMIDE HYDROCHLORIDE 5 MG/ML
10 INJECTION INTRAMUSCULAR; INTRAVENOUS ONCE
Status: COMPLETED | OUTPATIENT
Start: 2022-09-30 | End: 2022-09-30

## 2022-09-30 RX ORDER — DIPHENHYDRAMINE HYDROCHLORIDE 50 MG/ML
25 INJECTION INTRAMUSCULAR; INTRAVENOUS ONCE
Status: COMPLETED | OUTPATIENT
Start: 2022-09-30 | End: 2022-09-30

## 2022-09-30 RX ORDER — KETOROLAC TROMETHAMINE 10 MG/1
10 TABLET, FILM COATED ORAL EVERY 6 HOURS PRN
Qty: 10 TABLET | Refills: 0 | Status: SHIPPED | OUTPATIENT
Start: 2022-09-30

## 2022-09-30 RX ORDER — KETOROLAC TROMETHAMINE 30 MG/ML
30 INJECTION, SOLUTION INTRAMUSCULAR; INTRAVENOUS ONCE
Status: COMPLETED | OUTPATIENT
Start: 2022-09-30 | End: 2022-09-30

## 2022-09-30 RX ORDER — METOCLOPRAMIDE 10 MG/1
10 TABLET ORAL PRN
Qty: 10 TABLET | Refills: 0 | Status: SHIPPED | OUTPATIENT
Start: 2022-09-30

## 2022-09-30 RX ADMIN — METOCLOPRAMIDE 10 MG: 5 INJECTION, SOLUTION INTRAMUSCULAR; INTRAVENOUS at 01:35

## 2022-09-30 RX ADMIN — KETOROLAC TROMETHAMINE 30 MG: 30 INJECTION, SOLUTION INTRAMUSCULAR at 03:13

## 2022-09-30 RX ADMIN — DIPHENHYDRAMINE HYDROCHLORIDE 25 MG: 50 INJECTION, SOLUTION INTRAMUSCULAR; INTRAVENOUS at 01:34

## 2022-09-30 ASSESSMENT — ENCOUNTER SYMPTOMS
APNEA: 0
COLOR CHANGE: 0
CHEST TIGHTNESS: 0
VOMITING: 0
SHORTNESS OF BREATH: 0
ABDOMINAL DISTENTION: 0
DIARRHEA: 0
SINUS PAIN: 0
EYES NEGATIVE: 1
CONSTIPATION: 0

## 2022-09-30 ASSESSMENT — PAIN SCALES - GENERAL: PAINLEVEL_OUTOF10: 6

## 2022-09-30 NOTE — ED PROVIDER NOTES
EMERGENCY DEPARTMENT ENCOUNTER    Pt Name: Marleny Del Valle  MRN: [id number]  [place] 2000  Date of evaluation: 9/30/22  CHIEF COMPLAINT       Chief Complaint   Patient presents with    Headache     Hx migraines     HISTORY OF PRESENT ILLNESS   76-year-old female presents emergency room for headache. Symptoms have been going on for 1 day. Symptoms worsened tonight. Patient has history of coiled cerebral aneurysm and suffers from recurrent migraines and headaches. She had coiling done in 2020 and has had issues with almost daily headaches since then. She follows with neurology. She has been on multiple medications without any of them being particularly effective. She reports significant improvement with migraine cocktail when she has come to the emergency room for her headaches. REVIEW OF SYSTEMS     Review of Systems   Constitutional:  Negative for activity change, chills and diaphoresis. HENT:  Negative for congestion, sinus pain and tinnitus. Eyes: Negative. Respiratory:  Negative for apnea, chest tightness and shortness of breath. Gastrointestinal:  Negative for abdominal distention, constipation, diarrhea and vomiting. Genitourinary:  Negative for difficulty urinating and frequency. Musculoskeletal:  Negative for arthralgias and myalgias. Skin:  Negative for color change and rash. Neurological:  Positive for headaches. Negative for dizziness. Hematological: Negative. Psychiatric/Behavioral: Negative.      PASTMEDICAL HISTORY     Past Medical History:   Diagnosis Date    Aneurysm of internal carotid artery     Anxiety     Asthma     Carotid aneurysm, left (HCC)     Headache     History of angiography 06/24/2021    CEREBRAL ANGIOGRAM    Moderate episode of recurrent major depressive disorder ([facility] [address] 1/7/2019     Past Problem List  Patient Active Problem List   Diagnosis Code    Moderate episode of recurrent major depressive disorder ([facility] [address] F33.1    Obesity (BMI 30.0-34. 9) E66.9    Chronic fatigue R53.82    Moderate persistent asthma without complication I24.07    Hyperglycemia R73.9    Subarachnoid hemorrhage (HCC) I60.9    Aneurysm of internal carotid artery I67.1    S/P cerebral aneurysm repair Z98.890, Z86.79    Aneurysm ([facility] [address] I72.9     SURGICAL HISTORY       Past Surgical History:   Procedure Laterality Date    CARDIAC CATHETERIZATION  12/17/2020    no stent here in [facility]. EYE SURGERY      lazy eye    OTHER SURGICAL HISTORY  12/16/2020    cerebral angiogram, stent placed     CURRENT MEDICATIONS       Discharge Medication List as of 9/30/2022  3:56 AM        CONTINUE these medications which have NOT CHANGED    Details   ASPIRIN LOW DOSE 81 MG EC tablet TAKE ONE TABLET BY MOUTH DAILY, Disp-30 tablet, R-5Normal      loratadine (CLARITIN) 10 MG tablet TAKE ONE TABLET BY MOUTH ONCE NIGHTLY, Disp-30 tablet, R-6Normal      metFORMIN (GLUCOPHAGE-XR) 500 MG extended release tablet TAKE ONE TABLET BY MOUTH DAILY WITH BREAKFAST, Disp-30 tablet, R-3Normal      norgestimate-ethinyl estradiol (SPRINTEC 28) 0.25-35 MG-MCG per tablet Take 1 tablet by mouth daily Fill every 3 weeks skipping placebo pills, Disp-28 tablet, R-3Normal      venlafaxine (EFFEXOR XR) 37.5 MG extended release capsule Take 1 capsule by mouth in the morning. Historical Med      traZODone (DESYREL) 50 MG tablet Take 1 tablet by mouth in the morning. Historical Med      Riboflavin 400 MG TABS Take 400 mg by mouth dailyHistorical Med      levETIRAcetam (KEPPRA) 250 MG tablet Take 1 tablet by mouth in the morning. Historical Med      Atogepant (QULIPTA) 30 MG TABS Take 30 mg by mouth dailyHistorical Med      baclofen (LIORESAL) 10 MG tablet Take 1 tablet by mouth in the morning. Historical Med      Cholecalciferol 50 MCG (2000 UT) TABS Take 2,000 Units by mouth in the morning. Historical Med      buPROPion (WELLBUTRIN SR) 100 MG extended release tablet TAKE ONE TABLET BY MOUTH TWICE A DAY, Disp-60 tablet, R-3Normal vitamin B-12 (CYANOCOBALAMIN) 1000 MCG tablet Take 1 tablet by mouth daily, Disp-30 tablet, R-6Normal      butalbital-acetaminophen-caffeine (ESGIC) -40 MG per tablet Take 1 tablet by mouth every 6 hours as needed for Headaches, Disp-20 tablet, R-0Print      montelukast (SINGULAIR) 10 MG tablet TAKE ONE TABLET BY MOUTH ONCE NIGHTLY, Disp-30 tablet, R-5Normal      Galcanezumab-gnlm (EMGALITY) 120 MG/ML SOAJ Inject 120 mg into the skinHistorical Med      Rimegepant Sulfate (NURTEC) 75 MG TBDP Take 1 tablet by mouth as neededHistorical Med      sertraline (ZOLOFT) 50 MG tablet Take 1 tablet by mouth daily, Disp-30 tablet, R-5Normal      magnesium oxide (MAG-OX) 400 (241.3 Mg) MG TABS tablet TAKE ONE TABLET BY MOUTH DAILY FOR MIGRAINE, Disp-30 tablet, R-5Normal      gabapentin (NEURONTIN) 300 MG capsule Take 300 mg by mouth. Daily as needed. Rare useHistorical Med           ALLERGIES     is allergic to latex. FAMILY HISTORY     She indicated that her mother is alive. She indicated that her father is alive. She indicated that her sister is alive. She indicated that her paternal grandmother is . She indicated that her paternal grandfather is . She indicated that her paternal aunt is . She indicated that her maternal cousin is alive. SOCIAL HISTORY       Social History     Tobacco Use    Smoking status: Some Days     Packs/day: 0.25     Types: E-Cigarettes, Cigarettes    Smokeless tobacco: Never   Vaping Use    Vaping Use: Every day   Substance Use Topics    Alcohol use: Not Currently    Drug use: No     PHYSICAL EXAM     INITIAL VITALS: /79   Pulse 94   Temp 98.4 °F (36.9 °C) (Oral)   Resp 16   Ht 5' 2\" (1.575 m)   Wt 190 lb (86.2 kg)   LMP 2022   SpO2 98%   BMI 34.75 kg/m²    Physical Exam  Constitutional:       General: She is not in acute distress. Appearance: She is well-developed. HENT:      Head: Normocephalic.    Eyes:      Pupils: Pupils are equal, round, and reactive to light. Cardiovascular:      Rate and Rhythm: Normal rate and regular rhythm. Heart sounds: Normal heart sounds. Pulmonary:      Effort: Pulmonary effort is normal. No respiratory distress. Breath sounds: Normal breath sounds. Abdominal:      General: Bowel sounds are normal.      Palpations: Abdomen is soft. Tenderness: There is no abdominal tenderness. Musculoskeletal:         General: Normal range of motion. Skin:     General: Skin is warm and dry. Neurological:      Mental Status: She is alert and oriented to person, place, and time. MEDICAL DECISION MAKIN-year-old female presenting to the emergency room for headache. Given history CT imaging of the head was performed and is negative for obvious intracranial process. Migraine cocktail was administered here in the ED with improvement of symptoms. Patient has a normal neurologic exam and is alert oriented. I do not suspect infectious etiology for her headache. This is a recurring headache for the patient post coiling of aneurysm. We discussed neurology follow-up. CRITICAL CARE:       PROCEDURES:    Procedures    DIAGNOSTIC RESULTS   EKG:All EKG's are interpreted by the Emergency Department Physician who either signs or Co-signs this chart in the absence of a cardiologist.        RADIOLOGY:All plain film, CT, MRI, and formal ultrasound images (except ED bedside ultrasound) are read by the radiologist, see reports below, unless otherwisenoted in MDM or here. CT HEAD WO CONTRAST   Final Result   Stable appearing CT scan of the head without acute intracranial abnormality. LABS: All lab results were reviewed by myself, and all abnormals are listed below.   Labs Reviewed - No data to display    EMERGENCY DEPARTMENTCOURSE:         Vitals:    Vitals:    22 2359   BP: 111/79   Pulse: 94   Resp: 16   Temp: 98.4 °F (36.9 °C)   TempSrc: Oral   SpO2: 98%   Weight: 190 lb (86.2 kg) Height: 5' 2\" (1.575 m)       The patient was given the following medications while in the emergency department:  Orders Placed This Encounter   Medications    metoclopramide (REGLAN) injection 10 mg    diphenhydrAMINE (BENADRYL) injection 25 mg    ketorolac (TORADOL) injection 30 mg    ketorolac (TORADOL) 10 MG tablet     Sig: Take 1 tablet by mouth every 6 hours as needed for Pain     Dispense:  10 tablet     Refill:  0    metoclopramide (REGLAN) 10 MG tablet     Sig: Take 1 tablet by mouth as needed (migraine)     Dispense:  10 tablet     Refill:  0     CONSULTS:  None    FINAL IMPRESSION      1. Migraine without status migrainosus, not intractable, unspecified migraine type          DISPOSITION/PLAN   DISPOSITION Decision To Discharge 09/30/2022 03:54:50 AM      PATIENT REFERRED TO:  No follow-up provider specified. DISCHARGE MEDICATIONS:  Discharge Medication List as of 9/30/2022  3:56 AM        START taking these medications    Details   ketorolac (TORADOL) 10 MG tablet Take 1 tablet by mouth every 6 hours as needed for Pain, Disp-10 tablet, R-0Normal      metoclopramide (REGLAN) 10 MG tablet Take 1 tablet by mouth as needed (migraine), Disp-10 tablet, R-0Normal           The care is provided during an unprecedented national emergency due to the novel coronavirus, COVID 19.   MD Kimberlee Theodore MD  09/30/22 [id number]

## 2022-09-30 NOTE — DISCHARGE INSTRUCTIONS
I recommend follow-up with your neurologist.  I would ask them if there is any evidence that would stop them from prescribing ketorolac for your regularly. You may return to the ER at any time if symptoms persist or worsen.

## 2022-10-21 ENCOUNTER — TELEMEDICINE (OUTPATIENT)
Dept: FAMILY MEDICINE CLINIC | Age: 22
End: 2022-10-21
Payer: MEDICARE

## 2022-10-21 DIAGNOSIS — M54.50 CHRONIC MIDLINE LOW BACK PAIN, UNSPECIFIED WHETHER SCIATICA PRESENT: Primary | ICD-10-CM

## 2022-10-21 DIAGNOSIS — G89.29 CHRONIC MIDLINE LOW BACK PAIN, UNSPECIFIED WHETHER SCIATICA PRESENT: Primary | ICD-10-CM

## 2022-10-21 DIAGNOSIS — F32.A ANXIETY AND DEPRESSION: ICD-10-CM

## 2022-10-21 DIAGNOSIS — F41.9 ANXIETY AND DEPRESSION: ICD-10-CM

## 2022-10-21 PROCEDURE — G8427 DOCREV CUR MEDS BY ELIG CLIN: HCPCS | Performed by: NURSE PRACTITIONER

## 2022-10-21 PROCEDURE — 99213 OFFICE O/P EST LOW 20 MIN: CPT | Performed by: NURSE PRACTITIONER

## 2022-10-21 RX ORDER — MONTELUKAST SODIUM 10 MG/1
TABLET ORAL
Qty: 30 TABLET | Refills: 5 | Status: SHIPPED | OUTPATIENT
Start: 2022-10-21

## 2022-10-21 RX ORDER — GABAPENTIN 300 MG/1
300 CAPSULE ORAL DAILY PRN
Qty: 30 CAPSULE | Refills: 1 | Status: SHIPPED | OUTPATIENT
Start: 2022-10-21 | End: 2022-11-20

## 2022-10-21 RX ORDER — VENLAFAXINE HYDROCHLORIDE 75 MG/1
75 CAPSULE, EXTENDED RELEASE ORAL DAILY
Qty: 30 CAPSULE | Refills: 5 | Status: SHIPPED | OUTPATIENT
Start: 2022-10-21

## 2022-10-21 ASSESSMENT — PATIENT HEALTH QUESTIONNAIRE - PHQ9
SUM OF ALL RESPONSES TO PHQ QUESTIONS 1-9: 9
10. IF YOU CHECKED OFF ANY PROBLEMS, HOW DIFFICULT HAVE THESE PROBLEMS MADE IT FOR YOU TO DO YOUR WORK, TAKE CARE OF THINGS AT HOME, OR GET ALONG WITH OTHER PEOPLE: 0
2. FEELING DOWN, DEPRESSED OR HOPELESS: 2
5. POOR APPETITE OR OVEREATING: 0
3. TROUBLE FALLING OR STAYING ASLEEP: 3
SUM OF ALL RESPONSES TO PHQ QUESTIONS 1-9: 9
8. MOVING OR SPEAKING SO SLOWLY THAT OTHER PEOPLE COULD HAVE NOTICED. OR THE OPPOSITE, BEING SO FIGETY OR RESTLESS THAT YOU HAVE BEEN MOVING AROUND A LOT MORE THAN USUAL: 0
9. THOUGHTS THAT YOU WOULD BE BETTER OFF DEAD, OR OF HURTING YOURSELF: 0
4. FEELING TIRED OR HAVING LITTLE ENERGY: 3
1. LITTLE INTEREST OR PLEASURE IN DOING THINGS: 0
SUM OF ALL RESPONSES TO PHQ QUESTIONS 1-9: 9
SUM OF ALL RESPONSES TO PHQ QUESTIONS 1-9: 9
SUM OF ALL RESPONSES TO PHQ9 QUESTIONS 1 & 2: 2
7. TROUBLE CONCENTRATING ON THINGS, SUCH AS READING THE NEWSPAPER OR WATCHING TELEVISION: 1
6. FEELING BAD ABOUT YOURSELF - OR THAT YOU ARE A FAILURE OR HAVE LET YOURSELF OR YOUR FAMILY DOWN: 0

## 2022-10-21 NOTE — PROGRESS NOTES
Visit Information    Have you changed or started any medications since your last visit including any over-the-counter medicines, vitamins, or herbal medicines? no   Are you having any side effects from any of your medications? -  no  Have you stopped taking any of your medications? Is so, why? -  no    Have you seen any other physician or provider since your last visit? No  Have you had any other diagnostic tests since your last visit? No  Have you been seen in the emergency room and/or had an admission to a hospital since we last saw you? No  Have you had your routine dental cleaning in the past 6 months? no    Have you activated your [employer] account? If not, what are your barriers?  Yes     Patient Care Team:  Raleigh Mortimer, PA-C as PCP - General (Physician Assistant)  Raleigh Mortimer, PA-C as PCP - [facility]  LETY Cole CNP as Nurse Practitioner (Family Nurse Practitioner)    Medical History Review  Past Medical, Family, and Social History reviewed and  contribute to the patient presenting condition    Health Maintenance   Topic Date Due    COVID-19 Vaccine (1) Never done    Pneumococcal 0-64 years Vaccine (1 - PCV) Never done    Depression Monitoring  Never done    HIV screen  Never done    HPV vaccine (2 - 3-dose series) 09/28/2015    Hepatitis A vaccine (2 of 2 - 2-dose series) 02/29/2016    Hepatitis C screen  Never done    Flu vaccine (1) Never done    DTaP/Tdap/Td vaccine (7 - Td or Tdap) 08/29/2022    Chlamydia/GC screen  01/25/2023    Pap smear  01/25/2025    Varicella vaccine  Completed    Meningococcal (ACWY) vaccine  Completed    Hib vaccine  Aged Out

## 2022-10-21 NOTE — PROGRESS NOTES
Sandrine Alva (:  2000) is a Established patient, here for evaluation of the following:    Assessment & Plan   Below is the assessment and plan developed based on review of pertinent history, physical exam, labs, studies, and medications. 1. Chronic midline low back pain, unspecified whether sciatica present  Controlled Substance Monitoring:    Acute and Chronic Pain Monitoring:   RX Monitoring 10/21/2022   Periodic Controlled Substance Monitoring No signs of potential drug abuse or diversion identified. Imaging in care everywhere, non specific, but has failed mx muscle relaxers and nsaids, taking gabapentin very sparingly only on bad days, last refill 13 months ago. HEP advised      -     gabapentin (NEURONTIN) 300 MG capsule; Take 1 capsule by mouth daily as needed (back pain) for up to 30 days. Daily as needed. Rare use, Disp-30 capsule, R-1Normal  2. Anxiety and depression  Increase effexor for now and call in 2-3 weeks with update  INB will increase wellbutrin  Continue with psychology    -     venlafaxine (EFFEXOR XR) 75 MG extended release capsule; Take 1 capsule by mouth daily, Disp-30 capsule, R-5Normal  -     gabapentin (NEURONTIN) 300 MG capsule; Take 1 capsule by mouth daily as needed (back pain) for up to 30 days. Daily as needed. Rare use, Disp-30 capsule, R-1Normal  Return in about 3 months (around 2023) for anxiety/depression recheck. Subjective   HPI  Patient calling in today for med check and refills. States she does not feel her depression is currently controlled. Denies SI or HI. States she was started on Effexor about 5 months ago and wonders if the dose needs to be increased. She does also continue on trazodone for sleep and Wellbutrin. Also would like refill on gabapentin for chronic intermittent back pain. Has had several imaging studies in the past and last PCP was prescribing gabapentin as needed for flareups.   States she has tried [employer] and baclofen with side effects and states multiple anti-inflammatories have not been helpful. Denies leg weakness or falls. Review of Systems       Objective   Patient-Reported Vitals  Patient-Reported Weight: 185  Patient-Reported Height: 5 2       Physical Exam  [INSTRUCTIONS:  \"[x]\" Indicates a positive item  \"[]\" Indicates a negative item  -- DELETE ALL ITEMS NOT EXAMINED]    Constitutional: [x] Appears well-developed and well-nourished [x] No apparent distress      [] Abnormal -     Mental status: [x] Alert and awake  [x] Oriented to person/place/time [x] Able to follow commands    [] Abnormal -     Eyes:   EOM    [x]  Normal    [] Abnormal -   Sclera  [x]  Normal    [] Abnormal -          Discharge [x]  None visible   [] Abnormal -     HENT: [x] Normocephalic, atraumatic  [] Abnormal -   [x] Mouth/Throat: Mucous membranes are moist    External Ears [x] Normal  [] Abnormal -    Neck: [x] No visualized mass [] Abnormal -     Pulmonary/Chest: [x] Respiratory effort normal   [x] No visualized signs of difficulty breathing or respiratory distress        [] Abnormal -      Musculoskeletal:   [x] Normal gait with no signs of ataxia         [x] Normal range of motion of neck        [] Abnormal -     Neurological:        [x] No Facial Asymmetry (Cranial nerve 7 motor function) (limited exam due to video visit)          [x] No gaze palsy        [] Abnormal -          Skin:        [x] No significant exanthematous lesions or discoloration noted on facial skin         [] Abnormal -            Psychiatric:       [x] Normal Affect [] Abnormal -        [x] No Hallucinations    Other pertinent observable physical exam findings:-           Please note that this chart was generated using voice recognition Dragon dictation software. Although every effort was made to ensure the accuracy of this automated transcription, some errors in transcription may have occurred.       Ina Souza, was evaluated through a synchronous (real-time) audio-video encounter. The patient (or guardian if applicable) is aware that this is a billable service, which includes applicable co-pays. This Virtual Visit was conducted with patient's (and/or legal guardian's) consent. The visit was conducted pursuant to the emergency declaration under the [address] [place] [address], [address] [place] [address] and the [employer] General Act. Patient identification was verified, and a caregiver was present when appropriate. The patient was located at Home: [address] [place] [address].    [facility] Jose Sarmiento 12, APRN - CNP

## 2022-11-17 RX ORDER — BUPROPION HYDROCHLORIDE 100 MG/1
TABLET, EXTENDED RELEASE ORAL
Qty: 60 TABLET | Refills: 3 | Status: SHIPPED | OUTPATIENT
Start: 2022-11-17

## 2023-01-15 DIAGNOSIS — E88.81 INSULIN RESISTANCE: ICD-10-CM

## 2023-01-15 DIAGNOSIS — E53.8 B12 DEFICIENCY: ICD-10-CM

## 2023-01-15 RX ORDER — LANOLIN ALCOHOL/MO/W.PET/CERES
1000 CREAM (GRAM) TOPICAL DAILY
Qty: 30 TABLET | Refills: 0 | Status: SHIPPED | OUTPATIENT
Start: 2023-01-15

## 2023-01-17 ENCOUNTER — APPOINTMENT (OUTPATIENT)
Dept: CT IMAGING | Age: 23
End: 2023-01-17
Payer: MEDICARE

## 2023-01-17 ENCOUNTER — HOSPITAL ENCOUNTER (EMERGENCY)
Age: 23
Discharge: HOME | End: 2023-01-17
Attending: EMERGENCY MEDICINE
Payer: MEDICARE

## 2023-01-17 VITALS
HEART RATE: 96 BPM | OXYGEN SATURATION: 97 % | SYSTOLIC BLOOD PRESSURE: 117 MMHG | RESPIRATION RATE: 16 BRPM | DIASTOLIC BLOOD PRESSURE: 86 MMHG | TEMPERATURE: 98.5 F | WEIGHT: 180 LBS | BODY MASS INDEX: 32.92 KG/M2

## 2023-01-17 DIAGNOSIS — M62.838 SPASM OF MUSCLE: ICD-10-CM

## 2023-01-17 DIAGNOSIS — M54.2 NECK PAIN: Primary | ICD-10-CM

## 2023-01-17 LAB
ABSOLUTE EOS #: 0.15 K/UL (ref 0–0.44)
ABSOLUTE IMMATURE GRANULOCYTE: 0.02 K/UL (ref 0–0.3)
ABSOLUTE LYMPH #: 3.81 K/UL (ref 1.1–3.7)
ABSOLUTE MONO #: 0.47 K/UL (ref 0.1–1.2)
ALBUMIN SERPL-MCNC: 4.3 G/DL (ref 3.5–5.2)
ALP BLD-CCNC: 67 U/L (ref 35–104)
ALT SERPL-CCNC: 18 U/L (ref 5–33)
ANION GAP SERPL CALCULATED.3IONS-SCNC: 17 MMOL/L (ref 9–17)
AST SERPL-CCNC: 22 U/L
BASOPHILS # BLD: 1 % (ref 0–2)
BASOPHILS ABSOLUTE: 0.06 K/UL (ref 0–0.2)
BILIRUB SERPL-MCNC: 0.4 MG/DL (ref 0.3–1.2)
BUN BLDV-MCNC: 5 MG/DL (ref 6–20)
BUN/CREAT BLD: 8 (ref 9–20)
CALCIUM SERPL-MCNC: 9.7 MG/DL (ref 8.6–10.4)
CHLORIDE BLD-SCNC: 101 MMOL/L (ref 98–107)
CO2: 19 MMOL/L (ref 20–31)
CREAT SERPL-MCNC: 0.65 MG/DL (ref 0.5–0.9)
EOSINOPHILS RELATIVE PERCENT: 1 % (ref 1–4)
GFR SERPL CREATININE-BSD FRML MDRD: >60 ML/MIN/1.73M2
GLUCOSE BLD-MCNC: 93 MG/DL (ref 70–99)
HCG QUALITATIVE: NEGATIVE
HCT VFR BLD CALC: 42.4 % (ref 36.3–47.1)
HEMOGLOBIN: 13.1 G/DL (ref 11.9–15.1)
IMMATURE GRANULOCYTES: 0 %
INR BLD: 1.1
LYMPHOCYTES # BLD: 35 % (ref 24–43)
MCH RBC QN AUTO: 26.5 PG (ref 25.2–33.5)
MCHC RBC AUTO-ENTMCNC: 30.9 G/DL (ref 28.4–34.8)
MCV RBC AUTO: 85.7 FL (ref 82.6–102.9)
MONOCYTES # BLD: 4 % (ref 3–12)
NRBC AUTOMATED: 0 PER 100 WBC
PDW BLD-RTO: 12.3 % (ref 11.8–14.4)
PLATELET # BLD: 349 K/UL (ref 138–453)
PMV BLD AUTO: 11.5 FL (ref 8.1–13.5)
POTASSIUM SERPL-SCNC: 3.9 MMOL/L (ref 3.7–5.3)
PROTHROMBIN TIME: 14.2 SEC (ref 11.5–14.2)
RBC # BLD: 4.95 M/UL (ref 3.95–5.11)
SEG NEUTROPHILS: 59 % (ref 36–65)
SEGMENTED NEUTROPHILS ABSOLUTE COUNT: 6.36 K/UL (ref 1.5–8.1)
SODIUM BLD-SCNC: 137 MMOL/L (ref 135–144)
TOTAL PROTEIN: 7.4 G/DL (ref 6.4–8.3)
WBC # BLD: 10.9 K/UL (ref 3.5–11.3)

## 2023-01-17 PROCEDURE — 6360000002 HC RX W HCPCS: Performed by: EMERGENCY MEDICINE

## 2023-01-17 PROCEDURE — 2580000003 HC RX 258: Performed by: EMERGENCY MEDICINE

## 2023-01-17 PROCEDURE — 85025 COMPLETE CBC W/AUTO DIFF WBC: CPT

## 2023-01-17 PROCEDURE — 6370000000 HC RX 637 (ALT 250 FOR IP): Performed by: EMERGENCY MEDICINE

## 2023-01-17 PROCEDURE — 6360000004 HC RX CONTRAST MEDICATION: Performed by: EMERGENCY MEDICINE

## 2023-01-17 PROCEDURE — 85610 PROTHROMBIN TIME: CPT

## 2023-01-17 PROCEDURE — 99285 EMERGENCY DEPT VISIT HI MDM: CPT

## 2023-01-17 PROCEDURE — 96374 THER/PROPH/DIAG INJ IV PUSH: CPT

## 2023-01-17 PROCEDURE — 70450 CT HEAD/BRAIN W/O DYE: CPT

## 2023-01-17 PROCEDURE — 84703 CHORIONIC GONADOTROPIN ASSAY: CPT

## 2023-01-17 PROCEDURE — 70496 CT ANGIOGRAPHY HEAD: CPT

## 2023-01-17 PROCEDURE — 96375 TX/PRO/DX INJ NEW DRUG ADDON: CPT

## 2023-01-17 PROCEDURE — 80053 COMPREHEN METABOLIC PANEL: CPT

## 2023-01-17 RX ORDER — KETOROLAC TROMETHAMINE 10 MG/1
10 TABLET, FILM COATED ORAL EVERY 6 HOURS PRN
Qty: 20 TABLET | Refills: 0 | Status: SHIPPED | OUTPATIENT
Start: 2023-01-17

## 2023-01-17 RX ORDER — PREDNISONE 20 MG/1
50 TABLET ORAL ONCE
Status: COMPLETED | OUTPATIENT
Start: 2023-01-17 | End: 2023-01-17

## 2023-01-17 RX ORDER — MORPHINE SULFATE 4 MG/ML
4 INJECTION, SOLUTION INTRAMUSCULAR; INTRAVENOUS ONCE
Status: COMPLETED | OUTPATIENT
Start: 2023-01-17 | End: 2023-01-17

## 2023-01-17 RX ORDER — 0.9 % SODIUM CHLORIDE 0.9 %
1000 INTRAVENOUS SOLUTION INTRAVENOUS ONCE
Status: COMPLETED | OUTPATIENT
Start: 2023-01-17 | End: 2023-01-17

## 2023-01-17 RX ORDER — PREDNISONE 50 MG/1
50 TABLET ORAL DAILY
Qty: 5 TABLET | Refills: 0 | Status: SHIPPED | OUTPATIENT
Start: 2023-01-17 | End: 2023-01-22

## 2023-01-17 RX ORDER — SODIUM CHLORIDE 0.9 % (FLUSH) 0.9 %
10 SYRINGE (ML) INJECTION ONCE
Status: COMPLETED | OUTPATIENT
Start: 2023-01-17 | End: 2023-01-17

## 2023-01-17 RX ORDER — HYDROCODONE BITARTRATE AND ACETAMINOPHEN 5; 325 MG/1; MG/1
1 TABLET ORAL ONCE
Status: COMPLETED | OUTPATIENT
Start: 2023-01-17 | End: 2023-01-17

## 2023-01-17 RX ORDER — 0.9 % SODIUM CHLORIDE 0.9 %
80 INTRAVENOUS SOLUTION INTRAVENOUS ONCE
Status: DISCONTINUED | OUTPATIENT
Start: 2023-01-17 | End: 2023-01-18 | Stop reason: HOSPADM

## 2023-01-17 RX ORDER — LORAZEPAM 2 MG/ML
0.5 INJECTION INTRAMUSCULAR ONCE
Status: COMPLETED | OUTPATIENT
Start: 2023-01-17 | End: 2023-01-17

## 2023-01-17 RX ADMIN — Medication 100 ML: at 20:14

## 2023-01-17 RX ADMIN — SODIUM CHLORIDE, PRESERVATIVE FREE 10 ML: 5 INJECTION INTRAVENOUS at 20:15

## 2023-01-17 RX ADMIN — IOPAMIDOL 75 ML: 755 INJECTION, SOLUTION INTRAVENOUS at 20:15

## 2023-01-17 RX ADMIN — SODIUM CHLORIDE 1000 ML: 9 INJECTION, SOLUTION INTRAVENOUS at 19:36

## 2023-01-17 RX ADMIN — PREDNISONE 50 MG: 20 TABLET ORAL at 21:43

## 2023-01-17 RX ADMIN — HYDROCODONE BITARTRATE AND ACETAMINOPHEN 1 TABLET: 5; 325 TABLET ORAL at 21:43

## 2023-01-17 RX ADMIN — LORAZEPAM 0.5 MG: 2 INJECTION INTRAMUSCULAR at 19:36

## 2023-01-17 RX ADMIN — MORPHINE SULFATE 4 MG: 4 INJECTION, SOLUTION INTRAMUSCULAR; INTRAVENOUS at 19:36

## 2023-01-17 ASSESSMENT — PAIN SCALES - GENERAL
PAINLEVEL_OUTOF10: 10
PAINLEVEL_OUTOF10: 8
PAINLEVEL_OUTOF10: 10

## 2023-01-17 ASSESSMENT — PAIN DESCRIPTION - FREQUENCY: FREQUENCY: CONTINUOUS

## 2023-01-17 ASSESSMENT — PAIN - FUNCTIONAL ASSESSMENT: PAIN_FUNCTIONAL_ASSESSMENT: 0-10

## 2023-01-17 ASSESSMENT — PAIN DESCRIPTION - LOCATION: LOCATION: NECK

## 2023-01-17 ASSESSMENT — PAIN DESCRIPTION - DESCRIPTORS: DESCRIPTORS: SHARP;THROBBING;STABBING

## 2023-01-18 RX ORDER — METFORMIN HYDROCHLORIDE 500 MG/1
500 TABLET, EXTENDED RELEASE ORAL
Qty: 30 TABLET | Refills: 3 | Status: SHIPPED | OUTPATIENT
Start: 2023-01-18

## 2023-01-18 RX ORDER — METFORMIN HYDROCHLORIDE 500 MG/1
TABLET, EXTENDED RELEASE ORAL
Qty: 30 TABLET | Refills: 3 | OUTPATIENT
Start: 2023-01-18

## 2023-01-18 NOTE — ED PROVIDER NOTES
EMERGENCY DEPARTMENT ENCOUNTER    Pt Name: Bita Ortiz  MRN: [id number]  [place] 2000  Date of evaluation: 1/17/23  CHIEF COMPLAINT       Chief Complaint   Patient presents with    Neck Pain     LEFT SIDE ONSET THIS AM NO INJURY, hx of aneurysm. HISTORY OF PRESENT ILLNESS   Patient is a 58-year-old who presents to the ED with severe neck pain and concerned for aneurysm. Symptoms occurred after waking up from sleep this morning. Pain located left-sided neck. Pain does not radiate. She took ibuprofen this morning with minimal relief. Pain worse when trying to turn neck. Pain improves at rest.  She has history of left carotid aneurysm status post stent. On arrival she is tearful, anxious and concerned this is rupture of her aneurysm. Trauma or injuries reported. REVIEW OF SYSTEMS     Review of Systems   All other systems reviewed and are negative. PASTMEDICAL HISTORY     Past Medical History:   Diagnosis Date    Aneurysm of internal carotid artery     Anxiety     Asthma     Carotid aneurysm, left (HCC)     Headache     History of angiography 06/24/2021    CEREBRAL ANGIOGRAM    Moderate episode of recurrent major depressive disorder ([facility] [address] 1/7/2019     Past Problem List  Patient Active Problem List   Diagnosis Code    Moderate episode of recurrent major depressive disorder ([address] F33.1    Obesity (BMI 30.0-34. 9) E66.9    Chronic fatigue R53.82    Moderate persistent asthma without complication N96.14    Hyperglycemia R73.9    Subarachnoid hemorrhage (HCC) I60.9    Aneurysm of internal carotid artery I67.1    S/P cerebral aneurysm repair Z98.890, Z86.79    Aneurysm ([facility] [address] I72.9     SURGICAL HISTORY       Past Surgical History:   Procedure Laterality Date    CARDIAC CATHETERIZATION  12/17/2020    no stent here in [facility].     EYE SURGERY      lazy eye    OTHER SURGICAL HISTORY  12/16/2020    cerebral angiogram, stent placed     CURRENT MEDICATIONS       Discharge Medication List as of 1/17/2023 10:03 PM        CONTINUE these medications which have NOT CHANGED    Details   vitamin B-12 (CYANOCOBALAMIN) 1000 MCG tablet Take 1 tablet by mouth daily, Disp-30 tablet, R-0Normal      buPROPion (WELLBUTRIN SR) 100 MG extended release tablet TAKE ONE TABLET BY MOUTH TWICE A DAY, Disp-60 tablet, R-3Normal      SPRINTEC 28 0.25-35 MG-MCG per tablet TAKE ONE TABLET BY MOUTH DAILY, Disp-28 tablet, R-3Normal      venlafaxine (EFFEXOR XR) 75 MG extended release capsule Take 1 capsule by mouth daily, Disp-30 capsule, R-5Normal      montelukast (SINGULAIR) 10 MG tablet TAKE ONE TABLET BY MOUTH ONCE NIGHTLY, Disp-30 tablet, R-5Normal      gabapentin (NEURONTIN) 300 MG capsule Take 1 capsule by mouth daily as needed (back pain) for up to 30 days. Daily as needed. Rare use, Disp-30 capsule, R-1Normal      !! ketorolac (TORADOL) 10 MG tablet Take 1 tablet by mouth every 6 hours as needed for Pain, Disp-10 tablet, R-0Normal      metoclopramide (REGLAN) 10 MG tablet Take 1 tablet by mouth as needed (migraine), Disp-10 tablet, R-0Normal      ASPIRIN LOW DOSE 81 MG EC tablet TAKE ONE TABLET BY MOUTH DAILY, Disp-30 tablet, R-5Normal      loratadine (CLARITIN) 10 MG tablet TAKE ONE TABLET BY MOUTH ONCE NIGHTLY, Disp-30 tablet, R-6Normal      metFORMIN (GLUCOPHAGE-XR) 500 MG extended release tablet TAKE ONE TABLET BY MOUTH DAILY WITH BREAKFAST, Disp-30 tablet, R-3Normal      traZODone (DESYREL) 50 MG tablet Take 1 tablet by mouth in the morning. Historical Med      Riboflavin 400 MG TABS Take 400 mg by mouth dailyHistorical Med      levETIRAcetam (KEPPRA) 250 MG tablet Take 1 tablet by mouth in the morning. Historical Med      Atogepant (QULIPTA) 30 MG TABS Take 30 mg by mouth dailyHistorical Med      Cholecalciferol 50 MCG (2000 UT) TABS Take 2,000 Units by mouth in the morning. Historical Med      butalbital-acetaminophen-caffeine (ESGIC) -40 MG per tablet Take 1 tablet by mouth every 6 hours as needed for Headaches, Disp-20 tablet, R-0Print      magnesium oxide (MAG-OX) 400 (241.3 Mg) MG TABS tablet TAKE ONE TABLET BY MOUTH DAILY FOR MIGRAINE, Disp-30 tablet, R-5Normal       !! - Potential duplicate medications found. Please discuss with provider. ALLERGIES     is allergic to latex. FAMILY HISTORY     She indicated that her mother is alive. She indicated that her father is alive. She indicated that her sister is alive. She indicated that her paternal grandmother is . She indicated that her paternal grandfather is . She indicated that her paternal aunt is . She indicated that her maternal cousin is alive. SOCIAL HISTORY       Social History     Tobacco Use    Smoking status: Some Days     Packs/day: 0.25     Types: E-Cigarettes, Cigarettes    Smokeless tobacco: Never   Vaping Use    Vaping Use: Every day   Substance Use Topics    Alcohol use: Not Currently    Drug use: No     PHYSICAL EXAM     INITIAL VITALS: /86   Pulse 96   Temp 98.5 °F (36.9 °C) (Oral)   Resp 16   Wt 180 lb (81.6 kg)   LMP 2023   SpO2 97%   BMI 32.92 kg/m²    Physical Exam  Constitutional:       Appearance: Normal appearance. HENT:      Head: Normocephalic. Right Ear: External ear normal.      Left Ear: External ear normal.      Nose: Nose normal.   Eyes:      Conjunctiva/sclera: Conjunctivae normal.   Cardiovascular:      Rate and Rhythm: Regular rhythm. Abdominal:      General: There is no distension. Musculoskeletal:      Cervical back: Muscular tenderness (Tenderness, muscle spasm left trapezius muscle. No midline neck tenderness.) present. Skin:     General: Skin is dry. Neurological:      General: No focal deficit present. Mental Status: She is alert. Mental status is at baseline. Sensory: No sensory deficit. Motor: No weakness. MEDICAL DECISION MAKING / ED COURSE:   Summary of Patient Presentation: Temperature 98.5, pulse 96 and blood pressure 117/86.   Oximetry on room air is 97%. Exam notable for tenderness, spasm left trapezius muscle. 1)  Number and Complexity of Problems  Problem List This Visit: Neck pain    Differential Diagnosis: Muscle strain, muscle spasm. Diagnoses Considered but Do Not Suspect: Ruptured aneurysm, meningitis. Pertinent Comorbid Conditions: Left carotid aneurysm    2)  Data Reviewed  My EKG interpretation: N/A    Decision Rules/Scores utilized: N/A    HEART SCORE: N/A, no chest pain. NIH STROKE SCALE         Tests considered but not ordered and why: Carotid ultrasound, not offered by this hospital at this time. External Documents Reviewed: Previous medical notes, imaging per EMR. Imaging that is independently reviewed and interpreted by me as: CT head negative for acute bleed    See more data below for the lab and radiology tests and orders. 3)  Treatment and Disposition    Patient repeat assessment: Stable, pain improved with morphine and Ativan. Disposition discussion with patient/family: Discharge discussed with patient and . Case discussed with consulting clinician: N/A    MIPS: N/A    Social determinants of health impacting treatment or disposition:  N/A    Shared Decision Making: Shared decision making for discharge. Code Status Discussion: Not discussed. \"ED Course\" Notes From Epic Narrator:     Patient did well in the ED. Pain, spasms, anxiety improved with morphine and Ativan. Aneurysm ruled out by CT head, CTA. Given Rx for Toradol and prednisone. Given strict return precautions. All questions answered. No further work-up indicated this time. CRITICAL CARE:       PROCEDURES:  N/A  Procedures      DATA FOR LAB AND RADIOLOGY TESTS ORDERED BELOW ARE REVIEWED BY THE ED CLINICIAN:    RADIOLOGY: All x-rays, CT, MRI, and formal ultrasound images (except ED bedside ultrasound) are read by the radiologist, see reports below, unless otherwise noted in MDM or here.   Reports below are reviewed by myself. CT Head W/O Contrast   Final Result   1. No acute intracranial findings. 2. Stent in the left internal carotid artery, as before. CTA HEAD NECK W CONTRAST   Final Result   No acute abnormality or flow-limiting stenosis of the major arteries of the   head and neck. Patent vascular stent along distal cavernous and supraclinoid segment of the   left internal carotid artery, stable. Hypoplastic A1 segment of the left REGINA, stable. Hypoplastic intracranial left vertebral artery, normal variant. LABS: Lab orders shown below, the results are reviewed by myself, and all abnormals are listed below.   Labs Reviewed   COMPREHENSIVE METABOLIC PANEL - Abnormal; Notable for the following components:       Result Value    BUN 5 (*)     Bun/Cre Ratio 8 (*)     CO2 19 (*)     All other components within normal limits   CBC WITH AUTO DIFFERENTIAL - Abnormal; Notable for the following components:    Absolute Lymph # 3.81 (*)     All other components within normal limits   HCG, SERUM, QUALITATIVE   PROTIME-INR       Vitals Reviewed:    Vitals:    01/17/23 1644 01/17/23 1647   BP:  117/86   Pulse: 96    Resp: 16    Temp: 98.5 °F (36.9 °C)    TempSrc: Oral    SpO2: 97%    Weight: 180 lb (81.6 kg)      MEDICATIONS GIVEN TO PATIENT THIS ENCOUNTER:  Orders Placed This Encounter   Medications    morphine sulfate (PF) injection 4 mg    LORazepam (ATIVAN) injection 0.5 mg    0.9 % sodium chloride bolus    0.9 % sodium chloride bolus    iopamidol (ISOVUE-370) 76 % injection 75 mL    sodium chloride flush 0.9 % injection 10 mL    HYDROcodone-acetaminophen (NORCO) 5-325 MG per tablet 1 tablet    predniSONE (DELTASONE) tablet 50 mg    predniSONE (DELTASONE) 50 MG tablet     Sig: Take 1 tablet by mouth daily for 5 days     Dispense:  5 tablet     Refill:  0    ketorolac (TORADOL) 10 MG tablet     Sig: Take 1 tablet by mouth every 6 hours as needed for Pain     Dispense:  20 tablet     Refill:  0 DISCHARGE PRESCRIPTIONS:  Discharge Medication List as of 1/17/2023 10:03 PM        START taking these medications    Details   predniSONE (DELTASONE) 50 MG tablet Take 1 tablet by mouth daily for 5 days, Disp-5 tablet, R-0Normal      !! ketorolac (TORADOL) 10 MG tablet Take 1 tablet by mouth every 6 hours as needed for Pain, Disp-20 tablet, R-0Normal       !! - Potential duplicate medications found. Please discuss with provider. PHYSICIAN CONSULTS ORDERED THIS ENCOUNTER:  None  FINAL IMPRESSION      1. Neck pain    2. Spasm of muscle          DISPOSITION/PLAN   DISPOSITION Decision To Discharge 01/17/2023 10:02:28 PM      OUTPATIENT FOLLOW UP THE PATIENT:  Noé Wu PA-C  [address]   Cari callie [address] [place] [address]  [phone]    In 2 days      MD Nellie Alexandra MD  01/17/23 8466

## 2023-03-23 DIAGNOSIS — E53.8 B12 DEFICIENCY: ICD-10-CM

## 2023-03-24 RX ORDER — BUPROPION HYDROCHLORIDE 100 MG/1
TABLET, EXTENDED RELEASE ORAL
Qty: 60 TABLET | Refills: 3 | Status: SHIPPED | OUTPATIENT
Start: 2023-03-24

## 2023-03-24 RX ORDER — LANOLIN ALCOHOL/MO/W.PET/CERES
CREAM (GRAM) TOPICAL
Qty: 30 TABLET | Refills: 0 | Status: SHIPPED | OUTPATIENT
Start: 2023-03-24

## 2023-04-06 ENCOUNTER — PATIENT MESSAGE (OUTPATIENT)
Dept: FAMILY MEDICINE CLINIC | Age: 23
End: 2023-04-06

## 2023-04-06 DIAGNOSIS — Z59.41 FOOD INSECURITY: Primary | ICD-10-CM

## 2023-04-06 SDOH — ECONOMIC STABILITY - FOOD INSECURITY: FOOD INSECURITY: Z59.41

## 2023-04-06 NOTE — TELEPHONE ENCOUNTER
From: Saroj Mckenzie  To: Lethea Simmonds  Sent: 4/6/2023 12:35 AM EDT  Subject: Appointment    Im sorry I havent been to an appointment recently, I have been trying to [employer] all the time to pay bills. I am writing you because I heard that people who receive Medicaid can have their doctors write a prescription for the  food bank. I havent had the money for groceries in 6 months. The only reason I eat once a day is because my mom feeds me dinner, other then that I dont eat. I have lost 33 lbs in 3 months. I honestly need help if you could refer me to the food bank I would be extremely appreciative.

## 2023-04-07 ENCOUNTER — TELEPHONE (OUTPATIENT)
Dept: FAMILY MEDICINE CLINIC | Age: 23
End: 2023-04-07

## 2023-04-21 DIAGNOSIS — F41.9 ANXIETY AND DEPRESSION: ICD-10-CM

## 2023-04-21 DIAGNOSIS — F32.A ANXIETY AND DEPRESSION: ICD-10-CM

## 2023-04-24 RX ORDER — MONTELUKAST SODIUM 10 MG/1
TABLET ORAL
Qty: 90 TABLET | Refills: 1 | Status: SHIPPED | OUTPATIENT
Start: 2023-04-24

## 2023-04-24 RX ORDER — LORATADINE 10 MG/1
TABLET ORAL
Qty: 90 TABLET | Refills: 5 | Status: SHIPPED | OUTPATIENT
Start: 2023-04-24

## 2023-04-24 RX ORDER — VENLAFAXINE HYDROCHLORIDE 75 MG/1
CAPSULE, EXTENDED RELEASE ORAL
Qty: 30 CAPSULE | Refills: 5 | Status: SHIPPED | OUTPATIENT
Start: 2023-04-24

## 2023-05-01 DIAGNOSIS — E53.8 B12 DEFICIENCY: ICD-10-CM

## 2023-05-01 RX ORDER — LANOLIN ALCOHOL/MO/W.PET/CERES
1000 CREAM (GRAM) TOPICAL DAILY
Qty: 30 TABLET | Refills: 0 | Status: SHIPPED | OUTPATIENT
Start: 2023-05-01

## 2023-05-22 DIAGNOSIS — E88.81 INSULIN RESISTANCE: ICD-10-CM

## 2023-05-26 ENCOUNTER — TELEMEDICINE (OUTPATIENT)
Dept: FAMILY MEDICINE CLINIC | Age: 23
End: 2023-05-26
Payer: MEDICAID

## 2023-05-26 DIAGNOSIS — F32.A ANXIETY AND DEPRESSION: Primary | ICD-10-CM

## 2023-05-26 DIAGNOSIS — E88.81 INSULIN RESISTANCE: ICD-10-CM

## 2023-05-26 DIAGNOSIS — F41.9 ANXIETY AND DEPRESSION: Primary | ICD-10-CM

## 2023-05-26 PROCEDURE — 99213 OFFICE O/P EST LOW 20 MIN: CPT | Performed by: NURSE PRACTITIONER

## 2023-05-26 RX ORDER — METFORMIN HYDROCHLORIDE 500 MG/1
500 TABLET, EXTENDED RELEASE ORAL
Qty: 30 TABLET | Refills: 0 | Status: SHIPPED | OUTPATIENT
Start: 2023-05-26

## 2023-05-26 RX ORDER — BUPROPION HYDROCHLORIDE 300 MG/1
300 TABLET ORAL EVERY MORNING
Qty: 30 TABLET | Refills: 3 | Status: SHIPPED | OUTPATIENT
Start: 2023-05-26

## 2023-05-26 SDOH — ECONOMIC STABILITY: FOOD INSECURITY: WITHIN THE PAST 12 MONTHS, YOU WORRIED THAT YOUR FOOD WOULD RUN OUT BEFORE YOU GOT MONEY TO BUY MORE.: NEVER TRUE

## 2023-05-26 SDOH — ECONOMIC STABILITY: HOUSING INSECURITY
IN THE LAST 12 MONTHS, WAS THERE A TIME WHEN YOU DID NOT HAVE A STEADY PLACE TO SLEEP OR SLEPT IN A SHELTER (INCLUDING NOW)?: NO

## 2023-05-26 SDOH — ECONOMIC STABILITY: FOOD INSECURITY: WITHIN THE PAST 12 MONTHS, THE FOOD YOU BOUGHT JUST DIDN'T LAST AND YOU DIDN'T HAVE MONEY TO GET MORE.: NEVER TRUE

## 2023-05-26 SDOH — ECONOMIC STABILITY: INCOME INSECURITY: HOW HARD IS IT FOR YOU TO PAY FOR THE VERY BASICS LIKE FOOD, HOUSING, MEDICAL CARE, AND HEATING?: NOT HARD AT ALL

## 2023-05-26 ASSESSMENT — PATIENT HEALTH QUESTIONNAIRE - PHQ9
SUM OF ALL RESPONSES TO PHQ QUESTIONS 1-9: 2
SUM OF ALL RESPONSES TO PHQ QUESTIONS 1-9: 2
10. IF YOU CHECKED OFF ANY PROBLEMS, HOW DIFFICULT HAVE THESE PROBLEMS MADE IT FOR YOU TO DO YOUR WORK, TAKE CARE OF THINGS AT HOME, OR GET ALONG WITH OTHER PEOPLE: 0
4. FEELING TIRED OR HAVING LITTLE ENERGY: 0
3. TROUBLE FALLING OR STAYING ASLEEP: 0
8. MOVING OR SPEAKING SO SLOWLY THAT OTHER PEOPLE COULD HAVE NOTICED. OR THE OPPOSITE, BEING SO FIGETY OR RESTLESS THAT YOU HAVE BEEN MOVING AROUND A LOT MORE THAN USUAL: 0
5. POOR APPETITE OR OVEREATING: 0
6. FEELING BAD ABOUT YOURSELF - OR THAT YOU ARE A FAILURE OR HAVE LET YOURSELF OR YOUR FAMILY DOWN: 0
1. LITTLE INTEREST OR PLEASURE IN DOING THINGS: 1
SUM OF ALL RESPONSES TO PHQ QUESTIONS 1-9: 2
7. TROUBLE CONCENTRATING ON THINGS, SUCH AS READING THE NEWSPAPER OR WATCHING TELEVISION: 0
SUM OF ALL RESPONSES TO PHQ QUESTIONS 1-9: 2
2. FEELING DOWN, DEPRESSED OR HOPELESS: 1
SUM OF ALL RESPONSES TO PHQ9 QUESTIONS 1 & 2: 2
9. THOUGHTS THAT YOU WOULD BE BETTER OFF DEAD, OR OF HURTING YOURSELF: 0

## 2023-05-26 NOTE — PROGRESS NOTES
Visit Information    Have you changed or started any medications since your last visit including any over-the-counter medicines, vitamins, or herbal medicines? no   Have you stopped taking any of your medications? Is so, why? -  no  Are you having any side effects from any of your medications? - no    Have you seen any other physician or provider since your last visit?  no   Have you had any other diagnostic tests since your last visit?  no   Have you been seen in the emergency room and/or had an admission in a hospital since we last saw you?  no   Have you had your routine dental cleaning in the past 6 months?  no     Do you have an active MyChart account? If no, what is the barrier?   Yes    Patient Care Team:  LETY Cantu CNP as PCP - General (Family Nurse Practitioner)  LETY Cantu CNP as PCP - Empaneled Provider  LETY Ospina CNP as Nurse Practitioner (Family Nurse Practitioner)    Medical History Review  Past Medical, Family, and Social History reviewed and  contribute to the patient presenting condition    Health Maintenance   Topic Date Due    COVID-19 Vaccine (1) Never done    Pneumococcal 0-64 years Vaccine (1 - PCV) Never done    HIV screen  Never done    HPV vaccine (2 - 3-dose series) 09/28/2015    Hepatitis A vaccine (2 of 2 - 2-dose series) 02/29/2016    Hepatitis C screen  Never done    DTaP/Tdap/Td vaccine (7 - Td or Tdap) 08/29/2022    Chlamydia/GC screen  01/25/2023    Flu vaccine (Season Ended) 08/01/2023    Depression Monitoring  10/21/2023    Pap smear  01/25/2025    Varicella vaccine  Completed    Meningococcal (ACWY) vaccine  Completed    Hib vaccine  Aged Out    Hepatitis B vaccine  Discontinued
applicable co-pays. This Virtual Visit was conducted with patient's (and/or legal guardian's) consent. Patient identification was verified, and a caregiver was present when appropriate.    The patient was located at Home: [address] [place] [address]  Provider was located at [facility] ([address]): [address], [facility] - [facility]

## 2023-05-30 RX ORDER — ASPIRIN 81 MG/1
81 TABLET ORAL DAILY
Qty: 30 TABLET | Refills: 5 | Status: SHIPPED | OUTPATIENT
Start: 2023-05-30 | End: 2023-06-29

## 2023-05-31 DIAGNOSIS — E88.81 INSULIN RESISTANCE: ICD-10-CM

## 2023-05-31 RX ORDER — METFORMIN HYDROCHLORIDE 500 MG/1
500 TABLET, EXTENDED RELEASE ORAL
Qty: 30 TABLET | Refills: 0 | OUTPATIENT
Start: 2023-05-31

## 2023-06-01 RX ORDER — METFORMIN HYDROCHLORIDE 500 MG/1
TABLET, EXTENDED RELEASE ORAL
Qty: 30 TABLET | Refills: 3 | OUTPATIENT
Start: 2023-06-01

## 2023-06-06 DIAGNOSIS — E53.8 B12 DEFICIENCY: ICD-10-CM

## 2023-06-06 RX ORDER — LANOLIN ALCOHOL/MO/W.PET/CERES
CREAM (GRAM) TOPICAL
Qty: 30 TABLET | Refills: 0 | Status: SHIPPED | OUTPATIENT
Start: 2023-06-06

## 2023-06-28 DIAGNOSIS — E88.81 INSULIN RESISTANCE: ICD-10-CM

## 2023-06-28 RX ORDER — METFORMIN HYDROCHLORIDE 500 MG/1
TABLET, EXTENDED RELEASE ORAL
Qty: 30 TABLET | Refills: 3 | Status: SHIPPED | OUTPATIENT
Start: 2023-06-28

## 2023-07-24 DIAGNOSIS — F32.A ANXIETY AND DEPRESSION: ICD-10-CM

## 2023-07-24 DIAGNOSIS — F41.9 ANXIETY AND DEPRESSION: ICD-10-CM

## 2023-07-24 RX ORDER — BUPROPION HYDROCHLORIDE 300 MG/1
300 TABLET ORAL EVERY MORNING
Qty: 90 TABLET | Refills: 0 | Status: SHIPPED | OUTPATIENT
Start: 2023-07-24

## 2023-07-31 DIAGNOSIS — E53.8 B12 DEFICIENCY: ICD-10-CM

## 2023-08-01 RX ORDER — LANOLIN ALCOHOL/MO/W.PET/CERES
1000 CREAM (GRAM) TOPICAL DAILY
Qty: 30 TABLET | Refills: 0 | Status: SHIPPED | OUTPATIENT
Start: 2023-08-01

## 2023-08-18 ENCOUNTER — HOSPITAL ENCOUNTER (EMERGENCY)
Age: 23
Discharge: HOME | End: 2023-08-18
Attending: EMERGENCY MEDICINE
Payer: MEDICAID

## 2023-08-18 VITALS
WEIGHT: 165 LBS | SYSTOLIC BLOOD PRESSURE: 117 MMHG | DIASTOLIC BLOOD PRESSURE: 78 MMHG | TEMPERATURE: 98.7 F | BODY MASS INDEX: 30.18 KG/M2 | HEART RATE: 94 BPM | OXYGEN SATURATION: 98 % | RESPIRATION RATE: 16 BRPM

## 2023-08-18 DIAGNOSIS — T50.905A MEDICATION SIDE EFFECT, INITIAL ENCOUNTER: Primary | ICD-10-CM

## 2023-08-18 LAB
ANION GAP SERPL CALCULATED.3IONS-SCNC: 11 MMOL/L (ref 9–17)
BASOPHILS # BLD: 0.05 K/UL (ref 0–0.2)
BASOPHILS NFR BLD: 1 % (ref 0–2)
BILIRUB UR QL STRIP: NEGATIVE
BUN SERPL-MCNC: 7 MG/DL (ref 6–20)
BUN/CREAT SERPL: 10 (ref 9–20)
CALCIUM SERPL-MCNC: 9.3 MG/DL (ref 8.6–10.4)
CHLORIDE SERPL-SCNC: 102 MMOL/L (ref 98–107)
CLARITY UR: CLEAR
CO2 SERPL-SCNC: 26 MMOL/L (ref 20–31)
COLOR UR: YELLOW
CREAT SERPL-MCNC: 0.7 MG/DL (ref 0.5–0.9)
EOSINOPHIL # BLD: 0.05 K/UL (ref 0–0.44)
EOSINOPHILS RELATIVE PERCENT: 1 % (ref 1–4)
ERYTHROCYTE [DISTWIDTH] IN BLOOD BY AUTOMATED COUNT: 13.1 % (ref 11.8–14.4)
GFR SERPL CREATININE-BSD FRML MDRD: >60 ML/MIN/1.73M2
GLUCOSE SERPL-MCNC: 84 MG/DL (ref 70–99)
GLUCOSE UR STRIP-MCNC: NEGATIVE MG/DL
HCG UR QL: NEGATIVE
HCT VFR BLD AUTO: 39.9 % (ref 36.3–47.1)
HGB BLD-MCNC: 12.6 G/DL (ref 11.9–15.1)
HGB UR QL STRIP.AUTO: NEGATIVE
IMM GRANULOCYTES # BLD AUTO: 0.01 K/UL (ref 0–0.3)
IMM GRANULOCYTES NFR BLD: 0 %
KETONES UR STRIP-MCNC: NEGATIVE MG/DL
LEUKOCYTE ESTERASE UR QL STRIP: NEGATIVE
LYMPHOCYTES NFR BLD: 2.73 K/UL (ref 1.1–3.7)
LYMPHOCYTES RELATIVE PERCENT: 31 % (ref 24–43)
MCH RBC QN AUTO: 27.2 PG (ref 25.2–33.5)
MCHC RBC AUTO-ENTMCNC: 31.6 G/DL (ref 28.4–34.8)
MCV RBC AUTO: 86 FL (ref 82.6–102.9)
MONOCYTES NFR BLD: 0.42 K/UL (ref 0.1–1.2)
MONOCYTES NFR BLD: 5 % (ref 3–12)
NEUTROPHILS NFR BLD: 62 % (ref 36–65)
NEUTS SEG NFR BLD: 5.68 K/UL (ref 1.5–8.1)
NITRITE UR QL STRIP: NEGATIVE
NRBC BLD-RTO: 0 PER 100 WBC
PH UR STRIP: 6 [PH] (ref 5–8)
PLATELET # BLD AUTO: 296 K/UL (ref 138–453)
PMV BLD AUTO: 11.5 FL (ref 8.1–13.5)
POTASSIUM SERPL-SCNC: 3.9 MMOL/L (ref 3.7–5.3)
PROT UR STRIP-MCNC: NEGATIVE MG/DL
RBC # BLD AUTO: 4.64 M/UL (ref 3.95–5.11)
SARS-COV-2 RDRP RESP QL NAA+PROBE: NOT DETECTED
SODIUM SERPL-SCNC: 139 MMOL/L (ref 135–144)
SP GR UR STRIP: 1.01 (ref 1–1.03)
SPECIMEN DESCRIPTION: NORMAL
UROBILINOGEN UR STRIP-ACNC: NORMAL EU/DL (ref 0–1)
WBC OTHER # BLD: 8.9 K/UL (ref 3.5–11.3)

## 2023-08-18 PROCEDURE — 81025 URINE PREGNANCY TEST: CPT

## 2023-08-18 PROCEDURE — 81003 URINALYSIS AUTO W/O SCOPE: CPT

## 2023-08-18 PROCEDURE — 80048 BASIC METABOLIC PNL TOTAL CA: CPT

## 2023-08-18 PROCEDURE — 85025 COMPLETE CBC W/AUTO DIFF WBC: CPT

## 2023-08-18 PROCEDURE — 6370000000 HC RX 637 (ALT 250 FOR IP): Performed by: EMERGENCY MEDICINE

## 2023-08-18 PROCEDURE — 99283 EMERGENCY DEPT VISIT LOW MDM: CPT

## 2023-08-18 PROCEDURE — 87635 SARS-COV-2 COVID-19 AMP PRB: CPT

## 2023-08-18 RX ORDER — ALPRAZOLAM 0.5 MG/1
0.5 TABLET ORAL ONCE
Status: COMPLETED | OUTPATIENT
Start: 2023-08-18 | End: 2023-08-18

## 2023-08-18 RX ORDER — HYDROXYZINE HYDROCHLORIDE 25 MG/1
25 TABLET, FILM COATED ORAL EVERY 8 HOURS PRN
Qty: 10 TABLET | Refills: 0 | Status: SHIPPED | OUTPATIENT
Start: 2023-08-18 | End: 2023-08-28

## 2023-08-18 RX ORDER — ALPRAZOLAM 0.25 MG/1
0.25 TABLET ORAL ONCE
Status: DISCONTINUED | OUTPATIENT
Start: 2023-08-18 | End: 2023-08-18

## 2023-08-18 RX ADMIN — ALPRAZOLAM 0.5 MG: 0.5 TABLET ORAL at 19:38

## 2023-08-18 ASSESSMENT — PAIN - FUNCTIONAL ASSESSMENT: PAIN_FUNCTIONAL_ASSESSMENT: NONE - DENIES PAIN

## 2023-08-18 NOTE — ED PROVIDER NOTES
[facility] ED  Emergency Department Encounter  Emergency Medicine Physician Note     Pt Leyla Egan  MRN: [id number]  [address] 2000  Date of evaluation: 8/18/23  PCP:  Zenaida Prabhakar PA-C  Note Started: 6:55 PM EDT      CHIEF COMPLAINT       Chief Complaint   Patient presents with    Dizziness     Started new med last week (Lamictal) and stopped wellbutrin . States stopped lamictal 2 days ago    Sweats    Fatigue       HISTORY OF PRESENT ILLNESS  (Location/Symptom, Timing/Onset, Context/Setting, Quality, Duration, Modifying Factors, Severity.)      Ina Rajan is a 20 y.o. female who presents with chills, tremors, and fatigue has been going on for approximately 4-5 days. Patient did start Lamictal 2 weeks ago, has stopped it 3 days ago when she started having new onset symptoms. Patient denies headache. States poor diet, insomnia. PAST MEDICAL / SURGICAL / SOCIAL / FAMILY HISTORY      has a past medical history of Aneurysm of internal carotid artery, Anxiety, Asthma, Carotid aneurysm, left ([address]), Headache, History of angiography, and Moderate episode of recurrent major depressive disorder ([address]). No additional pertinent        has a past surgical history that includes Eye surgery; other surgical history (12/16/2020); and Cardiac catheterization (12/17/2020).   No additional pertinent       Social History     Socioeconomic History    Marital status:      Spouse name: Not on file    Number of children: Not on file    Years of education: Not on file    Highest education level: Not on file   Occupational History    Not on file   Tobacco Use    Smoking status: Some Days     Packs/day: 0.25     Types: E-Cigarettes, Cigarettes    Smokeless tobacco: Never   Vaping Use    Vaping Use: Every day   Substance and Sexual Activity    Alcohol use: Not Currently    Drug use: No    Sexual activity: Yes     Partners: Male   Other Topics Concern    Not on file   Social History Narrative voice recognition program.  Efforts were made to edit the dictations but occasionally words are mis-transcribed.)        [address] [place] [address],[address],   08/19/23 4897

## 2023-08-19 ASSESSMENT — ENCOUNTER SYMPTOMS
COUGH: 0
SHORTNESS OF BREATH: 0
ABDOMINAL PAIN: 0

## 2023-08-19 NOTE — DISCHARGE INSTRUCTIONS
If given narcotics (opiates) or benzodiazepines during this Emergency Department visit, please do not drink, drive or operate any machinery for at least 4 - 6 hours. PLEASE RETURN TO THE EMERGENCY DEPARTMENT IMMEDIATELY for worsening symptoms, or if you develop any concerning symptoms such as: high fever not relieved by acetaminophen (Tylenol) and/or ibuprofen (Motrin), chills, shortness of breath, chest pain, persistent nausea and/or vomiting, numbness, weakness or tingling in the arms or legs or change in color of the extremities, changes in mental status, persistent headache, blurry vision. Return within 8 - 12 hours if you have any of the following: worsening of pain in your abdomen, no food sounds good to you, you continue to vomit, pain goes to your back, have pain in the abdomen when going over a bump in the car or when you jump up and down, develop vaginal bleeding or discharge, inability to urinate, unable to follow up with your physician, or other any other care or concern.

## 2023-08-24 DIAGNOSIS — E53.8 B12 DEFICIENCY: ICD-10-CM

## 2023-08-24 RX ORDER — LANOLIN ALCOHOL/MO/W.PET/CERES
1000 CREAM (GRAM) TOPICAL DAILY
Qty: 30 TABLET | Refills: 0 | Status: SHIPPED | OUTPATIENT
Start: 2023-08-24

## 2023-08-24 NOTE — TELEPHONE ENCOUNTER
Jarad Correa please review. Looks like patient has been seeing you. I last saw her in 2021.  Ok to change pcp on chart

## 2023-08-25 ENCOUNTER — APPOINTMENT (OUTPATIENT)
Dept: CT IMAGING | Age: 23
End: 2023-08-25
Payer: MEDICAID

## 2023-08-25 ENCOUNTER — HOSPITAL ENCOUNTER (EMERGENCY)
Age: 23
Discharge: HOME | End: 2023-08-26
Attending: EMERGENCY MEDICINE
Payer: MEDICAID

## 2023-08-25 VITALS
WEIGHT: 165 LBS | OXYGEN SATURATION: 100 % | HEART RATE: 83 BPM | DIASTOLIC BLOOD PRESSURE: 90 MMHG | SYSTOLIC BLOOD PRESSURE: 117 MMHG | RESPIRATION RATE: 14 BRPM | BODY MASS INDEX: 30.18 KG/M2 | TEMPERATURE: 97.8 F

## 2023-08-25 DIAGNOSIS — G43.809 OTHER MIGRAINE WITHOUT STATUS MIGRAINOSUS, NOT INTRACTABLE: Primary | ICD-10-CM

## 2023-08-25 LAB
ANION GAP SERPL CALCULATED.3IONS-SCNC: 12 MMOL/L (ref 9–17)
BASOPHILS # BLD: 0.05 K/UL (ref 0–0.2)
BASOPHILS NFR BLD: 1 % (ref 0–2)
BUN SERPL-MCNC: 7 MG/DL (ref 6–20)
BUN/CREAT SERPL: 10 (ref 9–20)
CALCIUM SERPL-MCNC: 9.5 MG/DL (ref 8.6–10.4)
CHLORIDE SERPL-SCNC: 102 MMOL/L (ref 98–107)
CO2 SERPL-SCNC: 26 MMOL/L (ref 20–31)
CREAT SERPL-MCNC: 0.7 MG/DL (ref 0.5–0.9)
EOSINOPHIL # BLD: 0.05 K/UL (ref 0–0.44)
EOSINOPHILS RELATIVE PERCENT: 1 % (ref 1–4)
ERYTHROCYTE [DISTWIDTH] IN BLOOD BY AUTOMATED COUNT: 13.1 % (ref 11.8–14.4)
GFR SERPL CREATININE-BSD FRML MDRD: >60 ML/MIN/1.73M2
GLUCOSE SERPL-MCNC: 77 MG/DL (ref 70–99)
HCG SERPL QL: NEGATIVE
HCT VFR BLD AUTO: 39.6 % (ref 36.3–47.1)
HGB BLD-MCNC: 12.7 G/DL (ref 11.9–15.1)
IMM GRANULOCYTES # BLD AUTO: 0.02 K/UL (ref 0–0.3)
IMM GRANULOCYTES NFR BLD: 0 %
LYMPHOCYTES NFR BLD: 2.87 K/UL (ref 1.1–3.7)
LYMPHOCYTES RELATIVE PERCENT: 35 % (ref 24–43)
MCH RBC QN AUTO: 27.2 PG (ref 25.2–33.5)
MCHC RBC AUTO-ENTMCNC: 32.1 G/DL (ref 28.4–34.8)
MCV RBC AUTO: 84.8 FL (ref 82.6–102.9)
MONOCYTES NFR BLD: 0.41 K/UL (ref 0.1–1.2)
MONOCYTES NFR BLD: 5 % (ref 3–12)
NEUTROPHILS NFR BLD: 58 % (ref 36–65)
NEUTS SEG NFR BLD: 4.79 K/UL (ref 1.5–8.1)
NRBC BLD-RTO: 0 PER 100 WBC
PLATELET # BLD AUTO: 301 K/UL (ref 138–453)
PMV BLD AUTO: 11.5 FL (ref 8.1–13.5)
POTASSIUM SERPL-SCNC: 3.8 MMOL/L (ref 3.7–5.3)
RBC # BLD AUTO: 4.67 M/UL (ref 3.95–5.11)
SODIUM SERPL-SCNC: 140 MMOL/L (ref 135–144)
WBC OTHER # BLD: 8.2 K/UL (ref 3.5–11.3)

## 2023-08-25 PROCEDURE — 6360000004 HC RX CONTRAST MEDICATION: Performed by: EMERGENCY MEDICINE

## 2023-08-25 PROCEDURE — 6360000002 HC RX W HCPCS: Performed by: EMERGENCY MEDICINE

## 2023-08-25 PROCEDURE — 2580000003 HC RX 258: Performed by: EMERGENCY MEDICINE

## 2023-08-25 PROCEDURE — 84703 CHORIONIC GONADOTROPIN ASSAY: CPT

## 2023-08-25 PROCEDURE — 80048 BASIC METABOLIC PNL TOTAL CA: CPT

## 2023-08-25 PROCEDURE — 96374 THER/PROPH/DIAG INJ IV PUSH: CPT

## 2023-08-25 PROCEDURE — 85025 COMPLETE CBC W/AUTO DIFF WBC: CPT

## 2023-08-25 PROCEDURE — 70498 CT ANGIOGRAPHY NECK: CPT

## 2023-08-25 PROCEDURE — 96375 TX/PRO/DX INJ NEW DRUG ADDON: CPT

## 2023-08-25 RX ORDER — 0.9 % SODIUM CHLORIDE 0.9 %
80 INTRAVENOUS SOLUTION INTRAVENOUS ONCE
Status: COMPLETED | OUTPATIENT
Start: 2023-08-25 | End: 2023-08-25

## 2023-08-25 RX ORDER — SODIUM CHLORIDE 0.9 % (FLUSH) 0.9 %
10 SYRINGE (ML) INJECTION PRN
Status: DISCONTINUED | OUTPATIENT
Start: 2023-08-25 | End: 2023-08-26 | Stop reason: HOSPADM

## 2023-08-25 RX ORDER — DIPHENHYDRAMINE HYDROCHLORIDE 50 MG/ML
12.5 INJECTION INTRAMUSCULAR; INTRAVENOUS ONCE
Status: COMPLETED | OUTPATIENT
Start: 2023-08-25 | End: 2023-08-25

## 2023-08-25 RX ORDER — METOCLOPRAMIDE HYDROCHLORIDE 5 MG/ML
10 INJECTION INTRAMUSCULAR; INTRAVENOUS ONCE
Status: COMPLETED | OUTPATIENT
Start: 2023-08-25 | End: 2023-08-25

## 2023-08-25 RX ORDER — MORPHINE SULFATE 4 MG/ML
4 INJECTION, SOLUTION INTRAMUSCULAR; INTRAVENOUS ONCE
Status: COMPLETED | OUTPATIENT
Start: 2023-08-25 | End: 2023-08-25

## 2023-08-25 RX ORDER — NALBUPHINE HYDROCHLORIDE 10 MG/ML
5 INJECTION, SOLUTION INTRAMUSCULAR; INTRAVENOUS; SUBCUTANEOUS ONCE
Status: DISCONTINUED | OUTPATIENT
Start: 2023-08-25 | End: 2023-08-26 | Stop reason: HOSPADM

## 2023-08-25 RX ADMIN — IOPAMIDOL 75 ML: 755 INJECTION, SOLUTION INTRAVENOUS at 22:33

## 2023-08-25 RX ADMIN — MORPHINE SULFATE 4 MG: 4 INJECTION, SOLUTION INTRAMUSCULAR; INTRAVENOUS at 22:15

## 2023-08-25 RX ADMIN — SODIUM CHLORIDE 80 ML: 9 INJECTION, SOLUTION INTRAVENOUS at 22:33

## 2023-08-25 RX ADMIN — SODIUM CHLORIDE, PRESERVATIVE FREE 10 ML: 5 INJECTION INTRAVENOUS at 22:33

## 2023-08-25 RX ADMIN — DIPHENHYDRAMINE HYDROCHLORIDE 12.5 MG: 50 INJECTION INTRAMUSCULAR; INTRAVENOUS at 22:15

## 2023-08-25 RX ADMIN — METOCLOPRAMIDE HYDROCHLORIDE 10 MG: 5 INJECTION INTRAMUSCULAR; INTRAVENOUS at 22:15

## 2023-08-25 ASSESSMENT — PAIN DESCRIPTION - PAIN TYPE: TYPE: ACUTE PAIN

## 2023-08-25 ASSESSMENT — PAIN DESCRIPTION - LOCATION
LOCATION: HEAD
LOCATION: HEAD

## 2023-08-25 ASSESSMENT — PAIN - FUNCTIONAL ASSESSMENT: PAIN_FUNCTIONAL_ASSESSMENT: 0-10

## 2023-08-25 ASSESSMENT — PAIN SCALES - GENERAL
PAINLEVEL_OUTOF10: 10
PAINLEVEL_OUTOF10: 10

## 2023-08-25 ASSESSMENT — PAIN DESCRIPTION - DESCRIPTORS
DESCRIPTORS: ACHING;SHARP
DESCRIPTORS: ACHING;THROBBING;SHARP;SHOOTING

## 2023-08-25 ASSESSMENT — PAIN DESCRIPTION - FREQUENCY: FREQUENCY: CONTINUOUS

## 2023-08-26 PROCEDURE — 6360000002 HC RX W HCPCS: Performed by: EMERGENCY MEDICINE

## 2023-08-26 PROCEDURE — 96375 TX/PRO/DX INJ NEW DRUG ADDON: CPT

## 2023-08-26 PROCEDURE — 99285 EMERGENCY DEPT VISIT HI MDM: CPT

## 2023-08-26 RX ORDER — KETOROLAC TROMETHAMINE 30 MG/ML
30 INJECTION, SOLUTION INTRAMUSCULAR; INTRAVENOUS ONCE
Status: COMPLETED | OUTPATIENT
Start: 2023-08-26 | End: 2023-08-26

## 2023-08-26 RX ADMIN — KETOROLAC TROMETHAMINE 30 MG: 30 INJECTION, SOLUTION INTRAMUSCULAR; INTRAVENOUS at 00:41

## 2023-08-26 ASSESSMENT — ENCOUNTER SYMPTOMS: PHOTOPHOBIA: 1

## 2023-08-26 NOTE — ED NOTES
Patient presents to ED [facility] with c/o frontal migraine pain with light sensitivity. Patient states the migraine started about a week ago, but the light sensitivity just started today which is why she is here. Patient has about a 3 year history of migraine after having had a brain aneurysm diagnosis. Patient does not generally take anything for the migraines, has tried Nortriptyline per her neurologist in the past with no effect. Patient rates pain a 9/10, pressure and sharp at times across the frontal lobe. Patient reports the shots of Benadryl, Reglan, and Toradol in the ED have worked in the past to kitty her migraine pain.      Fara Mckenzie., RN  17/85/52 0443

## 2023-08-26 NOTE — DISCHARGE INSTRUCTIONS
Make sure you get plenty of fluids over the next 24 hours. Avoid bright lights and screens if possible. I do recommend follow-up with your neurologist if you continue to have issues.

## 2023-08-28 ENCOUNTER — TELEPHONE (OUTPATIENT)
Dept: FAMILY MEDICINE CLINIC | Age: 23
End: 2023-08-28

## 2023-08-28 DIAGNOSIS — R19.7 DIARRHEA, UNSPECIFIED TYPE: Primary | ICD-10-CM

## 2023-08-28 NOTE — TELEPHONE ENCOUNTER
Patient needs a referral to margaret espinoza from [facility] [place] for a colooscopy.

## 2023-08-29 NOTE — TELEPHONE ENCOUNTER
Patient is having severe stomach pain, diarrhea. She went to another GI and he told her that she needs a colonoscopy.

## 2023-09-13 ENCOUNTER — OFFICE VISIT (OUTPATIENT)
Dept: FAMILY MEDICINE CLINIC | Age: 23
End: 2023-09-13
Payer: COMMERCIAL

## 2023-09-13 ENCOUNTER — HOSPITAL ENCOUNTER (OUTPATIENT)
Age: 23
Setting detail: SPECIMEN
Discharge: HOME | End: 2023-09-13

## 2023-09-13 VITALS
TEMPERATURE: 98.3 F | WEIGHT: 164 LBS | HEART RATE: 101 BPM | OXYGEN SATURATION: 98 % | DIASTOLIC BLOOD PRESSURE: 64 MMHG | BODY MASS INDEX: 30.18 KG/M2 | HEIGHT: 62 IN | SYSTOLIC BLOOD PRESSURE: 102 MMHG

## 2023-09-13 DIAGNOSIS — R63.4 WEIGHT LOSS: ICD-10-CM

## 2023-09-13 DIAGNOSIS — Z00.00 ENCOUNTER FOR WELL ADULT EXAM WITHOUT ABNORMAL FINDINGS: Primary | ICD-10-CM

## 2023-09-13 PROCEDURE — 99395 PREV VISIT EST AGE 18-39: CPT | Performed by: PHYSICIAN ASSISTANT

## 2023-09-13 RX ORDER — LANOLIN ALCOHOL/MO/W.PET/CERES
CREAM (GRAM) TOPICAL
COMMUNITY
Start: 2023-09-11

## 2023-09-13 RX ORDER — FAMOTIDINE 20 MG/1
20 TABLET, FILM COATED ORAL 2 TIMES DAILY
Qty: 60 TABLET | Refills: 5 | Status: SHIPPED | OUTPATIENT
Start: 2023-09-13

## 2023-09-13 RX ORDER — ALBUTEROL SULFATE 2.5 MG/3ML
SOLUTION RESPIRATORY (INHALATION)
COMMUNITY
Start: 2020-01-07

## 2023-09-13 RX ORDER — NARATRIPTAN 2.5 MG/1
TABLET ORAL
COMMUNITY
Start: 2023-09-07

## 2023-09-13 RX ORDER — FAMOTIDINE 20 MG/1
TABLET, FILM COATED ORAL
COMMUNITY
Start: 2023-08-28 | End: 2023-09-13 | Stop reason: SDUPTHER

## 2023-09-13 ASSESSMENT — ENCOUNTER SYMPTOMS
BACK PAIN: 0
SINUS PRESSURE: 0
NAUSEA: 0
VOMITING: 0
EYE DISCHARGE: 0
ABDOMINAL PAIN: 0
TROUBLE SWALLOWING: 0
VOICE CHANGE: 0
RHINORRHEA: 0
COUGH: 0
DIARRHEA: 0
COLOR CHANGE: 0
BLOOD IN STOOL: 0
SINUS PAIN: 0
SORE THROAT: 0
WHEEZING: 0
EYE PAIN: 0
CHEST TIGHTNESS: 0
SHORTNESS OF BREATH: 0
CONSTIPATION: 0

## 2023-09-13 NOTE — PROGRESS NOTES
Well Adult Note  Name: Zeina Bucio Date: 2023   MRN: [id number] Sex: Female   Age: 24 y.o. Ethnicity: Non- / Non    : 2000 Race: White (non-)      Ina Guerra is here for well adult exam.  History:    Patient is here for a well exam. She reports feeling well but has been noticing weight loss. She has not had much of an appetite the last year and reports eating smaller portions. She is happy with the weight loss and is otherwise feeling ok. She reports still seeing her neurologist for migraines. She states no new medications  She reports she is generally inactive. She is not working or going to school at this time. She states she does very little during the day. She is not doing any regular exercise. She does have good social support surrounding her. She has been smoking more marijuana over the last year. She has no intention at this time of smoking. Review of Systems   Constitutional:  Negative for activity change, appetite change, chills, fatigue, fever and unexpected weight change. HENT:  Negative for congestion, ear pain, postnasal drip, rhinorrhea, sinus pressure, sinus pain, sneezing, sore throat, trouble swallowing and voice change. Eyes:  Negative for pain, discharge and visual disturbance. Respiratory:  Negative for cough, chest tightness, shortness of breath and wheezing. Cardiovascular:  Negative for chest pain, palpitations and leg swelling. Gastrointestinal:  Negative for abdominal pain, blood in stool, constipation, diarrhea, nausea and vomiting. Endocrine: Negative for cold intolerance and heat intolerance. Genitourinary:  Negative for dysuria, flank pain, frequency, hematuria, pelvic pain and urgency. Musculoskeletal:  Negative for arthralgias, back pain, gait problem, joint swelling, myalgias, neck pain and neck stiffness. Skin:  Negative for color change, pallor, rash and wound.    Allergic/Immunologic:

## 2023-09-13 NOTE — PROGRESS NOTES
Visit Information    Have you changed or started any medications since your last visit including any over-the-counter medicines, vitamins, or herbal medicines? no   Are you having any side effects from any of your medications? -  no  Have you stopped taking any of your medications? Is so, why? -  no    Have you seen any other physician or provider since your last visit? No  Have you had any other diagnostic tests since your last visit? No  Have you been seen in the emergency room and/or had an admission to a hospital since we last saw you? No  Have you had your routine dental cleaning in the past 6 months? no    Have you activated your [employer] account? If not, what are your barriers?  Yes     Patient Care Team:  LETY Chung CNP as PCP - General (Family Nurse Practitioner)  LETY Chung CNP as PCP - Empaneled Provider  LETY Devlin CNP as Nurse Practitioner (Family Nurse Practitioner)    Medical History Review  Past Medical, Family, and Social History reviewed and  contribute to the patient presenting condition    Health Maintenance   Topic Date Due    COVID-19 Vaccine (1) Never done    Pneumococcal 0-64 years Vaccine (1 - PCV) Never done    HIV screen  Never done    HPV vaccine (2 - 3-dose series) 09/28/2015    Hepatitis A vaccine (2 of 2 - 2-dose series) 02/29/2016    Hepatitis C screen  Never done    DTaP/Tdap/Td vaccine (7 - Td or Tdap) 08/29/2022    Chlamydia/GC screen  01/25/2023    Flu vaccine (1) Never done    Depression Monitoring  05/26/2024    Pap smear  01/25/2025    Hepatitis B vaccine  Completed    Varicella vaccine  Completed    Meningococcal (ACWY) vaccine  Completed    Hib vaccine  Aged Out

## 2023-09-14 LAB — TSH SERPL DL<=0.05 MIU/L-ACNC: 1.22 UIU/ML (ref 0.3–5)

## 2023-09-15 ENCOUNTER — PATIENT MESSAGE (OUTPATIENT)
Dept: FAMILY MEDICINE CLINIC | Age: 23
End: 2023-09-15

## 2023-09-17 NOTE — TELEPHONE ENCOUNTER
From: Omar Cobb  To: Ang Kohli  Sent: 9/15/2023 4:18 PM EDT  Subject: Lulu Pena Referral     The gastro doctor [facility] office said they never got a referral from you, can you please resend it

## 2023-09-28 DIAGNOSIS — F41.9 ANXIETY AND DEPRESSION: ICD-10-CM

## 2023-09-28 DIAGNOSIS — F32.A ANXIETY AND DEPRESSION: ICD-10-CM

## 2023-09-28 RX ORDER — MONTELUKAST SODIUM 10 MG/1
TABLET ORAL
Qty: 90 TABLET | Refills: 1 | Status: SHIPPED | OUTPATIENT
Start: 2023-09-28

## 2023-09-28 RX ORDER — VENLAFAXINE HYDROCHLORIDE 75 MG/1
CAPSULE, EXTENDED RELEASE ORAL
Qty: 180 CAPSULE | Refills: 1 | Status: SHIPPED | OUTPATIENT
Start: 2023-09-28

## 2023-10-09 DIAGNOSIS — E53.8 B12 DEFICIENCY: ICD-10-CM

## 2023-10-09 RX ORDER — LANOLIN ALCOHOL/MO/W.PET/CERES
1000 CREAM (GRAM) TOPICAL DAILY
Qty: 30 TABLET | Refills: 0 | OUTPATIENT
Start: 2023-10-09

## 2023-10-24 DIAGNOSIS — E88.819 INSULIN RESISTANCE: ICD-10-CM

## 2023-10-24 RX ORDER — METFORMIN HYDROCHLORIDE 500 MG/1
500 TABLET, EXTENDED RELEASE ORAL
Qty: 30 TABLET | Refills: 3 | Status: SHIPPED | OUTPATIENT
Start: 2023-10-24

## 2023-12-05 DIAGNOSIS — F41.9 ANXIETY AND DEPRESSION: ICD-10-CM

## 2023-12-05 DIAGNOSIS — F32.A ANXIETY AND DEPRESSION: ICD-10-CM

## 2023-12-06 RX ORDER — BUPROPION HYDROCHLORIDE 300 MG/1
300 TABLET ORAL EVERY MORNING
Qty: 30 TABLET | Refills: 5 | Status: SHIPPED | OUTPATIENT
Start: 2023-12-06

## 2024-01-04 RX ORDER — ASPIRIN 81 MG/1
81 TABLET ORAL DAILY
Qty: 30 TABLET | Refills: 5 | Status: SHIPPED | OUTPATIENT
Start: 2024-01-04 | End: 2024-07-02

## 2024-01-31 DIAGNOSIS — E88.819 INSULIN RESISTANCE: ICD-10-CM

## 2024-01-31 RX ORDER — METFORMIN HYDROCHLORIDE 500 MG/1
500 TABLET, EXTENDED RELEASE ORAL DAILY
Qty: 30 TABLET | Refills: 3 | Status: SHIPPED | OUTPATIENT
Start: 2024-01-31

## 2024-02-14 ENCOUNTER — HOSPITAL ENCOUNTER (OUTPATIENT)
Age: 24
Setting detail: SPECIMEN
Discharge: HOME | End: 2024-02-14

## 2024-02-14 ENCOUNTER — OFFICE VISIT (OUTPATIENT)
Dept: FAMILY MEDICINE CLINIC | Age: 24
End: 2024-02-14
Payer: COMMERCIAL

## 2024-02-14 VITALS
HEIGHT: 62 IN | OXYGEN SATURATION: 99 % | TEMPERATURE: 97.7 F | HEART RATE: 101 BPM | SYSTOLIC BLOOD PRESSURE: 110 MMHG | DIASTOLIC BLOOD PRESSURE: 62 MMHG | BODY MASS INDEX: 25.76 KG/M2 | WEIGHT: 140 LBS

## 2024-02-14 DIAGNOSIS — M25.50 MULTIPLE JOINT PAIN: ICD-10-CM

## 2024-02-14 DIAGNOSIS — R53.83 FATIGUE, UNSPECIFIED TYPE: ICD-10-CM

## 2024-02-14 DIAGNOSIS — F32.A ANXIETY AND DEPRESSION: ICD-10-CM

## 2024-02-14 DIAGNOSIS — F41.9 ANXIETY AND DEPRESSION: ICD-10-CM

## 2024-02-14 DIAGNOSIS — M25.50 MULTIPLE JOINT PAIN: Primary | ICD-10-CM

## 2024-02-14 DIAGNOSIS — R45.4 ANGER: ICD-10-CM

## 2024-02-14 LAB
ERYTHROCYTE [SEDIMENTATION RATE] IN BLOOD BY PHOTOMETRIC METHOD: 2 MM/HR (ref 0–20)
IRON SATN MFR SERPL: 22 % (ref 20–55)
IRON SERPL-MCNC: 68 UG/DL (ref 37–145)
RHEUMATOID FACT SER NEPH-ACNC: <10 IU/ML
TIBC SERPL-MCNC: 304 UG/DL (ref 250–450)
UNSATURATED IRON BINDING CAPACITY: 236 UG/DL (ref 112–347)
VIT B12 SERPL-MCNC: 771 PG/ML (ref 232–1245)

## 2024-02-14 PROCEDURE — G8484 FLU IMMUNIZE NO ADMIN: HCPCS | Performed by: PHYSICIAN ASSISTANT

## 2024-02-14 PROCEDURE — G8417 CALC BMI ABV UP PARAM F/U: HCPCS | Performed by: PHYSICIAN ASSISTANT

## 2024-02-14 PROCEDURE — G8427 DOCREV CUR MEDS BY ELIG CLIN: HCPCS | Performed by: PHYSICIAN ASSISTANT

## 2024-02-14 PROCEDURE — 99213 OFFICE O/P EST LOW 20 MIN: CPT | Performed by: PHYSICIAN ASSISTANT

## 2024-02-14 PROCEDURE — 4004F PT TOBACCO SCREEN RCVD TLK: CPT | Performed by: PHYSICIAN ASSISTANT

## 2024-02-14 NOTE — PROGRESS NOTES
Visit Information    Have you changed or started any medications since your last visit including any over-the-counter medicines, vitamins, or herbal medicines? no   Are you having any side effects from any of your medications? -  no  Have you stopped taking any of your medications? Is so, why? -  no    Have you seen any other physician or provider since your last visit? No  Have you had any other diagnostic tests since your last visit? No  Have you been seen in the emergency room and/or had an admission to a hospital since we last saw you? No  Have you had your routine dental cleaning in the past 6 months? no    Have you activated your [facility] account? If not, what are your barriers? Yes     Patient Care Team:  Nathaly To APRN - CNP as PCP - General (Family Nurse Practitioner)  Nathaly To APRN - CNP as PCP - Empaneled Provider  Lulu Denny APRN - CNP as Nurse Practitioner (Family Nurse Practitioner)    Medical History Review  Past Medical, Family, and Social History reviewed and  contribute to the patient presenting condition    Health Maintenance   Topic Date Due    COVID-19 Vaccine (1) Never done    Pneumococcal 0-64 years Vaccine (1 - PCV) Never done    HIV screen  Never done    HPV vaccine (2 - 3-dose series) 09/28/2015    Hepatitis A vaccine (2 of 2 - 2-dose series) 02/29/2016    Hepatitis C screen  Never done    DTaP/Tdap/Td vaccine (7 - Td or Tdap) 08/29/2022    Chlamydia/GC screen  01/25/2023    Flu vaccine (1) Never done    Depression Monitoring  05/26/2024    Pap smear  01/25/2025    Hepatitis B vaccine  Completed    Polio vaccine  Completed    Varicella vaccine  Completed    Meningococcal (ACWY) vaccine  Completed    Hib vaccine  Aged Out

## 2024-02-14 NOTE — PROGRESS NOTES
[facility]  [facility]  [address] ([facility] [address]  Dept: [phone]  Dept Fax: [phone]    Ina Gaspar is a 23 y.o. female who presents today for her medical conditions/complaintsas noted below.  Ina Gaspar is c/o of   Chief Complaint   Patient presents with    Chronic Pain     On going - pain all over         HPI:     HPI    Patient is here reporting pain and \"fatigue\" all over her body. Her back is the worst but she feels like her legs and arms get tired easily. The last 3 months have been worse than normal. Patient states 3 years ago is when these symptoms initially started.   Patient denies any joint pain/swelling. Symptoms worsen as the day goes on. Patient states she is not physically active. Not presently working a job or going to school. States she is not physically active.   She does have a family history of RA-maternal grandfather.     Patient also reports she has been feeling more anger the last 6 months. She was seeing a therapist. She also had seen a psychiatrist through [place]. She is asking for a referral to a different office if possible.   Patient states no suicidal thoughts. She is reporting no thoughts of self or other harm.      Hemoglobin A1C (%)   Date Value   09/28/2022 4.7   12/16/2020 5.0   11/06/2020 4.9             ( goal A1Cis < 7)   No components found for: \"LABMICR\"  No results found for: \"LDLCHOLESTEROL\", \"LDLCALC\"    (goal LDL is <100)   AST (U/L)   Date Value   12/18/2023 20     ALT (U/L)   Date Value   12/18/2023 12     BUN (mg/dL)   Date Value   12/18/2023 5 (L)     BP Readings from Last 3 Encounters:   02/14/24 110/62   12/18/23 130/79   09/13/23 102/64          (goal 120/80)    Past Medical History:   Diagnosis Date    Aneurysm of internal carotid artery     Anxiety     Asthma     Carotid aneurysm, left (HCC)     Headache     History of angiography 06/24/2021    CEREBRAL ANGIOGRAM    Moderate

## 2024-02-15 LAB
25(OH)D3 SERPL-MCNC: 35.4 NG/ML
ANA SER QL IA: NEGATIVE
DSDNA IGG SER QL IA: 0.7 IU/ML
NUCLEAR IGG SER IA-RTO: 0.4 U/ML

## 2024-02-20 DIAGNOSIS — E53.8 B12 DEFICIENCY: ICD-10-CM

## 2024-02-20 RX ORDER — MONTELUKAST SODIUM 10 MG/1
10 TABLET ORAL NIGHTLY
Qty: 90 TABLET | Refills: 1 | OUTPATIENT
Start: 2024-02-20

## 2024-02-20 RX ORDER — MONTELUKAST SODIUM 10 MG/1
TABLET ORAL
Qty: 90 TABLET | Refills: 1 | OUTPATIENT
Start: 2024-02-20

## 2024-02-20 RX ORDER — LANOLIN ALCOHOL/MO/W.PET/CERES
1000 CREAM (GRAM) TOPICAL DAILY
Qty: 30 TABLET | Refills: 0 | OUTPATIENT
Start: 2024-02-20

## 2024-03-06 RX ORDER — MONTELUKAST SODIUM 10 MG/1
TABLET ORAL
Qty: 90 TABLET | Refills: 1 | Status: SHIPPED | OUTPATIENT
Start: 2024-03-06

## 2024-03-28 ENCOUNTER — APPOINTMENT (OUTPATIENT)
Dept: ULTRASOUND IMAGING | Age: 24
End: 2024-03-28
Payer: COMMERCIAL

## 2024-03-28 ENCOUNTER — HOSPITAL ENCOUNTER (EMERGENCY)
Age: 24
Discharge: HOME | End: 2024-03-28
Attending: EMERGENCY MEDICINE
Payer: COMMERCIAL

## 2024-03-28 VITALS
OXYGEN SATURATION: 99 % | SYSTOLIC BLOOD PRESSURE: 149 MMHG | HEART RATE: 69 BPM | BODY MASS INDEX: 24.51 KG/M2 | RESPIRATION RATE: 16 BRPM | DIASTOLIC BLOOD PRESSURE: 86 MMHG | TEMPERATURE: 98.6 F | WEIGHT: 134 LBS

## 2024-03-28 DIAGNOSIS — O21.9 VOMITING OF PREGNANCY, ANTEPARTUM: Primary | ICD-10-CM

## 2024-03-28 DIAGNOSIS — O46.90 VAGINAL BLEEDING DURING PREGNANCY: ICD-10-CM

## 2024-03-28 LAB
ALBUMIN SERPL-MCNC: 4.5 G/DL (ref 3.5–5.2)
ALP SERPL-CCNC: 57 U/L (ref 35–104)
ALT SERPL-CCNC: 10 U/L (ref 5–33)
ANION GAP SERPL CALCULATED.3IONS-SCNC: 16 MMOL/L (ref 9–17)
AST SERPL-CCNC: 13 U/L
B-HCG SERPL EIA 3RD IS-ACNC: 47.1 MIU/ML
BASOPHILS # BLD: 0.03 K/UL (ref 0–0.2)
BASOPHILS NFR BLD: 0 % (ref 0–2)
BILIRUB SERPL-MCNC: 0.4 MG/DL (ref 0.3–1.2)
BILIRUB UR QL STRIP: ABNORMAL
BUN SERPL-MCNC: 10 MG/DL (ref 6–20)
BUN/CREAT SERPL: 20 (ref 9–20)
CALCIUM SERPL-MCNC: 9.2 MG/DL (ref 8.6–10.4)
CANDIDA SPECIES: NEGATIVE
CHLORIDE SERPL-SCNC: 103 MMOL/L (ref 98–107)
CLARITY UR: CLEAR
CO2 SERPL-SCNC: 22 MMOL/L (ref 20–31)
COLOR UR: YELLOW
CREAT SERPL-MCNC: 0.5 MG/DL (ref 0.5–0.9)
EOSINOPHIL # BLD: <0.03 K/UL (ref 0–0.44)
EOSINOPHILS RELATIVE PERCENT: 0 % (ref 1–4)
EPI CELLS #/AREA URNS HPF: NORMAL /HPF (ref 0–5)
ERYTHROCYTE [DISTWIDTH] IN BLOOD BY AUTOMATED COUNT: 12.4 % (ref 11.8–14.4)
GARDNERELLA VAGINALIS: NEGATIVE
GFR SERPL CREATININE-BSD FRML MDRD: >90 ML/MIN/1.73M2
GLUCOSE SERPL-MCNC: 93 MG/DL (ref 70–99)
GLUCOSE UR STRIP-MCNC: NEGATIVE MG/DL
HCG UR QL: POSITIVE
HCT VFR BLD AUTO: 38.6 % (ref 36.3–47.1)
HGB BLD-MCNC: 12.5 G/DL (ref 11.9–15.1)
HGB UR QL STRIP.AUTO: ABNORMAL
IMM GRANULOCYTES # BLD AUTO: 0.01 K/UL (ref 0–0.3)
IMM GRANULOCYTES NFR BLD: 0 %
KETONES UR STRIP-MCNC: ABNORMAL MG/DL
LACTATE BLDV-SCNC: 1.1 MMOL/L (ref 0.5–2.2)
LEUKOCYTE ESTERASE UR QL STRIP: NEGATIVE
LIPASE SERPL-CCNC: 13 U/L (ref 13–60)
LYMPHOCYTES NFR BLD: 1.24 K/UL (ref 1.1–3.7)
LYMPHOCYTES RELATIVE PERCENT: 16 % (ref 24–43)
MCH RBC QN AUTO: 28 PG (ref 25.2–33.5)
MCHC RBC AUTO-ENTMCNC: 32.4 G/DL (ref 28.4–34.8)
MCV RBC AUTO: 86.5 FL (ref 82.6–102.9)
MONOCYTES NFR BLD: 0.25 K/UL (ref 0.1–1.2)
MONOCYTES NFR BLD: 3 % (ref 3–12)
NEUTROPHILS NFR BLD: 81 % (ref 36–65)
NEUTS SEG NFR BLD: 6.45 K/UL (ref 1.5–8.1)
NITRITE UR QL STRIP: NEGATIVE
NRBC BLD-RTO: 0 PER 100 WBC
PH UR STRIP: 6 [PH] (ref 5–8)
PLATELET # BLD AUTO: 261 K/UL (ref 138–453)
PMV BLD AUTO: 11.7 FL (ref 8.1–13.5)
POTASSIUM SERPL-SCNC: 4 MMOL/L (ref 3.7–5.3)
PROT SERPL-MCNC: 7.1 G/DL (ref 6.4–8.3)
PROT UR STRIP-MCNC: NEGATIVE MG/DL
RBC # BLD AUTO: 4.46 M/UL (ref 3.95–5.11)
RBC #/AREA URNS HPF: NORMAL /HPF (ref 0–2)
SODIUM SERPL-SCNC: 141 MMOL/L (ref 135–144)
SOURCE: NORMAL
SP GR UR STRIP: 1.04 (ref 1–1.03)
TRICHOMONAS: NEGATIVE
UROBILINOGEN UR STRIP-ACNC: NORMAL EU/DL (ref 0–1)
WBC #/AREA URNS HPF: NORMAL /HPF (ref 0–5)
WBC OTHER # BLD: 8 K/UL (ref 3.5–11.3)

## 2024-03-28 PROCEDURE — 96376 TX/PRO/DX INJ SAME DRUG ADON: CPT

## 2024-03-28 PROCEDURE — 83605 ASSAY OF LACTIC ACID: CPT

## 2024-03-28 PROCEDURE — 87480 CANDIDA DNA DIR PROBE: CPT

## 2024-03-28 PROCEDURE — 84702 CHORIONIC GONADOTROPIN TEST: CPT

## 2024-03-28 PROCEDURE — 6360000002 HC RX W HCPCS: Performed by: EMERGENCY MEDICINE

## 2024-03-28 PROCEDURE — 87591 N.GONORRHOEAE DNA AMP PROB: CPT

## 2024-03-28 PROCEDURE — 87510 GARDNER VAG DNA DIR PROBE: CPT

## 2024-03-28 PROCEDURE — 36415 COLL VENOUS BLD VENIPUNCTURE: CPT

## 2024-03-28 PROCEDURE — 96375 TX/PRO/DX INJ NEW DRUG ADDON: CPT

## 2024-03-28 PROCEDURE — 99284 EMERGENCY DEPT VISIT MOD MDM: CPT

## 2024-03-28 PROCEDURE — 83690 ASSAY OF LIPASE: CPT

## 2024-03-28 PROCEDURE — 76817 TRANSVAGINAL US OBSTETRIC: CPT

## 2024-03-28 PROCEDURE — 81025 URINE PREGNANCY TEST: CPT

## 2024-03-28 PROCEDURE — 87491 CHLMYD TRACH DNA AMP PROBE: CPT

## 2024-03-28 PROCEDURE — 80053 COMPREHEN METABOLIC PANEL: CPT

## 2024-03-28 PROCEDURE — 96374 THER/PROPH/DIAG INJ IV PUSH: CPT

## 2024-03-28 PROCEDURE — 85025 COMPLETE CBC W/AUTO DIFF WBC: CPT

## 2024-03-28 PROCEDURE — 87660 TRICHOMONAS VAGIN DIR PROBE: CPT

## 2024-03-28 PROCEDURE — 81001 URINALYSIS AUTO W/SCOPE: CPT

## 2024-03-28 RX ORDER — ONDANSETRON 4 MG/1
4 TABLET, ORALLY DISINTEGRATING ORAL 3 TIMES DAILY PRN
Qty: 10 TABLET | Refills: 0 | Status: SHIPPED | OUTPATIENT
Start: 2024-03-28

## 2024-03-28 RX ORDER — MORPHINE SULFATE 4 MG/ML
4 INJECTION, SOLUTION INTRAMUSCULAR; INTRAVENOUS ONCE
Status: COMPLETED | OUTPATIENT
Start: 2024-03-28 | End: 2024-03-28

## 2024-03-28 RX ORDER — ONDANSETRON 2 MG/ML
4 INJECTION INTRAMUSCULAR; INTRAVENOUS ONCE
Status: COMPLETED | OUTPATIENT
Start: 2024-03-28 | End: 2024-03-28

## 2024-03-28 RX ADMIN — MORPHINE SULFATE 4 MG: 4 INJECTION, SOLUTION INTRAMUSCULAR; INTRAVENOUS at 18:15

## 2024-03-28 RX ADMIN — ONDANSETRON 4 MG: 2 INJECTION INTRAMUSCULAR; INTRAVENOUS at 18:15

## 2024-03-28 RX ADMIN — ONDANSETRON 4 MG: 2 INJECTION INTRAMUSCULAR; INTRAVENOUS at 21:48

## 2024-03-28 ASSESSMENT — PAIN SCALES - GENERAL
PAINLEVEL_OUTOF10: 10
PAINLEVEL_OUTOF10: 10

## 2024-03-28 ASSESSMENT — PAIN - FUNCTIONAL ASSESSMENT: PAIN_FUNCTIONAL_ASSESSMENT: 0-10

## 2024-03-28 NOTE — ED PROVIDER NOTES
[facility] ED  Emergency Department Encounter  Emergency Medicine Physician Note     Pt Name:Ina Gaspar  MRN: [id number]  Birthdate 2000  Date of evaluation: 3/28/24  PCP:  Arpita Ortiz PA-C  Note Started: 5:31 PM EDT      CHIEF COMPLAINT       Chief Complaint   Patient presents with    Abdominal Pain     Lower. Started last night    Emesis     Started last night. Unable to keep anything down. Took reglan without relief.       HISTORY OF PRESENT ILLNESS  (Location/Symptom, Timing/Onset, Context/Setting, Quality, Duration, Modifying Factors, Severity.)      Ina Gaspar is a 23 y.o. female who presents with abdominal pain that started last night, patient describes lower quadrant abdominal pain.  Patient also describes associated vomiting.  Patient noted some nausea medication with no improvement.  Patient describes pain as severe, 10 out of 10.  Patient has had no surgeries on her abdomen.  Patient denies any being pregnant, is currently on her menses.  Patient states that on time.  Patient denies any fevers or chills chest pain or shortness of breath.    PAST MEDICAL / SURGICAL / SOCIAL / FAMILY HISTORY      has a past medical history of Aneurysm of internal carotid artery, Anxiety, Asthma, Carotid aneurysm, left (HCC), Headache, History of angiography, and Moderate episode of recurrent major depressive disorder (HCC).  No additional pertinent        has a past surgical history that includes Eye surgery; other surgical history (12/16/2020); and Cardiac catheterization (12/17/2020).  No additional pertinent       Social History     Socioeconomic History    Marital status:      Spouse name: Not on file    Number of children: Not on file    Years of education: Not on file    Highest education level: Not on file   Occupational History    Not on file   Tobacco Use    Smoking status: Some Days     Types: E-Cigarettes    Smokeless tobacco: Never    Tobacco comments:     Stopped

## 2024-03-29 LAB
C TRACH DNA SPEC QL PROBE+SIG AMP: NEGATIVE
N GONORRHOEA DNA SPEC QL PROBE+SIG AMP: NEGATIVE
SPECIMEN DESCRIPTION: NORMAL

## 2024-03-29 ASSESSMENT — ENCOUNTER SYMPTOMS
CHEST TIGHTNESS: 0
ABDOMINAL PAIN: 1
NAUSEA: 1
VOMITING: 1

## 2024-03-29 NOTE — ED NOTES
Dr. Payne at bedside to hui pt.  
Dr. Payne at bedside to update pt.   
Dr. Payne at bedside.   
Labeled urine specimen sent to lab.  
Pt ambulatory to bathroom with steady gait.  
Pt ambulatory with steady gait back to room 17 from bathroom.   
Pt medicated as ordered and documented. Pt placed on continuous SpO2. Lights turned out in room for pt comfort. Pt aware of need for urine specimen and bathroom location. Denies further needs at this time.  
Pt reports hx of IBS for which she sees GI and takes Reglan prn. Reports diarrhea yesterday which is normal for her with onset of vomiting last night which is not normal for her. States she took a dose of Reglan last night after onset of emesis, but it did not improve her symptoms. Reports vomiting t/o the night. States she tried to drink juice this afternoon because she doesn't like water or ginger ale and vomited - last emesis approx 30 min pta. Pt reports next appt with GI specialist is \"26 days from now\" and states she's too miserable to wait until then for relief. No active emesis noted at this time.   
Ultrasound at bedside.   
[de-identified] : 9F with L MF PIP sprain - basketball injury 1/18/23\par \par matthew strapping as demonstrated\par elevate L hand for swelling\par gentle rom, no lifting\par no gym/sports\par return one week with Dr. Kilgore

## 2024-03-29 NOTE — DISCHARGE INSTRUCTIONS
Call today or tomorrow to follow up with Arpita Ortiz PA-C or your OB/GYN in 3 days.  Call them tomorrow for an appointment.    Ectopic pregnancy has not been completely ruled out in your pregnancy.  You should follow up with your OB / GYN as indicated or call for an appointment tomorrow.  If you are unable to get an appointment within 48 hours then you should return to the emergency department for additional blood test.    You should immediately return to the Emergency Department for worsening of vaginal bleeding, using more than 4 pads per hour, feeling of weakness, dizzy, nausea / vomiting, any other care or concern.

## 2024-03-30 ENCOUNTER — HOSPITAL ENCOUNTER (OUTPATIENT)
Facility: CLINIC | Age: 24
Discharge: HOME | End: 2024-03-30
Payer: COMMERCIAL

## 2024-03-30 LAB — B-HCG SERPL EIA 3RD IS-ACNC: 36 MIU/ML

## 2024-03-30 PROCEDURE — 36415 COLL VENOUS BLD VENIPUNCTURE: CPT

## 2024-03-30 PROCEDURE — 84702 CHORIONIC GONADOTROPIN TEST: CPT

## 2024-04-01 DIAGNOSIS — Z32.01 POSITIVE PREGNANCY TEST: Primary | ICD-10-CM

## 2024-06-12 ENCOUNTER — TELEPHONE (OUTPATIENT)
Dept: FAMILY MEDICINE CLINIC | Age: 24
End: 2024-06-12

## 2024-06-12 NOTE — TELEPHONE ENCOUNTER
----- Message from Viktor Farfan sent at 6/12/2024  8:55 AM EDT -----  Regarding: ECC Referral Request  ECC Referral Request    Reason for referral request: Specialty Provider    Specialist/Lab/Test patient is requesting (if known): Orthopedist.    Specialist Phone Number (if applicable):    Additional Information PT is requesting a referral for a back doctor  --------------------------------------------------------------------------------------------------------------------------    Relationship to Patient: Self     Call Back Information: OK to leave message on voicemail  Preferred Call Back Number: Phone [phone] (home) [phone] (work)

## 2024-06-20 ENCOUNTER — OFFICE VISIT (OUTPATIENT)
Dept: FAMILY MEDICINE CLINIC | Age: 24
End: 2024-06-20
Payer: COMMERCIAL

## 2024-06-20 VITALS
TEMPERATURE: 99 F | WEIGHT: 129.6 LBS | BODY MASS INDEX: 23.85 KG/M2 | HEART RATE: 64 BPM | DIASTOLIC BLOOD PRESSURE: 64 MMHG | HEIGHT: 62 IN | SYSTOLIC BLOOD PRESSURE: 100 MMHG

## 2024-06-20 DIAGNOSIS — M54.42 ACUTE LEFT-SIDED LOW BACK PAIN WITH LEFT-SIDED SCIATICA: Primary | ICD-10-CM

## 2024-06-20 PROBLEM — F51.01 PRIMARY INSOMNIA: Status: ACTIVE | Noted: 2024-06-20

## 2024-06-20 PROBLEM — K58.0 IRRITABLE BOWEL SYNDROME WITH DIARRHEA: Status: ACTIVE | Noted: 2024-06-20

## 2024-06-20 PROBLEM — J45.20 MILD INTERMITTENT ASTHMA: Status: ACTIVE | Noted: 2024-06-20

## 2024-06-20 PROBLEM — G47.33 OBSTRUCTIVE SLEEP APNEA SYNDROME: Status: ACTIVE | Noted: 2024-06-20

## 2024-06-20 PROBLEM — E55.9 VITAMIN D DEFICIENCY: Status: ACTIVE | Noted: 2024-06-20

## 2024-06-20 PROBLEM — E88.819 INSULIN RESISTANCE: Status: ACTIVE | Noted: 2024-06-20

## 2024-06-20 PROCEDURE — 99213 OFFICE O/P EST LOW 20 MIN: CPT | Performed by: NURSE PRACTITIONER

## 2024-06-20 RX ORDER — PAROXETINE 10 MG/1
10 TABLET, FILM COATED ORAL EVERY MORNING
COMMUNITY

## 2024-06-20 RX ORDER — BACLOFEN 5 MG/1
10 TABLET ORAL 3 TIMES DAILY PRN
Qty: 30 TABLET | Refills: 0 | Status: SHIPPED | OUTPATIENT
Start: 2024-06-20

## 2024-06-20 RX ORDER — FAMOTIDINE 20 MG/1
20 TABLET, FILM COATED ORAL 2 TIMES DAILY
Qty: 60 TABLET | Refills: 5 | Status: SHIPPED | OUTPATIENT
Start: 2024-06-20

## 2024-06-20 RX ORDER — BUSPIRONE HYDROCHLORIDE 10 MG/1
10 TABLET ORAL 3 TIMES DAILY
COMMUNITY

## 2024-06-20 RX ORDER — PREDNISONE 20 MG/1
20 TABLET ORAL 2 TIMES DAILY
Qty: 10 TABLET | Refills: 0 | Status: SHIPPED | OUTPATIENT
Start: 2024-06-20 | End: 2024-06-25

## 2024-06-20 SDOH — ECONOMIC STABILITY: FOOD INSECURITY: WITHIN THE PAST 12 MONTHS, THE FOOD YOU BOUGHT JUST DIDN'T LAST AND YOU DIDN'T HAVE MONEY TO GET MORE.: NEVER TRUE

## 2024-06-20 SDOH — ECONOMIC STABILITY: FOOD INSECURITY: WITHIN THE PAST 12 MONTHS, YOU WORRIED THAT YOUR FOOD WOULD RUN OUT BEFORE YOU GOT MONEY TO BUY MORE.: NEVER TRUE

## 2024-06-20 SDOH — ECONOMIC STABILITY: INCOME INSECURITY: HOW HARD IS IT FOR YOU TO PAY FOR THE VERY BASICS LIKE FOOD, HOUSING, MEDICAL CARE, AND HEATING?: NOT HARD AT ALL

## 2024-06-20 ASSESSMENT — ENCOUNTER SYMPTOMS
SHORTNESS OF BREATH: 0
BACK PAIN: 1

## 2024-06-20 NOTE — PROGRESS NOTES
Visit Information    Have you changed or started any medications since your last visit including any over-the-counter medicines, vitamins, or herbal medicines? no   Have you stopped taking any of your medications? Is so, why? -  no  Are you having any side effects from any of your medications? - no    Have you seen any other physician or provider since your last visit?  no   Have you had any other diagnostic tests since your last visit?  no   Have you been seen in the emergency room and/or had an admission in a hospital since we last saw you?  no   Have you had your routine dental cleaning in the past 6 months?  no     Do you have an active MyChart account? If no, what is the barrier?  Yes    Patient Care Team:  Arpita Ortiz PA-C as PCP - General (Physician Assistant)  Arpita Ortiz PA-C as PCP - Empaneled Provider  Lulu Denny APRN - CNP as Nurse Practitioner (Family Nurse Practitioner)    Medical History Review  Past Medical, Family, and Social History reviewed and  contribute to the patient presenting condition    Health Maintenance   Topic Date Due    COVID-19 Vaccine (1) Never done    Pneumococcal 0-64 years Vaccine (1 of 2 - PCV) Never done    HIV screen  Never done    HPV vaccine (2 - 3-dose series) 09/28/2015    Hepatitis A vaccine (2 of 2 - 2-dose series) 02/29/2016    Hepatitis C screen  Never done    DTaP/Tdap/Td vaccine (7 - Td or Tdap) 08/29/2022    Flu vaccine (Season Ended) 08/01/2024    Pap smear  01/25/2025    Depression Monitoring  02/14/2025    Chlamydia/GC screen  03/28/2025    Hepatitis B vaccine  Completed    Polio vaccine  Completed    Varicella vaccine  Completed    Meningococcal (ACWY) vaccine  Completed    Hib vaccine  Aged Out             
12/16/2020    cerebral angiogram, stent placed     Family History   Problem Relation Age of Onset    Diabetes Maternal Cousin     No Known Problems Mother     Anemia Father     Breast Cancer Paternal Grandmother     Lung Cancer Paternal Grandfather     Heart Disease Sister     Other Paternal Aunt         cerebral aneurysm     Social History     Tobacco Use    Smoking status: Some Days     Types: E-Cigarettes    Smokeless tobacco: Never    Tobacco comments:     Stopped cigarattes in December, have been vaping since   Substance Use Topics    Alcohol use: Not Currently      Current Outpatient Medications   Medication Sig Dispense Refill    famotidine (PEPCID) 20 MG tablet TAKE 1 TABLET BY MOUTH TWICE A DAY 60 tablet 5    busPIRone (BUSPAR) 10 MG tablet Take 1 tablet by mouth 3 times daily      PARoxetine (PAXIL) 10 MG tablet Take 1 tablet by mouth every morning      Baclofen (LIORESAL) 5 MG tablet Take 2 tablets by mouth 3 times daily as needed (back pain) 30 tablet 0    predniSONE (DELTASONE) 20 MG tablet Take 1 tablet by mouth 2 times daily for 5 days 10 tablet 0    ondansetron (ZOFRAN-ODT) 4 MG disintegrating tablet Take 1 tablet by mouth 3 times daily as needed for Nausea or Vomiting 10 tablet 0    montelukast (SINGULAIR) 10 MG tablet TAKE ONE TABLET BY MOUTH ONCE NIGHTLY 90 tablet 1    metFORMIN (GLUCOPHAGE-XR) 500 MG extended release tablet TAKE 1 TABLET BY MOUTH DAILY WITH BREAKFAST 30 tablet 3    aspirin (ASPIRIN LOW DOSE) 81 MG EC tablet Take 1 tablet by mouth daily 30 tablet 5    albuterol (PROVENTIL) (2.5 MG/3ML) 0.083% nebulizer solution 3 ml as needed Inhalation every 8 hrs for 30 day(s)      magnesium oxide (MAG-OX) 400 (240 Mg) MG tablet       naratriptan (AMERGE) 2.5 MG tablet       vitamin B-12 (CYANOCOBALAMIN) 1000 MCG tablet TAKE 1 TABLET BY MOUTH DAILY 30 tablet 0    loratadine (CLARITIN) 10 MG tablet TAKE ONE TABLET BY MOUTH ONCE NIGHTLY 90 tablet 5    traZODone (DESYREL) 50 MG tablet Take 1 tablet

## 2024-06-24 ENCOUNTER — TELEPHONE (OUTPATIENT)
Dept: FAMILY MEDICINE CLINIC | Age: 24
End: 2024-06-24

## 2024-06-24 DIAGNOSIS — M54.42 ACUTE LEFT-SIDED LOW BACK PAIN WITH LEFT-SIDED SCIATICA: ICD-10-CM

## 2024-06-24 RX ORDER — BACLOFEN 5 MG/1
10 TABLET ORAL 3 TIMES DAILY PRN
Qty: 90 TABLET | Refills: 0 | Status: SHIPPED | OUTPATIENT
Start: 2024-06-24

## 2024-06-24 RX ORDER — ASPIRIN 81 MG/1
81 TABLET, COATED ORAL DAILY
Qty: 30 TABLET | Refills: 5 | Status: SHIPPED | OUTPATIENT
Start: 2024-06-24

## 2024-06-24 NOTE — TELEPHONE ENCOUNTER
Rob denial stating baclofen denied because of quantity, will fill up to #90 for a month, letter is very confusing, think they are asking for script to be at least 90.  Letter under media

## 2024-10-07 ENCOUNTER — TELEPHONE (OUTPATIENT)
Dept: OBGYN CLINIC | Age: 24
End: 2024-10-07

## 2024-10-07 NOTE — TELEPHONE ENCOUNTER
OB Transfer per Dr. Cooper-was patient but transferred and wants to transfer back    JOSEPHINE 4/1/25 scheduled c/s 3/26/25 HR due to brain aneurisms. Per patient no issues so far with this pregnancy. First available is 11/1/24 with Dr. Waite. Patient will continue to see her current provider at [facility] until this appointment.

## 2024-11-01 ENCOUNTER — INITIAL PRENATAL (OUTPATIENT)
Dept: OBGYN CLINIC | Age: 24
End: 2024-11-01

## 2024-11-01 VITALS — SYSTOLIC BLOOD PRESSURE: 116 MMHG | WEIGHT: 136.8 LBS | BODY MASS INDEX: 25.02 KG/M2 | DIASTOLIC BLOOD PRESSURE: 82 MMHG

## 2024-11-01 DIAGNOSIS — O99.342 DEPRESSION DURING PREGNANCY IN SECOND TRIMESTER: ICD-10-CM

## 2024-11-01 DIAGNOSIS — Z3A.17 17 WEEKS GESTATION OF PREGNANCY: Primary | ICD-10-CM

## 2024-11-01 DIAGNOSIS — F32.A DEPRESSION DURING PREGNANCY IN SECOND TRIMESTER: ICD-10-CM

## 2024-11-01 DIAGNOSIS — J45.40 MODERATE PERSISTENT ASTHMA WITHOUT COMPLICATION: ICD-10-CM

## 2024-11-01 DIAGNOSIS — E88.819 INSULIN RESISTANCE: ICD-10-CM

## 2024-11-01 PROBLEM — J45.20 MILD INTERMITTENT ASTHMA: Status: RESOLVED | Noted: 2024-06-20 | Resolved: 2024-11-01

## 2024-11-01 PROCEDURE — 0502F SUBSEQUENT PRENATAL CARE: CPT | Performed by: STUDENT IN AN ORGANIZED HEALTH CARE EDUCATION/TRAINING PROGRAM

## 2024-11-01 RX ORDER — MONTELUKAST SODIUM 10 MG/1
TABLET ORAL
Qty: 90 TABLET | Refills: 1 | Status: SHIPPED | OUTPATIENT
Start: 2024-11-01

## 2024-11-01 RX ORDER — BUSPIRONE HYDROCHLORIDE 10 MG/1
10 TABLET ORAL 3 TIMES DAILY
Qty: 30 TABLET | Refills: 3 | Status: SHIPPED | OUTPATIENT
Start: 2024-11-01

## 2024-11-01 RX ORDER — ALBUTEROL SULFATE 0.83 MG/ML
2.5 SOLUTION RESPIRATORY (INHALATION) EVERY 6 HOURS PRN
Qty: 120 EACH | Refills: 0 | Status: SHIPPED | OUTPATIENT
Start: 2024-11-01

## 2024-11-01 RX ORDER — LANOLIN ALCOHOL/MO/W.PET/CERES
400 CREAM (GRAM) TOPICAL DAILY
Qty: 30 TABLET | Refills: 3 | Status: SHIPPED | OUTPATIENT
Start: 2024-11-01

## 2024-11-01 NOTE — PROGRESS NOTES
Prenatal Visit    Ina Gaspar is a 24 y.o. female  at Unknown    Subjective:  -Patient is transferring her obstetric care to this office. This is her first visit. She has had appropriate and timely ob care up until this time.   -Of note she has a significant history of cerebral aneurysms and does have coils. Has a neurologist  -Hx HTN? > was on medications about 2-3 years > cHTN   -History insulin resistance and was on metformin for several years   -Good obstetric dating with US at 6w1d > JOSEPHINE 25  - Pap in January normal   - MFM back on  for anatomy scan   - Flu vaccine declined   - Carrier declined   - Normal AFP    Reports some discomfort of pregnancy in the bony pelvis. Discussed maternity support band.     The patient was seen and evaluated. She reports Negative fetal movements. She denies contractions, vaginal bleeding and leakage of fluid. Signs and symptoms of  labor as well as labor were reviewed. Dates were reviewed with the patient. Estimated Date of Delivery: None noted..         Discussed resident involvement in triage and labor and delivery.  Discussed call group.  Patient verbalized understanding.    Vitals:  /82 (Site: Left Upper Arm, Position: Sitting, Cuff Size: Medium Adult)   Wt 62.1 kg (136 lb 12.8 oz)   LMP 2024 (Approximate)   Breastfeeding No   BMI 25.02 kg/m²     Assessment & Plan:  Ina Gaspar is a 24 y.o. female  at Unknown   - Initial prenatal labs were reviewed (care everywhere)   - An 18-22 week anatomy ultrasound has been scheduled    - Influenza and Covid vaccinations recommended per ACOG: R/B/A discussed with increased risk of both maternal and fetal morbidity and mortality in unvaccinated pregnant patients.    - Continue prenatal vitamin    *Transferred obstetric care at 17w5d weeks  *Considering elective primary CS*        FOB Name: Fabiola    Last Pap Smear: 2024 NILM  [] Urine collected for culture   []

## 2024-11-16 ENCOUNTER — HOSPITAL ENCOUNTER (OUTPATIENT)
Age: 24
Discharge: HOME | End: 2024-11-16
Payer: COMMERCIAL

## 2024-11-16 DIAGNOSIS — E88.819 INSULIN RESISTANCE: ICD-10-CM

## 2024-11-16 LAB — GLUCOSE 3H P 100 G GLC PO SERPL-MCNC: 83 MG/DL

## 2024-11-16 PROCEDURE — 82947 ASSAY GLUCOSE BLOOD QUANT: CPT

## 2024-11-16 PROCEDURE — 36415 COLL VENOUS BLD VENIPUNCTURE: CPT

## 2024-12-02 ENCOUNTER — ROUTINE PRENATAL (OUTPATIENT)
Dept: OBGYN CLINIC | Age: 24
End: 2024-12-02

## 2024-12-02 VITALS — SYSTOLIC BLOOD PRESSURE: 120 MMHG | BODY MASS INDEX: 28.79 KG/M2 | WEIGHT: 157.4 LBS | DIASTOLIC BLOOD PRESSURE: 70 MMHG

## 2024-12-02 DIAGNOSIS — O16.9 HYPERTENSION AFFECTING PREGNANCY, ANTEPARTUM: ICD-10-CM

## 2024-12-02 DIAGNOSIS — Z3A.22 22 WEEKS GESTATION OF PREGNANCY: ICD-10-CM

## 2024-12-02 DIAGNOSIS — E88.819 INSULIN RESISTANCE: ICD-10-CM

## 2024-12-02 DIAGNOSIS — O09.92 HIGH-RISK PREGNANCY IN SECOND TRIMESTER: Primary | ICD-10-CM

## 2024-12-02 DIAGNOSIS — Z86.79 HISTORY OF CEREBRAL ANEURYSM: ICD-10-CM

## 2024-12-02 DIAGNOSIS — O99.342 DEPRESSION DURING PREGNANCY IN SECOND TRIMESTER: ICD-10-CM

## 2024-12-02 DIAGNOSIS — F32.A DEPRESSION DURING PREGNANCY IN SECOND TRIMESTER: ICD-10-CM

## 2024-12-02 PROCEDURE — 0502F SUBSEQUENT PRENATAL CARE: CPT | Performed by: NURSE PRACTITIONER

## 2024-12-02 NOTE — PROGRESS NOTES
Presents for OB visit  Gestation 22w1d  Estimated Date of Delivery: 25      *Transferred obstetric care at 17w5d weeks  *Considering elective primary CS*  Insurance: Bcbs    IPV bag/mug given: n/a  Genetic Information given/reviewed: n/a  1st trimester education packet given: n/a  2nd trimester education packet given:  3rd trimester education packet given:      FOB Name: Fabiola    Last Pap Smear: 2024 NILM  [] Urine collected for culture   [] Negative date **   [] Positive date **  [] UDS collected **  [x] Gc/ct collected  > negative Rani Waite DO   [x] Prenatal Labs completed > normal Rani Waite DO     Dating based on   [] LMP  [x] USN  Reviewed by (provider) Rani Waite DO     Genetic Screening:  [x]Accepted NIPS vs FTS   [] Undecided   []Declined   [x]Completed  [x] Low risk   [] Abn for     Carrier Screening:  [] Undecided   [] Accepted   [x] Declined Rani Waite DO 24  [] Known negative CF/SMA/Fragile X  [] Results     Baby aspirin screen:  [x]Positive  []Negative    Early 1 hour GTT: Passed 83  []Yes  [x] No    AFP:   []Ordered  [x]Completed - negative    Anatomy scan:  []Referral Sent  [x]Scheduled 24-TTH MFM  [x]Completed-3-year placenta normal amniotic fluid head circumference 4th percentile rest of growth measurements WNL cervical length measuring 4.27 cm  [x] Follow up has repeat growth with valentino MFM  schedule din 2024    Fetal Echo:   [] Yes  [] No    High Risk Factors:  Hx cerebral aneurysm  - two dissecting left communicating ICA aneurysms   -Transarterial flow diverter stent embolization of the left ICA aneurysm   -Has clips in her head. Has neurolgist. Has not been told she must have  section however, patient is afraid to push for vaginal delivery  -Has been having worsening headaches. Recommended back to neuro for scan  cHTN  -Hx HTN on meds x2-3 yrs. No meds at this time. Keeping cHTN dx  Hx insulin resistance  -unclear

## 2024-12-30 ENCOUNTER — ROUTINE PRENATAL (OUTPATIENT)
Dept: OBGYN CLINIC | Age: 24
End: 2024-12-30

## 2024-12-30 ENCOUNTER — HOSPITAL ENCOUNTER (OUTPATIENT)
Age: 24
Discharge: HOME | End: 2024-12-30
Payer: COMMERCIAL

## 2024-12-30 VITALS — BODY MASS INDEX: 30.91 KG/M2 | DIASTOLIC BLOOD PRESSURE: 62 MMHG | WEIGHT: 169 LBS | SYSTOLIC BLOOD PRESSURE: 118 MMHG

## 2024-12-30 DIAGNOSIS — O09.92 HIGH-RISK PREGNANCY IN SECOND TRIMESTER: ICD-10-CM

## 2024-12-30 DIAGNOSIS — Z3A.22 22 WEEKS GESTATION OF PREGNANCY: ICD-10-CM

## 2024-12-30 DIAGNOSIS — Z86.79 S/P CEREBRAL ANEURYSM REPAIR: ICD-10-CM

## 2024-12-30 DIAGNOSIS — D50.9 IRON DEFICIENCY ANEMIA, UNSPECIFIED IRON DEFICIENCY ANEMIA TYPE: ICD-10-CM

## 2024-12-30 DIAGNOSIS — Z98.890 S/P CEREBRAL ANEURYSM REPAIR: ICD-10-CM

## 2024-12-30 DIAGNOSIS — F33.1 MODERATE EPISODE OF RECURRENT MAJOR DEPRESSIVE DISORDER (HCC): ICD-10-CM

## 2024-12-30 DIAGNOSIS — O09.93 HIGH-RISK PREGNANCY IN THIRD TRIMESTER: ICD-10-CM

## 2024-12-30 DIAGNOSIS — O16.9 HYPERTENSION AFFECTING PREGNANCY, ANTEPARTUM: ICD-10-CM

## 2024-12-30 DIAGNOSIS — Z3A.26 26 WEEKS GESTATION OF PREGNANCY: Primary | ICD-10-CM

## 2024-12-30 LAB
ALBUMIN SERPL-MCNC: 3.5 G/DL (ref 3.5–5.2)
ALBUMIN/GLOB SERPL: 1.3 {RATIO} (ref 1–2.5)
ALP SERPL-CCNC: 63 U/L (ref 35–104)
ALT SERPL-CCNC: 6 U/L (ref 10–35)
AMOUNT GLUCOSE GIVEN: NORMAL G
ANION GAP SERPL CALCULATED.3IONS-SCNC: 12 MMOL/L (ref 9–16)
AST SERPL-CCNC: 16 U/L (ref 10–35)
BASOPHILS # BLD: 0.04 K/UL (ref 0–0.2)
BASOPHILS NFR BLD: 0 % (ref 0–2)
BILIRUB SERPL-MCNC: <0.2 MG/DL (ref 0–1.2)
BILIRUB UR QL STRIP: NEGATIVE
BUN SERPL-MCNC: 8 MG/DL (ref 6–20)
CALCIUM SERPL-MCNC: 8.8 MG/DL (ref 8.6–10.4)
CHLORIDE SERPL-SCNC: 102 MMOL/L (ref 98–107)
CLARITY UR: CLEAR
CO2 SERPL-SCNC: 22 MMOL/L (ref 20–31)
COLOR UR: YELLOW
COMMENT: NORMAL
CREAT SERPL-MCNC: 0.5 MG/DL (ref 0.6–0.9)
CREAT UR-MCNC: 87.8 MG/DL (ref 28–217)
EOSINOPHIL # BLD: 0.11 K/UL (ref 0–0.44)
EOSINOPHILS RELATIVE PERCENT: 1 % (ref 1–4)
ERYTHROCYTE [DISTWIDTH] IN BLOOD BY AUTOMATED COUNT: 13.2 % (ref 11.8–14.4)
GFR, ESTIMATED: >90 ML/MIN/1.73M2
GLUCOSE 3H P 100 G GLC PO SERPL-MCNC: 121 MG/DL
GLUCOSE SERPL-MCNC: 140 MG/DL (ref 74–99)
GLUCOSE UR STRIP-MCNC: NEGATIVE MG/DL
HCT VFR BLD AUTO: 31.4 % (ref 36.3–47.1)
HGB BLD-MCNC: 10 G/DL (ref 11.9–15.1)
HGB UR QL STRIP.AUTO: NEGATIVE
IMM GRANULOCYTES # BLD AUTO: 0.14 K/UL (ref 0–0.3)
IMM GRANULOCYTES NFR BLD: 1 %
KETONES UR STRIP-MCNC: NEGATIVE MG/DL
LEUKOCYTE ESTERASE UR QL STRIP: NEGATIVE
LYMPHOCYTES NFR BLD: 2.67 K/UL (ref 1.1–3.7)
LYMPHOCYTES RELATIVE PERCENT: 24 % (ref 24–43)
MCH RBC QN AUTO: 27.2 PG (ref 25.2–33.5)
MCHC RBC AUTO-ENTMCNC: 31.8 G/DL (ref 28.4–34.8)
MCV RBC AUTO: 85.3 FL (ref 82.6–102.9)
MONOCYTES NFR BLD: 0.5 K/UL (ref 0.1–1.2)
MONOCYTES NFR BLD: 5 % (ref 3–12)
NEUTROPHILS NFR BLD: 69 % (ref 36–65)
NEUTS SEG NFR BLD: 7.48 K/UL (ref 1.5–8.1)
NITRITE UR QL STRIP: NEGATIVE
NRBC BLD-RTO: 0 PER 100 WBC
PH UR STRIP: 7.5 [PH] (ref 5–8)
PLATELET # BLD AUTO: 269 K/UL (ref 138–453)
PMV BLD AUTO: 11.3 FL (ref 8.1–13.5)
POTASSIUM SERPL-SCNC: 3.4 MMOL/L (ref 3.7–5.3)
PROT SERPL-MCNC: 6.3 G/DL (ref 6.6–8.7)
PROT UR STRIP-MCNC: NEGATIVE MG/DL
RBC # BLD AUTO: 3.68 M/UL (ref 3.95–5.11)
SODIUM SERPL-SCNC: 136 MMOL/L (ref 136–145)
SP GR UR STRIP: 1.01 (ref 1–1.03)
TOTAL PROTEIN, URINE: 10 MG/DL
URINE TOTAL PROTEIN CREATININE RATIO: 0.11 (ref 0–0.2)
UROBILINOGEN UR STRIP-ACNC: NORMAL EU/DL (ref 0–1)
WBC OTHER # BLD: 10.9 K/UL (ref 3.5–11.3)

## 2024-12-30 PROCEDURE — 80053 COMPREHEN METABOLIC PANEL: CPT

## 2024-12-30 PROCEDURE — 0502F SUBSEQUENT PRENATAL CARE: CPT | Performed by: STUDENT IN AN ORGANIZED HEALTH CARE EDUCATION/TRAINING PROGRAM

## 2024-12-30 PROCEDURE — 36415 COLL VENOUS BLD VENIPUNCTURE: CPT

## 2024-12-30 PROCEDURE — 82570 ASSAY OF URINE CREATININE: CPT

## 2024-12-30 PROCEDURE — 85025 COMPLETE CBC W/AUTO DIFF WBC: CPT

## 2024-12-30 PROCEDURE — 81003 URINALYSIS AUTO W/O SCOPE: CPT

## 2024-12-30 PROCEDURE — 84156 ASSAY OF PROTEIN URINE: CPT

## 2024-12-30 PROCEDURE — 87086 URINE CULTURE/COLONY COUNT: CPT

## 2024-12-30 NOTE — PROGRESS NOTES
1 hour GTT: Passed 83  []Yes  [x] No    AFP:   []Ordered  [x]Completed - negative    Anatomy scan:  []Referral Sent  [x]Scheduled 24-TTH MFM  [x]Completed: posterior placenta normal amniotic fluid head circumference 4th percentile rest of growth measurements WNL cervical length measuring 4.27 cm  [x] Follow up has repeat growth with valentino MFM  scheduled in 2024    Fetal Echo:   [] Yes  [x] No    High Risk Factors:  Hx cerebral aneurysm  - two dissecting left communicating ICA aneurysms   -Transarterial flow diverter stent embolization of the left ICA aneurysm   -Has clips in her head. Has neurolgist. Has not been told she must have  section however, patient is afraid to push for vaginal delivery  -Has been having worsening headaches. Recommended back to neuro for scan  cHTN  -Hx HTN on meds x 2-3 yrs. No meds at this time. Keeping cHTN dx  Hx insulin resistance  -unclear etiology. Was on metformin until pregnancy and taken off. No early screening. 18 weeks at [facility]. Recommended 2 hr GTT and ordered  Asthma  -controlled with medications  Depression  - was taken off all meds at the start of pregnancy. Worsening symptoms. Recommend restarting buspar. Patient amenable 24  - still hasn't started it as of 2024 re discussed and will start      [x]Placed on High Risk List    Fetal Surveillance:  [x] Yes cHTN  [] No    Covid Vaccine:  Flu Vaccine:  []Given   [x] Declined 24 Rani Waite,    Tdap:  []Given **  [] Declined **  Rhogam: O+  []Given   [x] Not indicated     1hr GTT:  [] Results **  3hr GTT:    []Breast Pump Order Signed/Sent    36 weeks US:  []Completed     GBS:  [] Positive   [] Negative from **    Apts with :  [] Date: ** Gestational Age: **  [] Date: ** Gestational Age: **    Apts with :  [x] Date: 2024  Gestational Age: 17w5d   [x] Date: 2024  Gestational Age: 26w1d            Counseling:  - Reviewed recommendations when to call

## 2024-12-31 LAB
MICROORGANISM SPEC CULT: NORMAL
SERVICE CMNT-IMP: NORMAL
SPECIMEN DESCRIPTION: NORMAL

## 2025-01-13 ENCOUNTER — PATIENT MESSAGE (OUTPATIENT)
Dept: OBGYN CLINIC | Age: 25
End: 2025-01-13

## 2025-01-15 NOTE — TELEPHONE ENCOUNTER
Danny Buckley, whatever if your babies normal amount of movements is what is considered normal. If it changes from that then stop what you are doing and focus on the movements and kick counts. Sometimes if you are busy and distracted you won't notice the movements as much. If you ever have concerns outside of office hours or not feeling the baby move then go to [facility] labor and delivery.

## 2025-01-21 ENCOUNTER — HOSPITAL ENCOUNTER (OUTPATIENT)
Age: 25
Discharge: HOME | End: 2025-01-21
Payer: COMMERCIAL

## 2025-01-21 DIAGNOSIS — D50.9 IRON DEFICIENCY ANEMIA, UNSPECIFIED IRON DEFICIENCY ANEMIA TYPE: ICD-10-CM

## 2025-01-21 DIAGNOSIS — O99.019 ANEMIA AFFECTING PREGNANCY, ANTEPARTUM: ICD-10-CM

## 2025-01-21 LAB
ERYTHROCYTE [DISTWIDTH] IN BLOOD BY AUTOMATED COUNT: 16.8 % (ref 11.8–14.4)
FERRITIN SERPL-MCNC: 14 NG/ML (ref 15–150)
HCT VFR BLD AUTO: 33 % (ref 36.3–47.1)
HGB BLD-MCNC: 10.6 G/DL (ref 11.9–15.1)
IRON SATN MFR SERPL: 54 % (ref 20–55)
IRON SERPL-MCNC: 253 UG/DL (ref 37–145)
MCH RBC QN AUTO: 27.5 PG (ref 25.2–33.5)
MCHC RBC AUTO-ENTMCNC: 32.1 G/DL (ref 28.4–34.8)
MCV RBC AUTO: 85.5 FL (ref 82.6–102.9)
NRBC BLD-RTO: 0 PER 100 WBC
PLATELET # BLD AUTO: 238 K/UL (ref 138–453)
PMV BLD AUTO: 11.9 FL (ref 8.1–13.5)
RBC # BLD AUTO: 3.86 M/UL (ref 3.95–5.11)
TIBC SERPL-MCNC: 468 UG/DL (ref 250–450)
TRANSFERRIN SERPL-MCNC: 375 MG/DL (ref 200–360)
UNSATURATED IRON BINDING CAPACITY: 215 UG/DL (ref 112–347)
WBC OTHER # BLD: 10 K/UL (ref 3.5–11.3)

## 2025-01-21 PROCEDURE — 85027 COMPLETE CBC AUTOMATED: CPT

## 2025-01-21 PROCEDURE — 83550 IRON BINDING TEST: CPT

## 2025-01-21 PROCEDURE — 36415 COLL VENOUS BLD VENIPUNCTURE: CPT

## 2025-01-21 PROCEDURE — 83540 ASSAY OF IRON: CPT

## 2025-01-21 PROCEDURE — 82728 ASSAY OF FERRITIN: CPT

## 2025-01-21 PROCEDURE — 84466 ASSAY OF TRANSFERRIN: CPT

## 2025-01-26 SDOH — ECONOMIC STABILITY: TRANSPORTATION INSECURITY
IN THE PAST 12 MONTHS, HAS LACK OF TRANSPORTATION KEPT YOU FROM MEETINGS, WORK, OR FROM GETTING THINGS NEEDED FOR DAILY LIVING?: NO

## 2025-01-26 SDOH — ECONOMIC STABILITY: TRANSPORTATION INSECURITY
IN THE PAST 12 MONTHS, HAS THE LACK OF TRANSPORTATION KEPT YOU FROM MEDICAL APPOINTMENTS OR FROM GETTING MEDICATIONS?: NO

## 2025-01-26 SDOH — ECONOMIC STABILITY: INCOME INSECURITY: IN THE LAST 12 MONTHS, WAS THERE A TIME WHEN YOU WERE NOT ABLE TO PAY THE MORTGAGE OR RENT ON TIME?: NO

## 2025-01-26 SDOH — ECONOMIC STABILITY: FOOD INSECURITY: WITHIN THE PAST 12 MONTHS, YOU WORRIED THAT YOUR FOOD WOULD RUN OUT BEFORE YOU GOT MONEY TO BUY MORE.: NEVER TRUE

## 2025-01-26 SDOH — ECONOMIC STABILITY: FOOD INSECURITY: WITHIN THE PAST 12 MONTHS, THE FOOD YOU BOUGHT JUST DIDN'T LAST AND YOU DIDN'T HAVE MONEY TO GET MORE.: NEVER TRUE

## 2025-01-29 ENCOUNTER — ROUTINE PRENATAL (OUTPATIENT)
Dept: OBGYN CLINIC | Age: 25
End: 2025-01-29
Payer: COMMERCIAL

## 2025-01-29 VITALS
WEIGHT: 184.8 LBS | SYSTOLIC BLOOD PRESSURE: 120 MMHG | BODY MASS INDEX: 34.01 KG/M2 | HEIGHT: 62 IN | DIASTOLIC BLOOD PRESSURE: 78 MMHG

## 2025-01-29 DIAGNOSIS — Z86.79 S/P CEREBRAL ANEURYSM REPAIR: ICD-10-CM

## 2025-01-29 DIAGNOSIS — Z23 NEED FOR TDAP VACCINATION: ICD-10-CM

## 2025-01-29 DIAGNOSIS — Z3A.30 30 WEEKS GESTATION OF PREGNANCY: ICD-10-CM

## 2025-01-29 DIAGNOSIS — Z98.890 S/P CEREBRAL ANEURYSM REPAIR: ICD-10-CM

## 2025-01-29 DIAGNOSIS — O16.9 HYPERTENSION AFFECTING PREGNANCY, ANTEPARTUM: ICD-10-CM

## 2025-01-29 DIAGNOSIS — O09.93 HIGH-RISK PREGNANCY IN THIRD TRIMESTER: Primary | ICD-10-CM

## 2025-01-29 DIAGNOSIS — O99.019 ANEMIA AFFECTING PREGNANCY, ANTEPARTUM: ICD-10-CM

## 2025-01-29 PROCEDURE — 90472 IMMUNIZATION ADMIN EACH ADD: CPT | Performed by: NURSE PRACTITIONER

## 2025-01-29 PROCEDURE — 90471 IMMUNIZATION ADMIN: CPT | Performed by: NURSE PRACTITIONER

## 2025-01-29 PROCEDURE — 90715 TDAP VACCINE 7 YRS/> IM: CPT | Performed by: NURSE PRACTITIONER

## 2025-01-29 PROCEDURE — 0502F SUBSEQUENT PRENATAL CARE: CPT | Performed by: NURSE PRACTITIONER

## 2025-01-29 ASSESSMENT — PATIENT HEALTH QUESTIONNAIRE - PHQ9: DEPRESSION UNABLE TO ASSESS: PT REFUSES

## 2025-01-29 NOTE — PROGRESS NOTES
Presents for OB visit  Gestation 30w3d  Estimated Date of Delivery: 25    *Transferred obstetric care at 17w5d weeks  *Considering elective primary CS* > wants to schedule primary CS at 40 weeks but if spontaneous labor before then will go for vaginal delivery (will need to schedule closer)  *Cleared by neurology for vaginal delivery*  Insurance: Bcbs    IPV bag/mug given: n/a  Genetic Information given/reviewed: n/a  1st trimester education packet given: n/a  2nd trimester education packet given:  3rd trimester education packet given:      FOB Name: Fabiola    Last Pap Smear: 2024 NILM  [] Urine collected for culture   [] Negative date **   [] Positive date **  [] UDS collected **  [x] Gc/ct collected  > negative Rani Waite DO   [x] Prenatal Labs completed > normal Rani Waite DO     Dating based on   [] LMP  [x] USN  Reviewed by (provider) Rani Waite DO     Genetic Screening:  [x]Accepted NIPS vs FTS   [] Undecided   []Declined   [x]Completed  [x] Low risk   [] Abn for     Carrier Screening:  [] Undecided   [] Accepted   [x] Declined Rani Waite DO 24  [] Known negative CF/SMA/Fragile X  [] Results     Baby aspirin screen:  [x]Positive  []Negative    Early 1 hour GTT: Passed 83  []Yes  [x] No    AFP:   []Ordered  [x]Completed - negative    Anatomy scan:  []Referral Sent  [x]Scheduled 24-TTH MFM  [x]Completed: posterior placenta normal amniotic fluid head circumference 4th percentile rest of growth measurements WNL cervical length measuring 4.27 cm  [x] Follow up has repeat growth with valentino MFM  scheduled in 2024    Fetal Echo:   [] Yes  [x] No    High Risk Factors:  Hx cerebral aneurysm  - two dissecting left communicating ICA aneurysms   -Transarterial flow diverter stent embolization of the left ICA aneurysm   -Has clips in her head. Has neurolgist. Has not been told she must have  section however, patient is afraid to push for vaginal

## 2025-01-29 NOTE — PROGRESS NOTES
The patient requested to have the TDAP vaccine. Consent obtained. Injection of 0.5mL given Left vastus lateralis Intramuscular.  Lot #:   EXP: 03/22/2027    Patient tolerated well.

## 2025-01-29 NOTE — PATIENT INSTRUCTIONS
stop-smoking programs and medicines. These can increase your chances of quitting for good.  When should you call for help?  Call 911 anytime you think you may need emergency care. For example, call if:    You passed out (lost consciousness).     You have severe vaginal bleeding.     You have severe pain in your belly or pelvis.     You have had fluid gushing or leaking from your vagina and you know or think the umbilical cord is bulging into your vagina. If this happens, immediately get down on your knees so your rear end (buttocks) is higher than your head. This will decrease the pressure on the cord until help arrives.    Call your doctor now or seek immediate medical care if:    You have signs of preeclampsia, such as:  Sudden swelling of your face, hands, or feet.  New vision problems (such as dimness or blurring).  A severe headache.     You have any vaginal bleeding.     You have belly pain or cramping.     You have a fever.     You have had regular contractions (with or without pain) for an hour. This means that you have 6 or more within 1 hour after you change your position and drink fluids.     You have a sudden release of fluid from the vagina.     You have low back pain or pelvic pressure that does not go away.     You notice that your baby has stopped moving or is moving much less than normal.    Watch closely for changes in your health, and be sure to contact your doctor if you have any problems.  Where can you learn more?  Go to [email or web address] and sign in to your [employer] account. Enter Q400 in the Search Health Information box to learn more about \" Labor: Care Instructions.\"     If you do not have an account, please click on the \"Sign Up Now\" link.  Current as of: 2018  Content Version: 12.0  © 6059-7666 Healthwise, [employer]. Care instructions adapted under license by [employer]. If you have questions about a medical condition or this instruction,

## 2025-01-30 PROBLEM — O99.011 ANEMIA COMPLICATING PREGNANCY IN FIRST TRIMESTER: Status: ACTIVE | Noted: 2025-01-30

## 2025-02-10 ENCOUNTER — ROUTINE PRENATAL (OUTPATIENT)
Dept: OBGYN CLINIC | Age: 25
End: 2025-02-10
Payer: COMMERCIAL

## 2025-02-10 VITALS
WEIGHT: 188.6 LBS | DIASTOLIC BLOOD PRESSURE: 60 MMHG | HEART RATE: 95 BPM | BODY MASS INDEX: 34.5 KG/M2 | SYSTOLIC BLOOD PRESSURE: 114 MMHG

## 2025-02-10 DIAGNOSIS — O16.9 HYPERTENSION AFFECTING PREGNANCY, ANTEPARTUM: ICD-10-CM

## 2025-02-10 DIAGNOSIS — O09.93 HIGH-RISK PREGNANCY IN THIRD TRIMESTER: Primary | ICD-10-CM

## 2025-02-10 PROCEDURE — 59025 FETAL NON-STRESS TEST: CPT | Performed by: OBSTETRICS & GYNECOLOGY

## 2025-02-10 PROCEDURE — 0502F SUBSEQUENT PRENATAL CARE: CPT | Performed by: OBSTETRICS & GYNECOLOGY

## 2025-02-10 NOTE — PROGRESS NOTES
Gestational age 32W1D  Indications HTN  NST started 3:58  /60  Pulse 95  Weight 188.6lb  Patient recording movements. Patient stated fetal movement active, starts iron infusion this week

## 2025-02-11 NOTE — PROGRESS NOTES
NST (Non Stress Test) Worksheet  25    Diagnosis:  Ina Gaspar is a 24 y.o. female    32w2d  1. High-risk pregnancy in third trimester    2. Hypertension affecting pregnancy, antepartum          Indication for Testing:  chronic hypertension      Scheduled Procedure:  Non Stress Test (59025-CPT)      Findings:  Total time of tracing to complete testing: >20 Minutes (Minimum must be 20 minutes)  NST is  reactive  CATEGORY 1 TRACING  Variability is moderate  Baseline FHR of 140 bpm    Accelerations are identified 15 beats above the baseline for 15 minutes: Yes (>32 weeks gestation)    Accelerations are identified 10 beats above the baseline for 10 minutes: NA (28-32 weeks gestation)    *Transferred obstetric care at 17w5d weeks  *Considering elective primary CS* > wants to schedule primary CS at 40 weeks but if spontaneous labor before then will go for vaginal delivery (will need to schedule closer)  *Cleared by neurology for vaginal delivery*  Insurance: Bcbs    IPV bag/mug given: n/a  Genetic Information given/reviewed: n/a  1st trimester education packet given: n/a  2nd trimester education packet given:  3rd trimester education packet given:      FOB Name: Fabiola    Last Pap Smear: 2024 NILM  [] Urine collected for culture   [] Negative date **   [] Positive date **  [] UDS collected **  [x] Gc/ct collected  > negative Rani Waite DO   [x] Prenatal Labs completed > normal Rani Waite DO     Dating based on   [] LMP  [x] USN  Reviewed by (provider) Rani Waite DO     Genetic Screening:  [x]Accepted NIPS vs FTS   [] Undecided   []Declined   [x]Completed  [x] Low risk   [] Abn for     Carrier Screening:  [] Undecided   [] Accepted   [x] Declined Rani Waite DO 24  [] Known negative CF/SMA/Fragile X  [] Results     Baby aspirin screen:  [x]Positive  []Negative    Early 1 hour GTT: Passed 83  []Yes  [x] No    AFP:   []Ordered  [x]Completed -

## 2025-02-12 ENCOUNTER — TELEPHONE (OUTPATIENT)
Dept: OBGYN CLINIC | Age: 25
End: 2025-02-12

## 2025-02-12 DIAGNOSIS — O09.93 HIGH-RISK PREGNANCY IN THIRD TRIMESTER: ICD-10-CM

## 2025-02-12 DIAGNOSIS — O16.9 HYPERTENSION AFFECTING PREGNANCY, ANTEPARTUM: ICD-10-CM

## 2025-02-12 NOTE — TELEPHONE ENCOUNTER
Lvm for pt    Patient called wanting to schedule csection,     Called patient back to let pt know can go and send a [employer] message with the dates dr leal/tamara had sent to patient

## 2025-02-18 ENCOUNTER — ROUTINE PRENATAL (OUTPATIENT)
Dept: OBGYN CLINIC | Age: 25
End: 2025-02-18

## 2025-02-18 VITALS
DIASTOLIC BLOOD PRESSURE: 68 MMHG | WEIGHT: 192.2 LBS | HEART RATE: 104 BPM | SYSTOLIC BLOOD PRESSURE: 116 MMHG | BODY MASS INDEX: 35.15 KG/M2

## 2025-02-18 DIAGNOSIS — O16.9 HYPERTENSION AFFECTING PREGNANCY, ANTEPARTUM: ICD-10-CM

## 2025-02-18 DIAGNOSIS — O09.93 HIGH-RISK PREGNANCY IN THIRD TRIMESTER: Primary | ICD-10-CM

## 2025-02-18 DIAGNOSIS — Z3A.33 33 WEEKS GESTATION OF PREGNANCY: ICD-10-CM

## 2025-02-18 PROCEDURE — 0502F SUBSEQUENT PRENATAL CARE: CPT | Performed by: ADVANCED PRACTICE MIDWIFE

## 2025-02-18 NOTE — PROGRESS NOTES
SUBJECTIVE:    Ina is here for her return OB visit. denies concerns today.   She reports  feeling fetal movement.  She denies vaginal bleeding.  She denies vaginal discharge.  She denies leaking of fluid.  She denies uterine cramping.  She denies  nausea and/or vomiting.    OBJECTIVE:  Blood pressure 116/68, pulse (!) 104, weight 87.2 kg (192 lb 3.2 oz), last menstrual period 06/30/2024, not currently breastfeeding.    Total weight gain: Not found.    NST:   Baseline:  150  Variability:  Moderate  Accelerations:  Present  Decelerations: Absent   Fetal Movement:  Perceived by patient during NST  Contractions: patient denies contractions, contractions Absent  on toco.  Interpretation: Reactive (Category1)     BPP completed during appointment: Yes and 8/8        ASSESSMENT/PLAN:  IUP @ 33+4 weeks  S=D    High Risk Pregnancy  Due date is based on LMP and confirmed with  early dating ultrasound  Patient's prenatal labs are completed.  Patient's blood type O positive and rhogam is not indicated in the pregnancy.   Early glucola indicated: yes  Completed: Yes  Results: 83 Pass  Patient Accepted  genetic screening.   Accepted and cell free DNA testingand test(s) are low risk trisomy 21/18/16 (NIPT)  Anatomy scan scheduled 11/21/24 posterior placenta normal amniotic fluid head circumference 4th percentile rest of growth measurements WNL cervical length measuring 4.27 cm  Second trimester 24-28 week labs:   [x] Ordered  [x] Completed 12/30/24  [x] Glucose screening 121  [x] Recommendations pass  Total weight gain in pregnancy reviewed: Yes  Fetal Kick Count was discussed and explained. She was instructed to lay on her left side 20 minutes after eating and count movements for up to 2 hours with a target value of 10 movements. Instructed to notify office if she does not make the target.  Reviewed GBS recommendations in pregnancy:  [x] Educated, will complete at 36 weeks and Ina is agreeable  [] Collected at visit  []

## 2025-02-18 NOTE — PROGRESS NOTES
Gestational age 33W2D  Indications HTN  NST started 1:46  /68  Pulse 104  Weight 192.2LB  Patient recording movements. Patient stated fetal movement ACTIVE, NO ISSUES

## 2025-02-19 DIAGNOSIS — O09.93 HIGH-RISK PREGNANCY IN THIRD TRIMESTER: ICD-10-CM

## 2025-02-19 DIAGNOSIS — O16.9 HYPERTENSION AFFECTING PREGNANCY, ANTEPARTUM: ICD-10-CM

## 2025-02-24 ENCOUNTER — ROUTINE PRENATAL (OUTPATIENT)
Dept: OBGYN CLINIC | Age: 25
End: 2025-02-24
Payer: COMMERCIAL

## 2025-02-24 ENCOUNTER — HOSPITAL ENCOUNTER (OUTPATIENT)
Age: 25
Discharge: HOME | End: 2025-02-24
Payer: COMMERCIAL

## 2025-02-24 VITALS
WEIGHT: 194.4 LBS | BODY MASS INDEX: 35.56 KG/M2 | SYSTOLIC BLOOD PRESSURE: 124 MMHG | DIASTOLIC BLOOD PRESSURE: 64 MMHG | HEART RATE: 97 BPM

## 2025-02-24 DIAGNOSIS — O16.9 HYPERTENSION AFFECTING PREGNANCY, ANTEPARTUM: Primary | ICD-10-CM

## 2025-02-24 DIAGNOSIS — O99.019 ANTEPARTUM ANEMIA: ICD-10-CM

## 2025-02-24 DIAGNOSIS — O09.93 HIGH-RISK PREGNANCY IN THIRD TRIMESTER: ICD-10-CM

## 2025-02-24 LAB
ERYTHROCYTE [DISTWIDTH] IN BLOOD BY AUTOMATED COUNT: 16.8 % (ref 11.8–14.4)
FERRITIN SERPL-MCNC: 19 NG/ML (ref 15–150)
HCT VFR BLD AUTO: 33.9 % (ref 36.3–47.1)
HGB BLD-MCNC: 10.8 G/DL (ref 11.9–15.1)
IRON SATN MFR SERPL: 33 % (ref 20–55)
IRON SERPL-MCNC: 139 UG/DL (ref 37–145)
MCH RBC QN AUTO: 27.9 PG (ref 25.2–33.5)
MCHC RBC AUTO-ENTMCNC: 31.9 G/DL (ref 28.4–34.8)
MCV RBC AUTO: 87.6 FL (ref 82.6–102.9)
NRBC BLD-RTO: 0 PER 100 WBC
PLATELET # BLD AUTO: 199 K/UL (ref 138–453)
PMV BLD AUTO: 11.2 FL (ref 8.1–13.5)
RBC # BLD AUTO: 3.87 M/UL (ref 3.95–5.11)
TIBC SERPL-MCNC: 423 UG/DL (ref 250–450)
UNSATURATED IRON BINDING CAPACITY: 284 UG/DL (ref 112–347)
WBC OTHER # BLD: 10.4 K/UL (ref 3.5–11.3)

## 2025-02-24 PROCEDURE — 85027 COMPLETE CBC AUTOMATED: CPT

## 2025-02-24 PROCEDURE — 83540 ASSAY OF IRON: CPT

## 2025-02-24 PROCEDURE — 36415 COLL VENOUS BLD VENIPUNCTURE: CPT

## 2025-02-24 PROCEDURE — 59025 FETAL NON-STRESS TEST: CPT | Performed by: OBSTETRICS & GYNECOLOGY

## 2025-02-24 PROCEDURE — 0502F SUBSEQUENT PRENATAL CARE: CPT | Performed by: OBSTETRICS & GYNECOLOGY

## 2025-02-24 PROCEDURE — 83550 IRON BINDING TEST: CPT

## 2025-02-24 PROCEDURE — 82728 ASSAY OF FERRITIN: CPT

## 2025-02-25 ENCOUNTER — TELEPHONE (OUTPATIENT)
Dept: OBGYN CLINIC | Age: 25
End: 2025-02-25

## 2025-02-25 NOTE — PROGRESS NOTES
Gestational age 34W1D  Indications HTN, ANEMIA  NST started 3:37  /64  Pulse 97  Weight 194.4LB  Patient recording movements. Patient stated fetal movement ACTIVE, IV INFUSION $700 A VISIT OUT OF POCKET  
  []Ordered  [x]Completed - negative    Anatomy scan:  []Referral Sent  [x]Scheduled 24-TTH MFM  [x]Completed: posterior placenta normal amniotic fluid head circumference 4th percentile rest of growth measurements WNL cervical length measuring 4.27 cm  [x] Follow up has repeat growth with [facility] M[facility]  scheduled in 2024    Fetal Echo:   [] Yes  [x] No    High Risk Factors:  Hx cerebral aneurysm  - two dissecting left communicating ICA aneurysms   -Transarterial flow diverter stent embolization of the left ICA aneurysm   -Has clips in her head. Has neurolgist. Has not been told she must have  section however, patient is afraid to push for vaginal delivery  -Has been having worsening headaches. Recommended back to neuro for scan  - Pt understands vaginal delivery is indicated and cleared, however, due to anxiety she is leaning toward PRIMARY ELECTIVE .  Counseled in detail on risks and benefits 2/10/2025  cHTN  -Hx HTN on meds x 2-3 yrs. No meds at this time. Keeping cHTN dx  Hx insulin resistance  -unclear etiology. Was on metformin until pregnancy and taken off. No early screening. [facility] weeks at [facility]. Recommended 2 hr GTT and ordered  Asthma  -controlled with medications  Depression  - was taken off all meds at the start of pregnancy. Worsening symptoms. Recommend restarting buspar. Patient amenable 24  - still hasn't started it as of 2024 re discussed and will start      [x]Placed on High Risk List    Fetal Surveillance:  [x] Yes cHTN  [] No    Covid Vaccine:  Flu Vaccine:  []Given   [x] Declined 24 Rani Waite, DO   Tdap:  []Given **  [] Declined **  Rhogam: O+  []Given   [x] Not indicated     1hr GTT:  [x] Results passed  3hr GTT:    []Breast Pump Order Signed/Sent    36 weeks US:  []Completed     GBS:  [] Positive   [] Negative from **    Apts with :  [x] Date: 2/10/25 Gestational Age: 23w1d  [] Date: ** Gestational Age: **    Apts with

## 2025-02-28 NOTE — PROGRESS NOTES
Pt is here today at her 35 weeks with NST   Pt states fetal movement is ***  Pt has ***  NST start time ?

## 2025-03-03 ENCOUNTER — TELEPHONE (OUTPATIENT)
Dept: OBGYN CLINIC | Age: 25
End: 2025-03-03

## 2025-03-03 ENCOUNTER — HOSPITAL ENCOUNTER (OUTPATIENT)
Age: 25
Setting detail: SPECIMEN
Discharge: HOME | End: 2025-03-03

## 2025-03-03 ENCOUNTER — ROUTINE PRENATAL (OUTPATIENT)
Dept: OBGYN CLINIC | Age: 25
End: 2025-03-03

## 2025-03-03 VITALS
WEIGHT: 195 LBS | DIASTOLIC BLOOD PRESSURE: 68 MMHG | BODY MASS INDEX: 35.88 KG/M2 | SYSTOLIC BLOOD PRESSURE: 116 MMHG | HEIGHT: 62 IN

## 2025-03-03 DIAGNOSIS — O16.9 HYPERTENSION AFFECTING PREGNANCY, ANTEPARTUM: ICD-10-CM

## 2025-03-03 DIAGNOSIS — R51.9 PREGNANCY HEADACHE IN THIRD TRIMESTER: ICD-10-CM

## 2025-03-03 DIAGNOSIS — O26.893 PREGNANCY HEADACHE IN THIRD TRIMESTER: ICD-10-CM

## 2025-03-03 DIAGNOSIS — Z3A.35 35 WEEKS GESTATION OF PREGNANCY: ICD-10-CM

## 2025-03-03 DIAGNOSIS — O09.93 HIGH-RISK PREGNANCY IN THIRD TRIMESTER: Primary | ICD-10-CM

## 2025-03-03 LAB
ALBUMIN SERPL-MCNC: 3.5 G/DL (ref 3.5–5.2)
ALBUMIN/GLOB SERPL: 1.2 {RATIO} (ref 1–2.5)
ALP SERPL-CCNC: 87 U/L (ref 35–104)
ALT SERPL-CCNC: 6 U/L (ref 10–35)
ANION GAP SERPL CALCULATED.3IONS-SCNC: 12 MMOL/L (ref 9–16)
AST SERPL-CCNC: 17 U/L (ref 10–35)
BILIRUB SERPL-MCNC: 0.2 MG/DL (ref 0–1.2)
BUN SERPL-MCNC: 5 MG/DL (ref 6–20)
CALCIUM SERPL-MCNC: 8.7 MG/DL (ref 8.6–10.4)
CHLORIDE SERPL-SCNC: 103 MMOL/L (ref 98–107)
CO2 SERPL-SCNC: 21 MMOL/L (ref 20–31)
CREAT SERPL-MCNC: 0.5 MG/DL (ref 0.6–0.9)
ERYTHROCYTE [DISTWIDTH] IN BLOOD BY AUTOMATED COUNT: 16.9 % (ref 11.8–14.4)
GFR, ESTIMATED: >90 ML/MIN/1.73M2
GLUCOSE SERPL-MCNC: 82 MG/DL (ref 74–99)
HCT VFR BLD AUTO: 36.5 % (ref 36.3–47.1)
HGB BLD-MCNC: 11.3 G/DL (ref 11.9–15.1)
MCH RBC QN AUTO: 27.9 PG (ref 25.2–33.5)
MCHC RBC AUTO-ENTMCNC: 31 G/DL (ref 28.4–34.8)
MCV RBC AUTO: 90.1 FL (ref 82.6–102.9)
NRBC BLD-RTO: 0 PER 100 WBC
PLATELET # BLD AUTO: 188 K/UL (ref 138–453)
PMV BLD AUTO: 11.8 FL (ref 8.1–13.5)
POTASSIUM SERPL-SCNC: 3.9 MMOL/L (ref 3.7–5.3)
PROT SERPL-MCNC: 6.4 G/DL (ref 6.6–8.7)
RBC # BLD AUTO: 4.05 M/UL (ref 3.95–5.11)
SODIUM SERPL-SCNC: 136 MMOL/L (ref 136–145)
WBC OTHER # BLD: 10 K/UL (ref 3.5–11.3)

## 2025-03-03 PROCEDURE — 0502F SUBSEQUENT PRENATAL CARE: CPT | Performed by: ADVANCED PRACTICE MIDWIFE

## 2025-03-03 RX ORDER — METOCLOPRAMIDE 10 MG/1
10 TABLET ORAL 3 TIMES DAILY PRN
Qty: 30 TABLET | Refills: 0 | Status: SHIPPED | OUTPATIENT
Start: 2025-03-03

## 2025-03-03 RX ORDER — ACETAMINOPHEN 500 MG
1000 TABLET ORAL EVERY 6 HOURS PRN
Qty: 30 TABLET | Refills: 0 | Status: SHIPPED | OUTPATIENT
Start: 2025-03-03

## 2025-03-03 RX ORDER — DIPHENHYDRAMINE HCL 25 MG
50 CAPSULE ORAL EVERY 6 HOURS PRN
Qty: 30 CAPSULE | Refills: 0 | Status: SHIPPED | OUTPATIENT
Start: 2025-03-03 | End: 2025-03-13

## 2025-03-03 NOTE — TELEPHONE ENCOUNTER
Pt left her appointment and got lunch- she did have burger for lunch- now she is experiencing period like cramps- pt is not timing them- not sure if it something to be concerned about or maybe from the burger    Stated it could be from the burger and the grease- would have her rest and hydrate- if she is have to time the cramping to do so-     Pt denies any constipation having normal bowel movements- not decrease fetal movements or bleeding or leaking of the fluid    Pt aware to rest, hydrate- if sx worsen or she starts with contractions that are 5-7 min apart and persistent and not getting better with rest or hydration or decrease fetal movements, bleeding or leaking of fluid to notify the office or go in to [facility] for evaluation.

## 2025-03-03 NOTE — PROGRESS NOTES
SUBJECTIVE:    Ina is here for her return OB visit. admits to concerns today. Headaches on going checks her blood pressure at home and they are normal. History of headaches/migraines outside of pregnancy. Has seen neuro and recommended botox uncomfortable with that plan  She reports  feeling fetal movement.  She denies vaginal bleeding.  She denies vaginal discharge.  She denies leaking of fluid.  She denies uterine cramping.  She denies  nausea and/or vomiting.    OBJECTIVE:  Blood pressure 116/68, height 1.575 m (5' 2\"), weight 88.5 kg (195 lb), last menstrual period 06/30/2024, not currently breastfeeding.    Total weight gain: Not found.    NST:   Baseline:  140  Variability:  Moderate  Accelerations:  Present  Decelerations: Absent   Fetal Movement:  Perceived by patient during NST  Contractions: patient denies contractions, contractions Absent  on toco.  Interpretation: Reactive (Category1)     BPP completed during appointment: Yes and 8/8        Vaccinations:   [x]Accepted tdap  []Declined   [] Undecided   [] Educated on recommendations    ASSESSMENT/PLAN:  IUP @ 35+1 weeks  S=D    High Risk Pregnancy  Due date is based on LMP and confirmed with  early dating ultrasound  Patient's prenatal labs are completed.  Patient's blood type O positive and rhogam is not indicated in the pregnancy.   Early glucola indicated: yes  Completed: Yes  Results: 83 Pass  Patient Accepted  genetic screening.   Accepted and cell free DNA testingand test(s) are low risk trisomy 21/18/16 (NIPT)  Anatomy scan scheduled 11/21/24 posterior placenta normal amniotic fluid head circumference 4th percentile rest of growth measurements WNL cervical length measuring 4.27 cm  Second trimester 24-28 week labs:   [x] Ordered  [x] Completed 12/30/24  [x] Glucose screening 121  [x] Recommendations pass  Total weight gain in pregnancy reviewed: Yes  Fetal Kick Count was discussed and explained. She was instructed to lay on her left side 20

## 2025-03-04 DIAGNOSIS — R51.9 PREGNANCY HEADACHE IN THIRD TRIMESTER: ICD-10-CM

## 2025-03-04 DIAGNOSIS — O26.893 PREGNANCY HEADACHE IN THIRD TRIMESTER: ICD-10-CM

## 2025-03-06 DIAGNOSIS — O09.93 HIGH-RISK PREGNANCY IN THIRD TRIMESTER: ICD-10-CM

## 2025-03-06 DIAGNOSIS — O16.9 HYPERTENSION AFFECTING PREGNANCY, ANTEPARTUM: ICD-10-CM

## 2025-03-09 ENCOUNTER — RESULTS FOLLOW-UP (OUTPATIENT)
Dept: OBGYN CLINIC | Age: 25
End: 2025-03-09

## 2025-03-10 ENCOUNTER — HOSPITAL ENCOUNTER (OUTPATIENT)
Age: 25
Setting detail: SPECIMEN
Discharge: HOME | End: 2025-03-10

## 2025-03-10 ENCOUNTER — ROUTINE PRENATAL (OUTPATIENT)
Dept: OBGYN CLINIC | Age: 25
End: 2025-03-10
Payer: COMMERCIAL

## 2025-03-10 VITALS
WEIGHT: 200.4 LBS | SYSTOLIC BLOOD PRESSURE: 108 MMHG | DIASTOLIC BLOOD PRESSURE: 68 MMHG | BODY MASS INDEX: 36.65 KG/M2 | HEART RATE: 115 BPM

## 2025-03-10 DIAGNOSIS — R00.0 TACHYCARDIA: ICD-10-CM

## 2025-03-10 DIAGNOSIS — Z3A.36 36 WEEKS GESTATION OF PREGNANCY: ICD-10-CM

## 2025-03-10 DIAGNOSIS — O09.93 HIGH-RISK PREGNANCY IN THIRD TRIMESTER: Primary | ICD-10-CM

## 2025-03-10 DIAGNOSIS — O09.93 HIGH-RISK PREGNANCY IN THIRD TRIMESTER: ICD-10-CM

## 2025-03-10 DIAGNOSIS — O16.9 HYPERTENSION AFFECTING PREGNANCY, ANTEPARTUM: ICD-10-CM

## 2025-03-10 LAB
CREAT UR-MCNC: 111 MG/DL (ref 28–217)
TOTAL PROTEIN, URINE: 22 MG/DL
TSH SERPL DL<=0.05 MIU/L-ACNC: 2.05 UIU/ML (ref 0.27–4.2)
URINE TOTAL PROTEIN CREATININE RATIO: 0.2 (ref 0–0.2)

## 2025-03-10 PROCEDURE — 0502F SUBSEQUENT PRENATAL CARE: CPT | Performed by: STUDENT IN AN ORGANIZED HEALTH CARE EDUCATION/TRAINING PROGRAM

## 2025-03-10 PROCEDURE — 59025 FETAL NON-STRESS TEST: CPT | Performed by: STUDENT IN AN ORGANIZED HEALTH CARE EDUCATION/TRAINING PROGRAM

## 2025-03-10 NOTE — PROGRESS NOTES
Gestational age 36w1d  Indications htn  NST started 1:45  /68  Pulse 120, 115  Weight 200.4lb  Patient recording movements. Patient stated fetal movement active no issues

## 2025-03-10 NOTE — PROGRESS NOTES
Prenatal Visit    Ina Gaspar is a 24 y.o. female  at 36w1d    Positive fetal movement  Negative contractions, vaginal bleeding, loss of fluid    Discussed resident involvement in triage and labor and delivery.  Discussed call group.  Patient verbalized understanding and agreement.      Vitals:  /68   Pulse (!) 115   Wt 90.9 kg (200 lb 6.4 oz)   LMP 2024 (Approximate)   BMI 36.65 kg/m²     NST:  Baseline: 155  Variability: moderate  Accelerations: present  Decelerations: absent  Reactive: yes   Time: 30 min    BPP     Assessment & Plan:  Ina Gaspar is a 24 y.o. female  at 36w1d   - 28 week labs completed   - Influenza and Covid vaccinations recommended per ACOG: R/B/A discussed with increased risk of both maternal and fetal morbidity and mortality in unvaccinated pregnant patients.    - TDAP and RSV vaccinations recommended per ACOG. R/B/A discussed. She understands must received RSV vaccine at pharmacy.   - Continue prenatal vitamin   - GBS collected today   - Intermittent tachycardia. Asymptomatic. TSH ordered. CBC and CMP last week unremarkable. Does have some ongoing lightheadedness and dizziness which has been throughout the pregnancy. Does have significant neuro history of aneurysms. Per neuro will image postpartum unless repeat needed sooner. Will continue to monitor and if ongoing symptoms will scan head. Patient reports she only had headaches with the aneurysms and does not feel it to be related. Reports has been hydrating adequately. BP normotensive.     3/24/25 PLTCS 8 AM DR ALMODOVAR     *Transferred obstetric care at 17w5d weeks  *Considering elective primary CS* > wants to schedule primary CS at 40 weeks but if spontaneous labor before then will go for vaginal delivery (will need to schedule closer)  *Cleared by neurology for vaginal delivery*  Insurance: [employer]      FOB Name: Fabiola    Last Pap Smear: 2024 NILM  [x] Urine collected for

## 2025-03-11 ENCOUNTER — RESULTS FOLLOW-UP (OUTPATIENT)
Dept: LAB | Age: 25
End: 2025-03-11

## 2025-03-11 ENCOUNTER — RESULTS FOLLOW-UP (OUTPATIENT)
Dept: OBGYN CLINIC | Age: 25
End: 2025-03-11

## 2025-03-12 DIAGNOSIS — O09.93 HIGH-RISK PREGNANCY IN THIRD TRIMESTER: ICD-10-CM

## 2025-03-12 DIAGNOSIS — O16.9 HYPERTENSION AFFECTING PREGNANCY, ANTEPARTUM: ICD-10-CM

## 2025-03-13 LAB
MICROORGANISM SPEC CULT: NORMAL
SERVICE CMNT-IMP: NORMAL
SPECIMEN DESCRIPTION: NORMAL

## 2025-03-17 ENCOUNTER — ANESTHESIA EVENT (OUTPATIENT)
Dept: LABOR AND DELIVERY | Age: 25
End: 2025-03-17
Payer: COMMERCIAL

## 2025-03-17 ENCOUNTER — HOSPITAL ENCOUNTER (INPATIENT)
Age: 25
LOS: 3 days | Discharge: HOME | End: 2025-03-20
Attending: OBSTETRICS & GYNECOLOGY | Admitting: OBSTETRICS & GYNECOLOGY
Payer: COMMERCIAL

## 2025-03-17 ENCOUNTER — ANESTHESIA (OUTPATIENT)
Dept: LABOR AND DELIVERY | Age: 25
End: 2025-03-17
Payer: COMMERCIAL

## 2025-03-17 DIAGNOSIS — Z98.891 S/P PRIMARY LOW TRANSVERSE C-SECTION: Primary | ICD-10-CM

## 2025-03-17 PROBLEM — Z3A.37 37 WEEKS GESTATION OF PREGNANCY: Status: ACTIVE | Noted: 2025-03-17

## 2025-03-17 PROBLEM — I10 HYPERTENSION: Status: ACTIVE | Noted: 2025-03-17

## 2025-03-17 LAB
ABO + RH BLD: NORMAL
ALBUMIN SERPL-MCNC: 3.3 G/DL (ref 3.5–5.2)
ALBUMIN/GLOB SERPL: 1.1 {RATIO} (ref 1–2.5)
ALP SERPL-CCNC: 105 U/L (ref 35–104)
ALT SERPL-CCNC: 8 U/L (ref 10–35)
AMPHET UR QL SCN: NEGATIVE
ANION GAP SERPL CALCULATED.3IONS-SCNC: 11 MMOL/L (ref 9–16)
ARM BAND NUMBER: NORMAL
AST SERPL-CCNC: 20 U/L (ref 10–35)
BARBITURATES UR QL SCN: NEGATIVE
BENZODIAZ UR QL: NEGATIVE
BILIRUB SERPL-MCNC: 0.2 MG/DL (ref 0–1.2)
BLOOD BANK SAMPLE EXPIRATION: NORMAL
BLOOD GROUP ANTIBODIES SERPL: NEGATIVE
BUN SERPL-MCNC: 5 MG/DL (ref 6–20)
CALCIUM SERPL-MCNC: 7.8 MG/DL (ref 8.6–10.4)
CANNABINOIDS UR QL SCN: NEGATIVE
CHLORIDE SERPL-SCNC: 105 MMOL/L (ref 98–107)
CO2 SERPL-SCNC: 20 MMOL/L (ref 20–31)
COCAINE UR QL SCN: NEGATIVE
CREAT SERPL-MCNC: 0.5 MG/DL (ref 0.6–0.9)
CREAT UR-MCNC: 34.9 MG/DL (ref 28–217)
ERYTHROCYTE [DISTWIDTH] IN BLOOD BY AUTOMATED COUNT: 16.1 % (ref 11.8–14.4)
FENTANYL UR QL: NEGATIVE
GFR, ESTIMATED: >90 ML/MIN/1.73M2
GLUCOSE SERPL-MCNC: 84 MG/DL (ref 74–99)
HCT VFR BLD AUTO: 35.3 % (ref 36.3–47.1)
HGB BLD-MCNC: 11.2 G/DL (ref 11.9–15.1)
MCH RBC QN AUTO: 27.7 PG (ref 25.2–33.5)
MCHC RBC AUTO-ENTMCNC: 31.7 G/DL (ref 28.4–34.8)
MCV RBC AUTO: 87.2 FL (ref 82.6–102.9)
METHADONE UR QL: NEGATIVE
NRBC BLD-RTO: 0 PER 100 WBC
OPIATES UR QL SCN: NEGATIVE
OXYCODONE UR QL SCN: NEGATIVE
PCP UR QL SCN: NEGATIVE
PLATELET # BLD AUTO: 187 K/UL (ref 138–453)
PMV BLD AUTO: 11.3 FL (ref 8.1–13.5)
POTASSIUM SERPL-SCNC: 3.7 MMOL/L (ref 3.7–5.3)
PROT SERPL-MCNC: 6.2 G/DL (ref 6.6–8.7)
RBC # BLD AUTO: 4.05 M/UL (ref 3.95–5.11)
SODIUM SERPL-SCNC: 136 MMOL/L (ref 136–145)
T PALLIDUM AB SER QL IA: NONREACTIVE
TEST INFORMATION: NORMAL
TOTAL PROTEIN, URINE: <4 MG/DL
URINE TOTAL PROTEIN CREATININE RATIO: NORMAL (ref 0–0.2)
WBC OTHER # BLD: 9.8 K/UL (ref 3.5–11.3)

## 2025-03-17 PROCEDURE — 86780 TREPONEMA PALLIDUM: CPT

## 2025-03-17 PROCEDURE — 86901 BLOOD TYPING SEROLOGIC RH(D): CPT

## 2025-03-17 PROCEDURE — 3700000001 HC ADD 15 MINUTES (ANESTHESIA): Performed by: OBSTETRICS & GYNECOLOGY

## 2025-03-17 PROCEDURE — 59510 CESAREAN DELIVERY: CPT | Performed by: OBSTETRICS & GYNECOLOGY

## 2025-03-17 PROCEDURE — 6360000002 HC RX W HCPCS

## 2025-03-17 PROCEDURE — 7100000000 HC PACU RECOVERY - FIRST 15 MIN: Performed by: OBSTETRICS & GYNECOLOGY

## 2025-03-17 PROCEDURE — 85027 COMPLETE CBC AUTOMATED: CPT

## 2025-03-17 PROCEDURE — 2580000003 HC RX 258

## 2025-03-17 PROCEDURE — 6370000000 HC RX 637 (ALT 250 FOR IP)

## 2025-03-17 PROCEDURE — 84156 ASSAY OF PROTEIN URINE: CPT

## 2025-03-17 PROCEDURE — 7100000001 HC PACU RECOVERY - ADDTL 15 MIN: Performed by: OBSTETRICS & GYNECOLOGY

## 2025-03-17 PROCEDURE — 86900 BLOOD TYPING SEROLOGIC ABO: CPT

## 2025-03-17 PROCEDURE — 86850 RBC ANTIBODY SCREEN: CPT

## 2025-03-17 PROCEDURE — 2500000003 HC RX 250 WO HCPCS

## 2025-03-17 PROCEDURE — 80307 DRUG TEST PRSMV CHEM ANLYZR: CPT

## 2025-03-17 PROCEDURE — 80053 COMPREHEN METABOLIC PANEL: CPT

## 2025-03-17 PROCEDURE — 1220000000 HC SEMI PRIVATE OB R&B

## 2025-03-17 PROCEDURE — 82570 ASSAY OF URINE CREATININE: CPT

## 2025-03-17 PROCEDURE — 3700000000 HC ANESTHESIA ATTENDED CARE: Performed by: OBSTETRICS & GYNECOLOGY

## 2025-03-17 PROCEDURE — 3609079900 HC CESAREAN SECTION: Performed by: OBSTETRICS & GYNECOLOGY

## 2025-03-17 PROCEDURE — 88307 TISSUE EXAM BY PATHOLOGIST: CPT

## 2025-03-17 RX ORDER — CITRIC ACID/SODIUM CITRATE 334-500MG
30 SOLUTION, ORAL ORAL ONCE
Status: COMPLETED | OUTPATIENT
Start: 2025-03-17 | End: 2025-03-17

## 2025-03-17 RX ORDER — TRANEXAMIC ACID 10 MG/ML
1000 INJECTION, SOLUTION INTRAVENOUS ONCE
Status: COMPLETED | OUTPATIENT
Start: 2025-03-17 | End: 2025-03-17

## 2025-03-17 RX ORDER — SIMETHICONE 80 MG
80 TABLET,CHEWABLE ORAL EVERY 6 HOURS PRN
Status: DISCONTINUED | OUTPATIENT
Start: 2025-03-17 | End: 2025-03-20 | Stop reason: HOSPADM

## 2025-03-17 RX ORDER — ACETAMINOPHEN 500 MG
1000 TABLET ORAL EVERY 6 HOURS PRN
Qty: 120 TABLET | Refills: 1 | Status: SHIPPED | OUTPATIENT
Start: 2025-03-17

## 2025-03-17 RX ORDER — SODIUM CHLORIDE 0.9 % (FLUSH) 0.9 %
5-40 SYRINGE (ML) INJECTION EVERY 12 HOURS SCHEDULED
Status: DISCONTINUED | OUTPATIENT
Start: 2025-03-17 | End: 2025-03-17

## 2025-03-17 RX ORDER — DIPHENHYDRAMINE HYDROCHLORIDE 50 MG/ML
25 INJECTION, SOLUTION INTRAMUSCULAR; INTRAVENOUS EVERY 6 HOURS PRN
Status: DISCONTINUED | OUTPATIENT
Start: 2025-03-17 | End: 2025-03-20 | Stop reason: HOSPADM

## 2025-03-17 RX ORDER — ONDANSETRON 4 MG/1
4 TABLET, ORALLY DISINTEGRATING ORAL EVERY 8 HOURS PRN
Status: DISCONTINUED | OUTPATIENT
Start: 2025-03-17 | End: 2025-03-20 | Stop reason: HOSPADM

## 2025-03-17 RX ORDER — ALBUTEROL SULFATE 90 UG/1
2 INHALANT RESPIRATORY (INHALATION) EVERY 6 HOURS PRN
Status: DISCONTINUED | OUTPATIENT
Start: 2025-03-17 | End: 2025-03-20 | Stop reason: HOSPADM

## 2025-03-17 RX ORDER — BISACODYL 10 MG
10 SUPPOSITORY, RECTAL RECTAL DAILY PRN
Status: DISCONTINUED | OUTPATIENT
Start: 2025-03-17 | End: 2025-03-20 | Stop reason: HOSPADM

## 2025-03-17 RX ORDER — SODIUM CHLORIDE 0.9 % (FLUSH) 0.9 %
5-40 SYRINGE (ML) INJECTION PRN
Status: DISCONTINUED | OUTPATIENT
Start: 2025-03-17 | End: 2025-03-20 | Stop reason: HOSPADM

## 2025-03-17 RX ORDER — POLYETHYLENE GLYCOL 3350 17 G/17G
17 POWDER, FOR SOLUTION ORAL DAILY PRN
Status: DISCONTINUED | OUTPATIENT
Start: 2025-03-17 | End: 2025-03-20 | Stop reason: HOSPADM

## 2025-03-17 RX ORDER — SENNOSIDES A AND B 8.6 MG/1
1 TABLET, FILM COATED ORAL 2 TIMES DAILY
Qty: 60 TABLET | Refills: 1 | Status: SHIPPED | OUTPATIENT
Start: 2025-03-17 | End: 2026-03-17

## 2025-03-17 RX ORDER — KETOROLAC TROMETHAMINE 30 MG/ML
30 INJECTION, SOLUTION INTRAMUSCULAR; INTRAVENOUS EVERY 6 HOURS
Status: COMPLETED | OUTPATIENT
Start: 2025-03-17 | End: 2025-03-18

## 2025-03-17 RX ORDER — OXYCODONE HYDROCHLORIDE 5 MG/1
5 TABLET ORAL EVERY 4 HOURS PRN
Status: DISCONTINUED | OUTPATIENT
Start: 2025-03-17 | End: 2025-03-20 | Stop reason: HOSPADM

## 2025-03-17 RX ORDER — SODIUM CHLORIDE 0.9 % (FLUSH) 0.9 %
10 SYRINGE (ML) INJECTION PRN
Status: DISCONTINUED | OUTPATIENT
Start: 2025-03-17 | End: 2025-03-17

## 2025-03-17 RX ORDER — ACETAMINOPHEN 500 MG
1000 TABLET ORAL ONCE
Status: COMPLETED | OUTPATIENT
Start: 2025-03-17 | End: 2025-03-17

## 2025-03-17 RX ORDER — LANOLIN
CREAM (ML) TOPICAL
Status: DISCONTINUED | OUTPATIENT
Start: 2025-03-17 | End: 2025-03-20 | Stop reason: HOSPADM

## 2025-03-17 RX ORDER — SCOPOLAMINE 1 MG/3D
1 PATCH, EXTENDED RELEASE TRANSDERMAL
Status: DISCONTINUED | OUTPATIENT
Start: 2025-03-17 | End: 2025-03-20 | Stop reason: HOSPADM

## 2025-03-17 RX ORDER — ONDANSETRON 2 MG/ML
INJECTION INTRAMUSCULAR; INTRAVENOUS
Status: DISCONTINUED | OUTPATIENT
Start: 2025-03-17 | End: 2025-03-17 | Stop reason: SDUPTHER

## 2025-03-17 RX ORDER — LANOLIN ALCOHOL/MO/W.PET/CERES
400 CREAM (GRAM) TOPICAL ONCE
Status: DISCONTINUED | OUTPATIENT
Start: 2025-03-17 | End: 2025-03-17

## 2025-03-17 RX ORDER — BUSPIRONE HYDROCHLORIDE 10 MG/1
10 TABLET ORAL 3 TIMES DAILY
Status: DISCONTINUED | OUTPATIENT
Start: 2025-03-17 | End: 2025-03-20 | Stop reason: HOSPADM

## 2025-03-17 RX ORDER — PHENYLEPHRINE HCL IN 0.9% NACL 1 MG/10 ML
SYRINGE (ML) INTRAVENOUS
Status: DISCONTINUED | OUTPATIENT
Start: 2025-03-17 | End: 2025-03-17 | Stop reason: SDUPTHER

## 2025-03-17 RX ORDER — SENNA AND DOCUSATE SODIUM 50; 8.6 MG/1; MG/1
1 TABLET, FILM COATED ORAL DAILY
Status: DISCONTINUED | OUTPATIENT
Start: 2025-03-17 | End: 2025-03-17

## 2025-03-17 RX ORDER — MORPHINE SULFATE 1 MG/ML
INJECTION, SOLUTION EPIDURAL; INTRATHECAL; INTRAVENOUS
Status: DISCONTINUED | OUTPATIENT
Start: 2025-03-17 | End: 2025-03-17 | Stop reason: SDUPTHER

## 2025-03-17 RX ORDER — BUPIVACAINE HYDROCHLORIDE 7.5 MG/ML
INJECTION, SOLUTION INTRASPINAL
Status: DISCONTINUED | OUTPATIENT
Start: 2025-03-17 | End: 2025-03-17 | Stop reason: SDUPTHER

## 2025-03-17 RX ORDER — CEPHALEXIN 500 MG/1
500 CAPSULE ORAL EVERY 8 HOURS SCHEDULED
Status: COMPLETED | OUTPATIENT
Start: 2025-03-18 | End: 2025-03-19

## 2025-03-17 RX ORDER — ONDANSETRON 2 MG/ML
4 INJECTION INTRAMUSCULAR; INTRAVENOUS EVERY 6 HOURS PRN
Status: DISCONTINUED | OUTPATIENT
Start: 2025-03-17 | End: 2025-03-17

## 2025-03-17 RX ORDER — SODIUM CHLORIDE, SODIUM LACTATE, POTASSIUM CHLORIDE, AND CALCIUM CHLORIDE .6; .31; .03; .02 G/100ML; G/100ML; G/100ML; G/100ML
1000 INJECTION, SOLUTION INTRAVENOUS ONCE
Status: COMPLETED | OUTPATIENT
Start: 2025-03-17 | End: 2025-03-17

## 2025-03-17 RX ORDER — ENOXAPARIN SODIUM 100 MG/ML
40 INJECTION SUBCUTANEOUS DAILY
Status: DISCONTINUED | OUTPATIENT
Start: 2025-03-18 | End: 2025-03-20 | Stop reason: HOSPADM

## 2025-03-17 RX ORDER — IBUPROFEN 600 MG/1
600 TABLET, FILM COATED ORAL EVERY 6 HOURS
Status: DISCONTINUED | OUTPATIENT
Start: 2025-03-18 | End: 2025-03-20 | Stop reason: HOSPADM

## 2025-03-17 RX ORDER — SODIUM CHLORIDE, SODIUM LACTATE, POTASSIUM CHLORIDE, CALCIUM CHLORIDE 600; 310; 30; 20 MG/100ML; MG/100ML; MG/100ML; MG/100ML
INJECTION, SOLUTION INTRAVENOUS CONTINUOUS
Status: DISCONTINUED | OUTPATIENT
Start: 2025-03-17 | End: 2025-03-17

## 2025-03-17 RX ORDER — ACETAMINOPHEN 500 MG
1000 TABLET ORAL EVERY 6 HOURS
Status: DISCONTINUED | OUTPATIENT
Start: 2025-03-17 | End: 2025-03-20 | Stop reason: HOSPADM

## 2025-03-17 RX ORDER — SENNA AND DOCUSATE SODIUM 50; 8.6 MG/1; MG/1
1 TABLET, FILM COATED ORAL 2 TIMES DAILY
Status: DISCONTINUED | OUTPATIENT
Start: 2025-03-18 | End: 2025-03-20 | Stop reason: HOSPADM

## 2025-03-17 RX ORDER — SODIUM CHLORIDE 9 MG/ML
INJECTION, SOLUTION INTRAVENOUS PRN
Status: DISCONTINUED | OUTPATIENT
Start: 2025-03-17 | End: 2025-03-20 | Stop reason: HOSPADM

## 2025-03-17 RX ORDER — IBUPROFEN 600 MG/1
600 TABLET, FILM COATED ORAL EVERY 6 HOURS PRN
Qty: 120 TABLET | Refills: 1 | Status: SHIPPED | OUTPATIENT
Start: 2025-03-17

## 2025-03-17 RX ORDER — OXYCODONE HYDROCHLORIDE 5 MG/1
10 TABLET ORAL EVERY 4 HOURS PRN
Status: DISCONTINUED | OUTPATIENT
Start: 2025-03-17 | End: 2025-03-20 | Stop reason: HOSPADM

## 2025-03-17 RX ORDER — MONTELUKAST SODIUM 10 MG/1
10 TABLET ORAL NIGHTLY
Status: DISCONTINUED | OUTPATIENT
Start: 2025-03-17 | End: 2025-03-20 | Stop reason: HOSPADM

## 2025-03-17 RX ORDER — SODIUM CHLORIDE 9 MG/ML
INJECTION, SOLUTION INTRAVENOUS PRN
Status: DISCONTINUED | OUTPATIENT
Start: 2025-03-17 | End: 2025-03-17

## 2025-03-17 RX ORDER — METRONIDAZOLE 500 MG/1
500 TABLET ORAL EVERY 8 HOURS SCHEDULED
Status: COMPLETED | OUTPATIENT
Start: 2025-03-17 | End: 2025-03-19

## 2025-03-17 RX ORDER — ONDANSETRON 2 MG/ML
4 INJECTION INTRAMUSCULAR; INTRAVENOUS EVERY 6 HOURS PRN
Status: DISCONTINUED | OUTPATIENT
Start: 2025-03-17 | End: 2025-03-20 | Stop reason: HOSPADM

## 2025-03-17 RX ORDER — SODIUM CHLORIDE 0.9 % (FLUSH) 0.9 %
5-40 SYRINGE (ML) INJECTION EVERY 12 HOURS SCHEDULED
Status: DISCONTINUED | OUTPATIENT
Start: 2025-03-17 | End: 2025-03-20 | Stop reason: HOSPADM

## 2025-03-17 RX ORDER — OXYCODONE HYDROCHLORIDE 5 MG/1
5 TABLET ORAL EVERY 6 HOURS PRN
Qty: 15 TABLET | Refills: 0 | Status: SHIPPED | OUTPATIENT
Start: 2025-03-17 | End: 2025-03-24

## 2025-03-17 RX ADMIN — MONTELUKAST 10 MG: 10 TABLET, FILM COATED ORAL at 21:41

## 2025-03-17 RX ADMIN — Medication 2000 MG: at 23:14

## 2025-03-17 RX ADMIN — SODIUM CHLORIDE, PRESERVATIVE FREE 10 ML: 5 INJECTION INTRAVENOUS at 21:41

## 2025-03-17 RX ADMIN — KETOROLAC TROMETHAMINE 30 MG: 30 INJECTION, SOLUTION INTRAMUSCULAR at 23:13

## 2025-03-17 RX ADMIN — Medication 909 ML/HR: at 15:18

## 2025-03-17 RX ADMIN — Medication 100 MCG: at 15:07

## 2025-03-17 RX ADMIN — SODIUM CHLORIDE, POTASSIUM CHLORIDE, SODIUM LACTATE AND CALCIUM CHLORIDE: 600; 310; 30; 20 INJECTION, SOLUTION INTRAVENOUS at 15:29

## 2025-03-17 RX ADMIN — BUSPIRONE HYDROCHLORIDE 10 MG: 10 TABLET ORAL at 21:41

## 2025-03-17 RX ADMIN — KETOROLAC TROMETHAMINE 30 MG: 30 INJECTION, SOLUTION INTRAMUSCULAR at 16:36

## 2025-03-17 RX ADMIN — SODIUM CITRATE AND CITRIC ACID MONOHYDRATE 30 ML: 500; 334 SOLUTION ORAL at 14:16

## 2025-03-17 RX ADMIN — TRANEXAMIC ACID 1000 MG: 10 INJECTION, SOLUTION INTRAVENOUS at 14:16

## 2025-03-17 RX ADMIN — PHENYLEPHRINE HYDROCHLORIDE 25 MCG/MIN: 10 INJECTION INTRAVENOUS at 14:53

## 2025-03-17 RX ADMIN — ONDANSETRON 4 MG: 2 INJECTION INTRAMUSCULAR; INTRAVENOUS at 14:58

## 2025-03-17 RX ADMIN — ACETAMINOPHEN 1000 MG: 500 TABLET ORAL at 14:16

## 2025-03-17 RX ADMIN — OXYCODONE 10 MG: 5 TABLET ORAL at 17:49

## 2025-03-17 RX ADMIN — MORPHINE SULFATE 0.2 MG: 1 INJECTION, SOLUTION EPIDURAL; INTRATHECAL; INTRAVENOUS at 14:50

## 2025-03-17 RX ADMIN — METRONIDAZOLE 500 MG: 500 TABLET ORAL at 17:49

## 2025-03-17 RX ADMIN — SODIUM CHLORIDE, SODIUM LACTATE, POTASSIUM CHLORIDE, AND CALCIUM CHLORIDE 1000 ML: .6; .31; .03; .02 INJECTION, SOLUTION INTRAVENOUS at 12:48

## 2025-03-17 RX ADMIN — BUPIVACAINE HYDROCHLORIDE IN DEXTROSE 1.6 ML: 7.5 INJECTION, SOLUTION SUBARACHNOID at 14:50

## 2025-03-17 RX ADMIN — Medication 2000 MG: at 14:42

## 2025-03-17 RX ADMIN — SODIUM CHLORIDE, POTASSIUM CHLORIDE, SODIUM LACTATE AND CALCIUM CHLORIDE: 600; 310; 30; 20 INJECTION, SOLUTION INTRAVENOUS at 14:53

## 2025-03-17 RX ADMIN — SODIUM CHLORIDE, PRESERVATIVE FREE 20 MG: 5 INJECTION INTRAVENOUS at 14:14

## 2025-03-17 RX ADMIN — ACETAMINOPHEN 1000 MG: 500 TABLET ORAL at 21:39

## 2025-03-17 ASSESSMENT — PAIN SCALES - GENERAL
PAINLEVEL_OUTOF10: 4
PAINLEVEL_OUTOF10: 3
PAINLEVEL_OUTOF10: 4

## 2025-03-17 ASSESSMENT — PAIN DESCRIPTION - DESCRIPTORS
DESCRIPTORS: CRAMPING;SORE;TENDER
DESCRIPTORS: CRAMPING;SORE;TENDER
DESCRIPTORS: ACHING
DESCRIPTORS: ACHING

## 2025-03-17 ASSESSMENT — PAIN DESCRIPTION - ORIENTATION
ORIENTATION: LOWER;MID
ORIENTATION: MID;LOWER

## 2025-03-17 ASSESSMENT — PAIN DESCRIPTION - LOCATION
LOCATION: ABDOMEN
LOCATION: ABDOMEN;INCISION

## 2025-03-17 ASSESSMENT — PAIN - FUNCTIONAL ASSESSMENT
PAIN_FUNCTIONAL_ASSESSMENT: ACTIVITIES ARE NOT PREVENTED
PAIN_FUNCTIONAL_ASSESSMENT: ACTIVITIES ARE NOT PREVENTED

## 2025-03-17 NOTE — ANESTHESIA PROCEDURE NOTES
Spinal Block    Patient location during procedure: OR  End time: 3/17/2025 2:50 PM  Reason for block: primary anesthetic  Staffing  Performed: resident/CRNA   Anesthesiologist: Melonie Caldwell MD  Resident/CRNA: Berta Hou APRN - CRNA  Performed by: Berta Hou APRN - CRNA  Authorized by: Melonie Caldwell MD    Spinal Block  Patient position: sitting  Prep: Betadine and site prepped and draped  Patient monitoring: continuous pulse ox and frequent blood pressure checks  Approach: midline  Location: L3/L4  Provider prep: mask and sterile gloves  Local infiltration: lidocaine  Needle  Needle type: Pencan   Needle gauge: 24 G  Needle length: 4 in  Assessment  Sensory level: T4  Swirl obtained: Yes  CSF: clear  Attempts: 1  Hemodynamics: stable  Preanesthetic Checklist  Completed: patient identified, IV checked, site marked, risks and benefits discussed, surgical/procedural consents, equipment checked, pre-op evaluation, timeout performed, anesthesia consent given, oxygen available, monitors applied/VS acknowledged, fire risk safety assessment completed and verbalized and blood product R/B/A discussed and consented

## 2025-03-17 NOTE — DISCHARGE SUMMARY
patient was counseled on secondary smoke risks and the increased risk of sudden infant death syndrome and respiratory problems to her baby with exposure. She was counseled on various alternate recommendations to decrease the exposure to secondary smoke to her children.

## 2025-03-17 NOTE — FLOWSHEET NOTE
Patient sent from office d/t 4/8 BPP. Patient to be primary c/s. No bleeding, LOF or ctx. +fm per patient EFM applied with FHTs 158, abdomen palpating soft.

## 2025-03-17 NOTE — ANESTHESIA PRE PROCEDURE
Department of Anesthesiology  Preprocedure Note       Name:  Ina Gaspar   Age:  24 y.o.  :  2000                                          MRN:  [id number]         Date:  3/17/2025      Surgeon: Surgeon(s):  Gama Cooper DO    Procedure: Procedure(s):   SECTION    Medications prior to admission:   Prior to Admission medications    Medication Sig Start Date End Date Taking? Authorizing Provider   acetaminophen (TYLENOL) 500 MG tablet Take 2 tablets by mouth every 6 hours as needed for Pain 3/3/25   Radha Mcgregor APRN - CNM   metoclopramide (REGLAN) 10 MG tablet Take 1 tablet by mouth 3 times daily as needed (headache) 3/3/25   Radha Mcgregor APRN - CNADAMA   famotidine (PEPCID) 20 MG tablet Take 1 tablet by mouth 2 times daily 25   Lulu Denny APRN - CNP   Ferrous Sulfate (IRON) 28 MG TABS Take 325 mg by mouth every other day 24   Rani Waite DO   busPIRone (BUSPAR) 10 MG tablet Take 1 tablet by mouth 3 times daily 24   Rani Waite DO   albuterol (PROVENTIL) (2.5 MG/3ML) 0.083% nebulizer solution Take 3 mLs by nebulization every 6 hours as needed for Wheezing 24   Rani Waite DO   montelukast (SINGULAIR) 10 MG tablet TAKE ONE TABLET BY MOUTH ONCE NIGHTLY 24   Rani Waite DO   magnesium oxide (MAG-OX) 400 (240 Mg) MG tablet Take 1 tablet by mouth daily 24   Rani Waite DO   ASPIRIN LOW DOSE 81 MG EC tablet TAKE 1 TABLET BY MOUTH DAILY 24   Arpita Ortiz PA-C       Current medications:    Current Facility-Administered Medications   Medication Dose Route Frequency Provider Last Rate Last Admin    lactated ringers infusion   IntraVENous Continuous Barbie Malave MD        sodium chloride flush 0.9 % injection 5-40 mL  5-40 mL IntraVENous 2 times per day Barbie Malave MD        sodium chloride flush 0.9 % injection 10 mL  10 mL IntraVENous PRN Barbie Malave MD        0.9 %

## 2025-03-17 NOTE — OP NOTE
pre-operatively. The risks, benefits, complications, treatment options, and expected outcomes were discussed with the patient. The patient concurred with the proposed plan, giving informed consent. The patient was taken to the Operating Room, identified as Ina Gaspar and the procedure verified as  Delivery. A Time Out was held and the above information confirmed.     After spinal anesthesia, the patient was draped and prepped in the usual sterile manner. A Pfannenstiel incision was made and carried down through the subcutaneous tissue to the fascia using scalpel. Fascial incision was made and extended transversely using tong scissors for sharp dissection. The fascia was  from the underlying rectus tissue superiorly and inferiorly using bovie electrocautery and blunt dissection. The peritoneum was identified and entered bluntly. Peritoneal incision was extended longitudinally with blunt stretch, bladder retractor was placed.  A low transverse uterine incision was made using a new scalpel blade. Blunt stretch on the hysterotomy incision was made and the amniotomy was performed revealing clear fluid.     Delivered from cephalic presentation was a Live Born male infant. The infant was suctioned, dried and the umbilical cord was clamped and cut after thirty seconds delayed cord clamping. The infant was taken to the warmer and attended by NICU for evaluation. A second section of cord was clamped and cut and sent for gases. Cord blood was obtained for evaluation. The placenta was removed spontaneously with gentle traction and appeared intact, whole, and that the umbilical cord had three vessels noted. Pitocin was started. The uterine outline, bilateral tubes and ovaries appeared normal. The uterus was cleaned of all clots and debris. The uterine incision was closed with running locked sutures of 0 Vicryl x2 starting from each apex to the midline.  Hemostasis was observed. Bilateral abdominal

## 2025-03-17 NOTE — ANESTHESIA POSTPROCEDURE EVALUATION
Department of Anesthesiology  Postprocedure Note    Patient: Ina Gaspar  MRN: [id number]  YOB: 2000  Date of evaluation: 3/17/2025    Procedure Summary       Date: 25 Room / Location: [facility] OR [address] [place] [address]    Anesthesia Start: 1445 Anesthesia Stop: 1602    Procedure:  SECTION Diagnosis:       Delivery by elective  section      (Delivery by elective  section [O82])    Surgeons: Gama Cooper DO Responsible Provider: Melonie Caldwell MD    Anesthesia Type: spinal ASA Status: 2            Anesthesia Type: No value filed.    Mary Phase I: Mary Score: 9    Mary Phase II:      Anesthesia Post Evaluation    Patient location during evaluation: PACU  Patient participation: complete - patient participated  Level of consciousness: awake and alert  Pain score: 2  Airway patency: patent  Nausea & Vomiting: no nausea and no vomiting  Cardiovascular status: hemodynamically stable  Respiratory status: acceptable  Hydration status: euvolemic  Pain management: adequate    No notable events documented.

## 2025-03-17 NOTE — CARE COORDINATION
ANTEPARTUM NOTE    Delivery by elective  section [O82]  37 weeks gestation of pregnancy [Z3A.37]    Ina was admitted to L&D on 3/17/25 for a    BPP   @ 37W 1D    OB GYN Provider: Dr. Cooper    Will meet with patient after delivery to verify name/address/phone/insurance and discuss discharge planning.     Anticipate DC home 2 nights after vaginal delivery or 4 nights after C/S delivery as long as hemodynamically stable.

## 2025-03-17 NOTE — BRIEF OP NOTE
Department of Obstetrics and Gynecology  Obstetrical Brief Operative Report  [facility]    Patient: Ina Gaspar   : 2000  MRN: [id number]       Acct: [id number]   Date of Procedure: 3/17/25    Pre-operative Diagnosis: 24 y.o. female  at 37w1d   Primary  section secondary to 4/8 BPP and maternal request  Chronic hypertension (no meds)  History of left internal carotid artery aneurysm with dissection  History of left internal carotid artery stent and coiling  Chronic migraines  Asthma  Anemia  Irritable bowel syndrome  Obstructive sleep apnea  Anxiety  BMI 36    Post-operative Diagnosis:   [place] living infant male  Primary  section secondary to 4/8 BPP and maternal request  Chronic hypertension (no meds)  History of left internal carotid artery aneurysm with dissection  History of left internal carotid artery stent and coiling  Chronic migraines  Asthma  Anemia  Irritable bowel syndrome  Obstructive sleep apnea  Anxiety  BMI 36    Procedure: primary low transverse  section    Surgeon: Dr. Cooper   Assistant(s): Barbie Malave MD, PGY1    Anesthesia: spinal with Duramorph    Information for the patient's :  Willian Gaspar [[id number]]   male   Birth Weight: 2.715 kg (5 lb 15.8 oz)  Information for the patient's :  Willian Gaspar [[id number]]        Findings:  Live Born 5 lb 15 oz male infant in cephalic presentation with Apgars of 8 at 1 minute and 9 at five minutes, normal appearing uterus tubes and ovaries   Estimated Blood Loss: pending   Total IV Fluids: 1500mL  Urine output: 150mL clear urine   Drains:  toney catheter  Specimens:  cord blood and cord gases  Instrument and Sponge Count: Correct  Complications: none  Condition: Infant stable, transfer to General Care Nursery, Mother stable, transfer to post anesthesia recovery    See operative report for full details.    Barbie Malave MD  Ob/Gyn Resident  3/17/2025,

## 2025-03-17 NOTE — H&P
OBSTETRICAL HISTORY AND PHYSICAL  [facility]    Date: 3/17/2025       Time: 12:07 PM   Patient Name: Ina Gaspar     Patient : 2000  Room/Bed: REC3/REC3-01    Admission Date/Time: 3/17/2025 11:38 AM      CC: Primary C/S 2/2 4/8 BPP & Maternal Request     HPI: Ina Gaspar is a 24 y.o.  at 37w1d who presents for a Primary C/S 2/2 4/8 BPP & Maternal Request . Patient was seen at primary OB/GYN office today where a BPP done was  (off for tone and breathing). Patient is currently full term so recommendation is for delivery at this time.     Patient has a history of ICA aneurysm. Patient was evaluated for chronic migraines which prompted brain imaging in . CTH showed increased attenuation within the prepontine cistern suspicious for hemorrhage.  Vessel imaging showed concern for tortuous L ICA terminus vs partially calcified aneurysm in the L ICAT.  She underwent diagnostic cerebral angiogram which showed tortuous and dysplastic left communicating ICA blister aneurysm that was treated with a surpass evolve stent. There was jailing of the L REGINA.  She had a follow up DSA at the 6 month cedric which was stable. Last CTA head on 2023 was stable     Patient had an appointment with neurology on 10/10/24 where they cleared her for a vaginal delivery. R/B/A to proceeding with vaginal delivery vs CS were thoroughly discussed with patient at primary OBGYN office and patient desires to proceed with a Primary CS at this time 2/2 maternal request.    The patient reports fetal movement is present, denies contractions, denies loss of fluid, denies vaginal bleeding.  She denies HA, vision changes, SOB, chest pain, lightheadedness, dizziness, nausea, vomiting, dysuria, and hematuria.     DATING:  LMP: Patient's last menstrual period was 2024 (approximate).  Estimated Date of Delivery: 25   Based on: LMP    PREGNANCY RISK FACTORS:  Patient Active Problem List

## 2025-03-17 NOTE — FLOWSHEET NOTE
Patient transferred to room 737 per bed with infant in arms. Report given to Boni CARDONA, face to face at bedside. Fundus and bleeding assessed prior to transfer.

## 2025-03-18 LAB
BASOPHILS # BLD: 0.03 K/UL (ref 0–0.2)
BASOPHILS NFR BLD: 0 % (ref 0–2)
EOSINOPHIL # BLD: 0.13 K/UL (ref 0–0.44)
EOSINOPHILS RELATIVE PERCENT: 1 % (ref 1–4)
ERYTHROCYTE [DISTWIDTH] IN BLOOD BY AUTOMATED COUNT: 16.3 % (ref 11.8–14.4)
HCT VFR BLD AUTO: 29.8 % (ref 36.3–47.1)
HGB BLD-MCNC: 9.5 G/DL (ref 11.9–15.1)
IMM GRANULOCYTES # BLD AUTO: 0.08 K/UL (ref 0–0.3)
IMM GRANULOCYTES NFR BLD: 1 %
LYMPHOCYTES NFR BLD: 1.99 K/UL (ref 1.1–3.7)
LYMPHOCYTES RELATIVE PERCENT: 20 % (ref 24–43)
MCH RBC QN AUTO: 28.3 PG (ref 25.2–33.5)
MCHC RBC AUTO-ENTMCNC: 31.9 G/DL (ref 28.4–34.8)
MCV RBC AUTO: 88.7 FL (ref 82.6–102.9)
MONOCYTES NFR BLD: 0.56 K/UL (ref 0.1–1.2)
MONOCYTES NFR BLD: 6 % (ref 3–12)
NEUTROPHILS NFR BLD: 72 % (ref 36–65)
NEUTS SEG NFR BLD: 7.22 K/UL (ref 1.5–8.1)
NRBC BLD-RTO: 0 PER 100 WBC
PLATELET # BLD AUTO: 164 K/UL (ref 138–453)
PMV BLD AUTO: 11.6 FL (ref 8.1–13.5)
RBC # BLD AUTO: 3.36 M/UL (ref 3.95–5.11)
RBC # BLD: ABNORMAL 10*6/UL
WBC OTHER # BLD: 10 K/UL (ref 3.5–11.3)

## 2025-03-18 PROCEDURE — 36415 COLL VENOUS BLD VENIPUNCTURE: CPT

## 2025-03-18 PROCEDURE — 1220000000 HC SEMI PRIVATE OB R&B

## 2025-03-18 PROCEDURE — 6370000000 HC RX 637 (ALT 250 FOR IP)

## 2025-03-18 PROCEDURE — 99024 POSTOP FOLLOW-UP VISIT: CPT | Performed by: OBSTETRICS & GYNECOLOGY

## 2025-03-18 PROCEDURE — 85025 COMPLETE CBC W/AUTO DIFF WBC: CPT

## 2025-03-18 PROCEDURE — 2500000003 HC RX 250 WO HCPCS

## 2025-03-18 PROCEDURE — 6360000002 HC RX W HCPCS

## 2025-03-18 RX ORDER — FERROUS SULFATE 325(65) MG
325 TABLET, DELAYED RELEASE (ENTERIC COATED) ORAL
Qty: 30 TABLET | Refills: 3 | Status: SHIPPED | OUTPATIENT
Start: 2025-03-18

## 2025-03-18 RX ADMIN — SENNOSIDES, DOCUSATE SODIUM 1 TABLET: 50; 8.6 TABLET, FILM COATED ORAL at 08:54

## 2025-03-18 RX ADMIN — CEPHALEXIN 500 MG: 500 CAPSULE ORAL at 21:24

## 2025-03-18 RX ADMIN — IBUPROFEN 600 MG: 600 TABLET ORAL at 17:40

## 2025-03-18 RX ADMIN — Medication 2000 MG: at 08:52

## 2025-03-18 RX ADMIN — KETOROLAC TROMETHAMINE 30 MG: 30 INJECTION, SOLUTION INTRAMUSCULAR at 06:48

## 2025-03-18 RX ADMIN — ACETAMINOPHEN 1000 MG: 500 TABLET ORAL at 10:36

## 2025-03-18 RX ADMIN — CEPHALEXIN 500 MG: 500 CAPSULE ORAL at 16:21

## 2025-03-18 RX ADMIN — OXYCODONE HYDROCHLORIDE 5 MG: 5 TABLET ORAL at 08:52

## 2025-03-18 RX ADMIN — ACETAMINOPHEN 1000 MG: 500 TABLET ORAL at 16:21

## 2025-03-18 RX ADMIN — SODIUM CHLORIDE, PRESERVATIVE FREE 10 ML: 5 INJECTION INTRAVENOUS at 08:52

## 2025-03-18 RX ADMIN — METRONIDAZOLE 500 MG: 500 TABLET ORAL at 01:51

## 2025-03-18 RX ADMIN — ACETAMINOPHEN 1000 MG: 500 TABLET ORAL at 04:01

## 2025-03-18 RX ADMIN — OXYCODONE 10 MG: 5 TABLET ORAL at 21:24

## 2025-03-18 RX ADMIN — MONTELUKAST 10 MG: 10 TABLET, FILM COATED ORAL at 21:24

## 2025-03-18 RX ADMIN — SENNOSIDES, DOCUSATE SODIUM 1 TABLET: 50; 8.6 TABLET, FILM COATED ORAL at 21:24

## 2025-03-18 RX ADMIN — METRONIDAZOLE 500 MG: 500 TABLET ORAL at 17:40

## 2025-03-18 RX ADMIN — ONDANSETRON 4 MG: 4 TABLET, ORALLY DISINTEGRATING ORAL at 21:10

## 2025-03-18 RX ADMIN — KETOROLAC TROMETHAMINE 30 MG: 30 INJECTION, SOLUTION INTRAMUSCULAR at 12:50

## 2025-03-18 RX ADMIN — METRONIDAZOLE 500 MG: 500 TABLET ORAL at 10:00

## 2025-03-18 RX ADMIN — METRONIDAZOLE 500 MG: 500 TABLET ORAL at 21:24

## 2025-03-18 ASSESSMENT — PAIN SCALES - GENERAL
PAINLEVEL_OUTOF10: 8
PAINLEVEL_OUTOF10: 6
PAINLEVEL_OUTOF10: 3
PAINLEVEL_OUTOF10: 6
PAINLEVEL_OUTOF10: 5

## 2025-03-18 ASSESSMENT — PAIN - FUNCTIONAL ASSESSMENT
PAIN_FUNCTIONAL_ASSESSMENT: ACTIVITIES ARE NOT PREVENTED

## 2025-03-18 ASSESSMENT — PAIN DESCRIPTION - DESCRIPTORS
DESCRIPTORS: SORE
DESCRIPTORS: SORE;CRAMPING
DESCRIPTORS: SORE;TENDER

## 2025-03-18 ASSESSMENT — PAIN DESCRIPTION - ORIENTATION
ORIENTATION: MID;LOWER
ORIENTATION: MID;LOWER

## 2025-03-18 ASSESSMENT — PAIN DESCRIPTION - LOCATION
LOCATION: INCISION
LOCATION: ABDOMEN;INCISION

## 2025-03-18 NOTE — LACTATION NOTE
Taught how to apply the nipple shield deeply on the left breast. Helped baby to latch and with much stimulation he began feeding with regular bursts of sucking noted. Encouraged to attempt to keep baby feeding for 10 minutes or more. If not, encouraged her to give the baby the 5 mls of colostrum she had pumped.

## 2025-03-18 NOTE — CARE COORDINATION
Social Work     Sw reviewed medical record (current active problem list) and tox screens and found no current concerns.     Sw spoke with mom and dad briefly to explain Sw role, inquire if any needs or concerns, and provide safe sleep education and discuss.  Mom denied any needs or questions and informs baby has a safe sleep environment (crib, bass, pnp).     Mom denied any current s/s of anxiety or depression and is aware to reach out to OB if any s/s occur after dc.     Mom reports a really good support system with both sides of family and grandparents and denied any current questions or needs.      Mom reports this is her 1st baby.       Mom states ped will be [facility].      Sw encouraged parents to reach out if any issues or concerns arise.

## 2025-03-18 NOTE — LACTATION NOTE
Attempted to wake baby for a feeding. He was to sleepy to latch. Mom to hold the baby skin to skin for a half an hour and attempt a feeding again. If baby doesn't latch, mom will give a bottle of her expressed colostrum.

## 2025-03-18 NOTE — CONSULTS
Mom reports her right nipple is sore. Baby licking at breast, so mom pumped 9 mls and bottled it to the baby. Packet of breastfeeding information given. Reviewed feeding patterns after a circumcision. Encouraged her to call out for assistance when baby wakes for a feeding. Reviewed use of lanolin and the gel pads to ease nipple discomfort.

## 2025-03-18 NOTE — FLOWSHEET NOTE
Breast pump set up and mother educated on pumping breasts, the equipment and the cleaning process.   Encouraged to feed or pump every 2-3 hours. Mother accepting and reassurance expressed.

## 2025-03-18 NOTE — CARE COORDINATION
25  [facility]  Clinical Case Management Initial Discharge Planning   Written by: LINETTE CHAPARRO RN    Patient Name: Ina Gaspar  Attending Provider: Gama Cooper DO      Admit Date: 3/17/2025  MRN: [id number]                           : 2000    Patient Visit Status: Inpatient    OB: Dr. Cooper    Writer met with Ina Gaspar at bedside and verified address, phone number and emergency contacts all correct.     Date of Delivery:  3/17/25 @ 1517    Delivery Type: C/S  Sex of : Male  Infant to WIN: Yes    Who lives in the home:  Fabiola  Barrier with transportation Home: No  Barrier with transportation to MD appointments: No  Ability to pay for medication(s) at discharge: Yes  DME: BP cuff, finger pulse oximeter  HC: None    Insurance: MMO confirmed with Ina Gaspar and notified  must be added to insurance within 30 days of birth. ( Ina Gaspar verbalized understanding)     Name on Birth Certificate: Irene  Pediatrician: Tank Morfin DC home of couplet in private vehicle in 2-4 days status post C/S.    Case Management Services Information Letter provided in patient's post partum admission folder.    [facility] RISK OF UNPLANNED READMISSION 2.0             8.7 Total Score

## 2025-03-19 PROCEDURE — 6360000002 HC RX W HCPCS

## 2025-03-19 PROCEDURE — 1220000000 HC SEMI PRIVATE OB R&B

## 2025-03-19 PROCEDURE — 6370000000 HC RX 637 (ALT 250 FOR IP)

## 2025-03-19 PROCEDURE — 99024 POSTOP FOLLOW-UP VISIT: CPT | Performed by: STUDENT IN AN ORGANIZED HEALTH CARE EDUCATION/TRAINING PROGRAM

## 2025-03-19 RX ORDER — PROCHLORPERAZINE MALEATE 10 MG
5 TABLET ORAL ONCE
Status: COMPLETED | OUTPATIENT
Start: 2025-03-19 | End: 2025-03-19

## 2025-03-19 RX ORDER — GABAPENTIN 100 MG/1
100 CAPSULE ORAL 3 TIMES DAILY PRN
Status: DISCONTINUED | OUTPATIENT
Start: 2025-03-19 | End: 2025-03-20 | Stop reason: HOSPADM

## 2025-03-19 RX ORDER — PROCHLORPERAZINE EDISYLATE 5 MG/ML
10 INJECTION INTRAMUSCULAR; INTRAVENOUS ONCE
Status: DISCONTINUED | OUTPATIENT
Start: 2025-03-19 | End: 2025-03-19

## 2025-03-19 RX ORDER — CYCLOBENZAPRINE HCL 10 MG
10 TABLET ORAL 3 TIMES DAILY PRN
Status: DISCONTINUED | OUTPATIENT
Start: 2025-03-19 | End: 2025-03-20 | Stop reason: HOSPADM

## 2025-03-19 RX ADMIN — SENNOSIDES, DOCUSATE SODIUM 1 TABLET: 50; 8.6 TABLET, FILM COATED ORAL at 21:43

## 2025-03-19 RX ADMIN — ACETAMINOPHEN 1000 MG: 500 TABLET ORAL at 12:33

## 2025-03-19 RX ADMIN — ACETAMINOPHEN 1000 MG: 500 TABLET ORAL at 06:37

## 2025-03-19 RX ADMIN — IBUPROFEN 600 MG: 600 TABLET ORAL at 12:33

## 2025-03-19 RX ADMIN — MONTELUKAST 10 MG: 10 TABLET, FILM COATED ORAL at 21:43

## 2025-03-19 RX ADMIN — OXYCODONE 10 MG: 5 TABLET ORAL at 23:01

## 2025-03-19 RX ADMIN — METRONIDAZOLE 500 MG: 500 TABLET ORAL at 06:35

## 2025-03-19 RX ADMIN — CYCLOBENZAPRINE 10 MG: 10 TABLET, FILM COATED ORAL at 21:43

## 2025-03-19 RX ADMIN — GABAPENTIN 100 MG: 100 CAPSULE ORAL at 00:30

## 2025-03-19 RX ADMIN — SENNOSIDES, DOCUSATE SODIUM 1 TABLET: 50; 8.6 TABLET, FILM COATED ORAL at 09:25

## 2025-03-19 RX ADMIN — IBUPROFEN 600 MG: 600 TABLET ORAL at 18:56

## 2025-03-19 RX ADMIN — CYCLOBENZAPRINE 10 MG: 10 TABLET, FILM COATED ORAL at 00:30

## 2025-03-19 RX ADMIN — PROCHLORPERAZINE MALEATE 5 MG: 10 TABLET ORAL at 01:06

## 2025-03-19 RX ADMIN — IBUPROFEN 600 MG: 600 TABLET ORAL at 06:36

## 2025-03-19 RX ADMIN — ACETAMINOPHEN 1000 MG: 500 TABLET ORAL at 18:57

## 2025-03-19 RX ADMIN — GABAPENTIN 100 MG: 100 CAPSULE ORAL at 21:43

## 2025-03-19 RX ADMIN — ENOXAPARIN SODIUM 40 MG: 100 INJECTION SUBCUTANEOUS at 09:24

## 2025-03-19 RX ADMIN — GABAPENTIN 100 MG: 100 CAPSULE ORAL at 12:33

## 2025-03-19 RX ADMIN — CEPHALEXIN 500 MG: 500 CAPSULE ORAL at 06:35

## 2025-03-19 RX ADMIN — OXYCODONE 10 MG: 5 TABLET ORAL at 09:27

## 2025-03-19 ASSESSMENT — PAIN - FUNCTIONAL ASSESSMENT
PAIN_FUNCTIONAL_ASSESSMENT: ACTIVITIES ARE NOT PREVENTED

## 2025-03-19 ASSESSMENT — PAIN DESCRIPTION - DESCRIPTORS
DESCRIPTORS: ACHING;SORE
DESCRIPTORS: CRAMPING;DISCOMFORT

## 2025-03-19 ASSESSMENT — PAIN DESCRIPTION - ORIENTATION
ORIENTATION: LOWER
ORIENTATION: MID
ORIENTATION: LOWER

## 2025-03-19 ASSESSMENT — PAIN DESCRIPTION - LOCATION
LOCATION: ABDOMEN
LOCATION: ABDOMEN;INCISION
LOCATION: INCISION
LOCATION: ABDOMEN
LOCATION: INCISION

## 2025-03-19 ASSESSMENT — PAIN SCALES - GENERAL
PAINLEVEL_OUTOF10: 10
PAINLEVEL_OUTOF10: 5
PAINLEVEL_OUTOF10: 2
PAINLEVEL_OUTOF10: 2
PAINLEVEL_OUTOF10: 10
PAINLEVEL_OUTOF10: 7
PAINLEVEL_OUTOF10: 4

## 2025-03-19 NOTE — LACTATION NOTE
Pt reports difficulty with breastfeeding and also no volume pumping, so she did start supplementing with formula last night because she felt baby was hungry. Reviewed tips to keep baby calm and attempting with breast, such as offering small supplement before feeding or hand express her colostrum before latching. Reviewed expectations with pumping and recommended frequency of pumping if she is not latching baby at breast or he does not feed well. Reviewed engorgement and expectations for milk volume to increase. Assisted pt with latching to left breast using nipple shield. Baby latched well in laid back position. Reviewed how to do breast compressions and keep baby actively suckling at the breast. Reviewed with pt how to use her Spectra pump and also how to properly hand express. Encouraged her to call out as needed.

## 2025-03-20 VITALS
RESPIRATION RATE: 16 BRPM | SYSTOLIC BLOOD PRESSURE: 121 MMHG | TEMPERATURE: 98 F | DIASTOLIC BLOOD PRESSURE: 78 MMHG | HEART RATE: 87 BPM | OXYGEN SATURATION: 100 %

## 2025-03-20 PROCEDURE — 6360000002 HC RX W HCPCS

## 2025-03-20 PROCEDURE — 6370000000 HC RX 637 (ALT 250 FOR IP)

## 2025-03-20 RX ORDER — DIPHENHYDRAMINE HCL 25 MG
25 TABLET ORAL ONCE
Status: DISCONTINUED | OUTPATIENT
Start: 2025-03-20 | End: 2025-03-20 | Stop reason: HOSPADM

## 2025-03-20 RX ORDER — METOCLOPRAMIDE 10 MG/1
10 TABLET ORAL ONCE
Status: COMPLETED | OUTPATIENT
Start: 2025-03-20 | End: 2025-03-20

## 2025-03-20 RX ADMIN — ACETAMINOPHEN 1000 MG: 500 TABLET ORAL at 06:22

## 2025-03-20 RX ADMIN — ENOXAPARIN SODIUM 40 MG: 100 INJECTION SUBCUTANEOUS at 09:45

## 2025-03-20 RX ADMIN — SENNOSIDES, DOCUSATE SODIUM 1 TABLET: 50; 8.6 TABLET, FILM COATED ORAL at 09:45

## 2025-03-20 RX ADMIN — IBUPROFEN 600 MG: 600 TABLET ORAL at 06:21

## 2025-03-20 RX ADMIN — GABAPENTIN 100 MG: 100 CAPSULE ORAL at 10:01

## 2025-03-20 RX ADMIN — OXYCODONE 10 MG: 5 TABLET ORAL at 14:22

## 2025-03-20 RX ADMIN — METOCLOPRAMIDE 10 MG: 10 TABLET ORAL at 04:50

## 2025-03-20 RX ADMIN — ACETAMINOPHEN 1000 MG: 500 TABLET ORAL at 13:19

## 2025-03-20 RX ADMIN — IBUPROFEN 600 MG: 600 TABLET ORAL at 13:20

## 2025-03-20 RX ADMIN — OXYCODONE HYDROCHLORIDE 5 MG: 5 TABLET ORAL at 10:01

## 2025-03-20 ASSESSMENT — PAIN SCALES - GENERAL
PAINLEVEL_OUTOF10: 5
PAINLEVEL_OUTOF10: 7
PAINLEVEL_OUTOF10: 5

## 2025-03-20 ASSESSMENT — PAIN DESCRIPTION - ORIENTATION
ORIENTATION: LOWER

## 2025-03-20 ASSESSMENT — PAIN DESCRIPTION - DESCRIPTORS
DESCRIPTORS: CRAMPING;DISCOMFORT
DESCRIPTORS: CRAMPING;DISCOMFORT
DESCRIPTORS: DISCOMFORT;CRAMPING

## 2025-03-20 ASSESSMENT — PAIN DESCRIPTION - LOCATION
LOCATION: INCISION
LOCATION: INCISION
LOCATION: ABDOMEN;INCISION
LOCATION: INCISION

## 2025-03-20 ASSESSMENT — PAIN - FUNCTIONAL ASSESSMENT
PAIN_FUNCTIONAL_ASSESSMENT: ACTIVITIES ARE NOT PREVENTED

## 2025-03-20 NOTE — LACTATION NOTE
Lactation round made. Assisted with refining position. Attempted to latch baby on right side in football without the shield. Baby unable to latch. Applied nipple shield and baby latches. Audible swallows present. Encouraged to follow up after D/C.

## 2025-03-20 NOTE — DISCHARGE INSTRUCTIONS
Follow-up with your OB doctor in 2 weeks or as specified by your physician.     Please refer to A New Beginning-Your Personal Guide to Postpartum Care book provided in your room. Please take this book home with you to refer to.    For Breastfeeding moms, you can contact our lactation specialist,  with any problems or questions you may have.  Phone number [phone]. Feel free to leave voice mail and your call will be returned as soon as possible.     DIET  Eat a well balanced diet focusing on foods high in fiber and protein.   Drink 8-10 glasses of fluids daily, especially water.  To avoid constipation you may take a mild stool softener as recommended by your doctor or midwife.  If taking narcotics, they may constipate you.    ACTIVITY  Gradually increase your activity.  Resume exercise regimen only after advise by your doctor or midwife.  Avoid lifting anything heavier than your baby or a gallon of milk for SIX weeks.   Avoid driving 1 week for vaginal delivery and 2 weeks for  section, unless otherwise instructed by physician or if taking any pain medications.  Rise slowly from a lying to sitting and then a standing position.  Climb stairs carefully.  Use caution when carrying your baby up and down the stairs.  NO SEXUAL Activity for 6 weeks or until advised by your doctor; Nothing in vagina: intercourse, tampons, or douching.    No swimming or hot tubs.  Be prepared to discuss family planning at your follow-up OB visit.   You may feel tired or have a lack of energy.  You may continue your prenatal vitamin to replenish nutrients post delivery.  Nap when baby naps to catch up on sleep.     EMOTIONS  You may feel romero, sad, teary, & overwhelmed for the first 2 weeks postpartum.  Contact your OB provider if you feel you may be showing signs of postpartum depression, or have thoughts of harming yourself or your infant.  If infant will not stop crying, contact another adult for help or place infant in their  Immediate family member

## 2025-03-20 NOTE — PLAN OF CARE
Problem: ABCDS Injury Assessment  Goal: Absence of physical injury  3/20/2025 1010 by Tari Foster RN  Outcome: Completed  3/20/2025 0008 by Kathie Felder RN  Outcome: Progressing     Problem: Postpartum  Goal: Experiences normal postpartum course  Description:  Postpartum OB-Pregnancy care plan goal which identifies if the mother is experiencing a normal postpartum course  3/20/2025 1010 by Tari Foster RN  Outcome: Completed  3/20/2025 0008 by Kathie Felder RN  Outcome: Progressing  Goal: Appropriate maternal -  bonding  Description:  Postpartum OB-Pregnancy care plan goal which identifies if the mother and  are bonding appropriately  3/20/2025 1010 by Tari Foster RN  Outcome: Completed  3/20/2025 0008 by Kathie Felder RN  Outcome: Progressing  Goal: Establishment of infant feeding pattern  Description:  Postpartum OB-Pregnancy care plan goal which identifies if the mother is establishing a feeding pattern with their   3/20/2025 1010 by Tari Foster RN  Outcome: Completed  3/20/2025 0008 by Kathie Felder RN  Outcome: Progressing  Goal: Incisions, wounds, or drain sites healing without S/S of infection  3/20/2025 1010 by Tari Foster RN  Outcome: Completed  3/20/2025 0008 by Kathie Felder RN  Outcome: Progressing     Problem: Pain  Goal: Verbalizes/displays adequate comfort level or baseline comfort level  3/20/2025 1010 by Tari Foster RN  Outcome: Completed  3/20/2025 0008 by Kathie Felder RN  Outcome: Progressing     Problem: Infection - Adult  Goal: Absence of infection at discharge  3/20/2025 1010 by Tari Foster RN  Outcome: Completed  3/20/2025 0008 by Kathie Felder RN  Outcome: Progressing  Goal: Absence of infection during hospitalization  3/20/2025 1010 by Tari Foster RN  Outcome: Completed  3/20/2025 0008 by Kathie Felder RN  Outcome: Progressing  Goal: Absence of fever/infection during anticipated neutropenic period  3/20/2025 1010 
  Problem: ABCDS Injury Assessment  Goal: Absence of physical injury  Outcome: Progressing     Problem: Postpartum  Goal: Experiences normal postpartum course  Description:  Postpartum OB-Pregnancy care plan goal which identifies if the mother is experiencing a normal postpartum course  Outcome: Progressing  Goal: Appropriate maternal -  bonding  Description:  Postpartum OB-Pregnancy care plan goal which identifies if the mother and  are bonding appropriately  Outcome: Progressing  Goal: Establishment of infant feeding pattern  Description:  Postpartum OB-Pregnancy care plan goal which identifies if the mother is establishing a feeding pattern with their   Outcome: Progressing  Goal: Incisions, wounds, or drain sites healing without S/S of infection  Outcome: Progressing     Problem: Pain  Goal: Verbalizes/displays adequate comfort level or baseline comfort level  Outcome: Progressing     Problem: Infection - Adult  Goal: Absence of infection at discharge  Outcome: Progressing  Goal: Absence of infection during hospitalization  Outcome: Progressing  Goal: Absence of fever/infection during anticipated neutropenic period  Outcome: Progressing     Problem: Safety - Adult  Goal: Free from fall injury  Outcome: Progressing     Problem: Discharge Planning  Goal: Discharge to home or other facility with appropriate resources  Outcome: Progressing     Problem: Chronic Conditions and Co-morbidities  Goal: Patient's chronic conditions and co-morbidity symptoms are monitored and maintained or improved  Outcome: Progressing     Problem: Risk for Maternal Injury  Goal: Remains free from seizures and injury related to seizure activity  Description: INTERVENTIONS:.  -Maintain airway, patient safety and administer oxygen as ordered  -Monitor patient for seizure activity, document and report duration and descrition  -If Seizure occurs, turn patient to dide and suction secretions as needed  -Reorient patient post 
  Problem: ABCDS Injury Assessment  Goal: Absence of physical injury  Outcome: Progressing     Problem: Vaginal Birth or  Section  Goal: Fetal and maternal status remain reassuring during the birth process  Description:  Birth OB-Pregnancy care plan goal which identifies if the fetal and maternal status remain reassuring during the birth process  Outcome: Completed     Problem: Postpartum  Goal: Experiences normal postpartum course  Description:  Postpartum OB-Pregnancy care plan goal which identifies if the mother is experiencing a normal postpartum course  Outcome: Progressing     Problem: Postpartum  Goal: Appropriate maternal -  bonding  Description:  Postpartum OB-Pregnancy care plan goal which identifies if the mother and  are bonding appropriately  Outcome: Progressing     Problem: Postpartum  Goal: Establishment of infant feeding pattern  Description:  Postpartum OB-Pregnancy care plan goal which identifies if the mother is establishing a feeding pattern with their   Outcome: Progressing     Problem: Postpartum  Goal: Incisions, wounds, or drain sites healing without S/S of infection  Outcome: Progressing     Problem: Pain  Goal: Verbalizes/displays adequate comfort level or baseline comfort level  Outcome: Progressing     Problem: Infection - Adult  Goal: Absence of infection at discharge  Outcome: Progressing     Problem: Infection - Adult  Goal: Absence of infection during hospitalization  Outcome: Progressing     Problem: Infection - Adult  Goal: Absence of fever/infection during anticipated neutropenic period  Outcome: Progressing     Problem: Safety - Adult  Goal: Free from fall injury  Outcome: Progressing     Problem: Discharge Planning  Goal: Discharge to home or other facility with appropriate resources  Outcome: Progressing     Problem: Chronic Conditions and Co-morbidities  Goal: Patient's chronic conditions and co-morbidity symptoms are monitored and maintained or 
Dianne GALAN RN  Outcome: Progressing  3/18/2025 0636 by Aaron Chatman RN  Outcome: Progressing     Problem: Chronic Conditions and Co-morbidities  Goal: Patient's chronic conditions and co-morbidity symptoms are monitored and maintained or improved  3/18/2025 1626 by Dianne Lerma RN  Outcome: Progressing  3/18/2025 0636 by Aaron Chatman RN  Outcome: Progressing     Problem: Risk for Maternal Injury  Goal: Remains free from seizures and injury related to seizure activity  3/18/2025 1626 by Dianne Lerma RN  Outcome: Progressing  3/18/2025 0636 by Aaron Chatman RN  Outcome: Progressing     Problem: Risk for Decreased Cardiac Output  Goal: Maintains optimal cardiac output and hemodynamic stability  3/18/2025 1626 by Dianne Lerma RN  Outcome: Progressing  3/18/2025 0636 by Aaron Chatman RN  Outcome: Progressing  Goal: Achieves optimal ventilation and oxygenation  3/18/2025 1626 by Dianne Lerma RN  Outcome: Progressing  3/18/2025 0636 by Aaron Chatman RN  Outcome: Progressing     Problem: Risk for Deficient Fluid Volume  Goal: Hemodynamic stability and optimal renal function maintained  3/18/2025 1626 by Dianne Lerma RN  Outcome: Progressing  3/18/2025 0636 by Aaron Chatman RN  Outcome: Progressing     
anticipated neutropenic period  3/18/2025 0636 by Aaron Chatman RN  Outcome: Progressing  3/17/2025 1831 by Jane Zamora RN  Outcome: Progressing     Problem: Safety - Adult  Goal: Free from fall injury  3/18/2025 0636 by Aaron Chatman RN  Outcome: Progressing  3/17/2025 1831 by Jane Zamora RN  Outcome: Progressing     Problem: Discharge Planning  Goal: Discharge to home or other facility with appropriate resources  3/18/2025 0636 by Aaron Chatman RN  Outcome: Progressing  3/17/2025 1831 by Jane Zamora RN  Outcome: Progressing     Problem: Chronic Conditions and Co-morbidities  Goal: Patient's chronic conditions and co-morbidity symptoms are monitored and maintained or improved  3/18/2025 0636 by Aaron Chatman RN  Outcome: Progressing  3/17/2025 1831 by Jane Zamora RN  Outcome: Progressing     Problem: Risk for Maternal Injury  Goal: Remains free from seizures and injury related to seizure activity  Description: INTERVENTIONS:.  -Maintain airway, patient safety and administer oxygen as ordered  -Monitor patient for seizure activity, document and report duration and descrition  -If Seizure occurs, turn patient to dide and suction secretions as needed  -Reorient patient post seizure  -Seizure Pads  -Instruct patient/family to notify RN of any seizure activity  -Instruct patient/family to call for assistance with activity based on assessment  3/18/2025 0636 by Aaron Chatman RN  Outcome: Progressing  3/17/2025 1831 by Jane Zamora RN  Outcome: Progressing     Problem: Risk for Decreased Cardiac Output  Goal: Maintains optimal cardiac output and hemodynamic stability  Description: INTERVENTIONS:.  -Monitor blood pressure and heart rate  -Monitor urine output and notify licensed practitioner for values outside of   -Assess for signes of decreased cardiac output  -Administer fluid and /or volume expanders as ordered    3/18/2025 0636 by Aaron Chatman RN  Outcome: 
indicated      3/19/2025 0050 by Kathie Felder, RN  Outcome: Progressing     Problem: Risk for Deficient Fluid Volume  Goal: Hemodynamic stability and optimal renal function maintained  Description: Interventions:.  -Monitor labs and assess for signs and symptoms of volume excess or deficit    -Monitor intake, output and patient weight  -Monitor response to interventions for patient's volume status, including labs, urine output, blood pressure (other measures as available)  -Fluid restriction as ordered  -Instruct patient on fluid and nutrition restrictions as appropriate      3/19/2025 0050 by Kathie Felder, RN  Outcome: Progressing

## 2025-03-20 NOTE — CARE COORDINATION
Discharge Report    [facility]  Clinical Case Management Department  Written by: Nilda Price RN    Patient Name: Ina Gaspar  Attending Provider: Gama Cooper DO  Admit Date: 3/17/2025 11:38 AM  MRN: [id number]  Account: [id number]                     : 2000  Discharge Date: 3/20/2025    Disposition: home    Nilda Price RN

## 2025-03-20 NOTE — PROGRESS NOTES
Obstetrical Rounds:    POD/PPD #: 1  Hospital Day: 2  Procedure: primary  section    Date: 3/18/2025  Time: 9:32 AM        Patient Name: Ina Gaspar  Patient : 2000  Room/Bed: 0737/0737-01  Admission Date/Time: 3/17/2025 11:38 AM  MRN #: [id number]  [facility] #: [id number]        Attending Physician Statement  I have discussed the care of Ina Gaspar, including pertinent history and exam findings,  with the resident. I have reviewed their note in the electronic medical record. I have seen and examined the patient and the key elements of all parts of the encounter have been performed/reviewed by me .  I agree with the assessment, plan and orders as documented by the resident.   Pt seen & examined. Pt without c/c. Pt ambulating & urinating without difficulty. C/w current postpartum course. + flatus  Vitals:    25 0745   BP: 115/71   Pulse: 90   Resp: 16   Temp: 98.2 °F (36.8 °C)   SpO2: 98%       Admission on 2025   Component Date Value Ref Range Status    Blood Bank Sample Expiration 2025,2359   Final    Arm Band Number 2025 [id number]   Final    ABO/Rh 2025 O POSITIVE   Final    Antibody Screen 2025 NEGATIVE   Final    WBC 2025 9.8  3.5 - 11.3 k/uL Final    RBC 2025 4.05  3.95 - 5.11 m/uL Final    Hemoglobin 2025 11.2 (L)  11.9 - 15.1 g/dL Final    Hematocrit 2025 35.3 (L)  36.3 - 47.1 % Final    MCV 2025 87.2  82.6 - 102.9 fL Final    MCH 2025 27.7  25.2 - 33.5 pg Final    MCHC 2025 31.7  28.4 - 34.8 g/dL Final    RDW 2025 16.1 (H)  11.8 - 14.4 % Final    Platelets 2025 187  138 - 453 k/uL Final    MPV 2025 11.3  8.1 - 13.5 fL Final    NRBC Automated 2025 0.0  0.0 per 100 WBC Final    T. pallidum, IgG 2025 NONREACTIVE  NONREACTIVE Final    Comment:       T. pallidum antibodies are not detected.  There is no serological evidence of infection with T. pallidum (early 
CLINICAL PHARMACY NOTE: MEDS TO BEDS    Total # of Prescriptions Filled: 5   The following medications were delivered to the patient:  IBU 600MG   TYLENOL 500MG  OXY 5MG   SENNA 8.6   IRON 325    Additional Documentation:   
non-distended, non-tender, bowel sounds present   Fundus: non-tender, firm, below umbilicus  Incision: Silver dressing in place without saturation   Extremities:  no calf tenderness, non edematous    Labs:  Lab Results   Component Value Date    WBC 10.0 2025    HGB 9.5 (L) 2025    HCT 29.8 (L) 2025    MCV 88.7 2025     2025       Assessment/Plan:  Ina Gaspar is a  POD # 3 s/p PLTCS   - Doing well, VSS    - Male infant in General Care Nursery, circumcision completed   - Encourage ambulation and use of incentive spirometer   - S/p toney catheter and saline lock IV on POD #1    - POD#1 Hgb 9.5. Rx iron sent on discharge   - Motrin/Tylenol/Neli/Flexeril/Gabapentin   - Duramorph/Toradol   - Ancef > Keflex/Flagyl   - Lovenox 40 qD    Rh positive/Rubella immune   - Rhogam/MMR not indicated    Breast feeding   - Counseled on s/sx mastitis    cHTN (no meds)  - BP normotensive    - Endorses headache but denies s/sx PreE, see below   - PreE labs wnl, P/C cnbc   - Continue to monitor closely     Migraines/Headache   - Patient states she has a 6-7/10 headache that is similar to headaches she had during pregnancy and before pregnancy   - States motrin/tylenol have not helped   - Offered reglan/benadryl x1   - Low clinical suspicion for SF at this time given chronicity of headache   - Will continue to monitor closely     Asthma   - Clinically asymptomatic   - Controlled on albuterol and Singulair    Anxiety   - Mood stable on Buspar 10mg TID   - Discussed PP depression    BMI 36    Continue post-op care.    Counseling Completed:  Secondary Smoke risks and Sudden Infant Death Syndrome were reviewed with recommendations. Infant sleeping, \"back to sleep\" and avoidance of co-sleeping recommendations were reviewed.  Signs and Symptoms of Post Partum Depression were reviewed. The patient is to call if any occur.  Signs and symptoms of Mastitis were reviewed. The patient is to call if 
MD  Ob/Gyn Resident  3/18/2025, 1:38 AM     
Mariann Flaherty MD  Ob/Gyn Resident  3/19/2025, 12:50 AM             Attending Physician Statement  I have discussed the care of Ina Gaspar, including pertinent history and exam findings,  with the resident. I have seen and examined the patient and the key elements of all parts of the encounter have been performed by me.  I agree with the assessment, plan and orders as documented by the resident.  (GC Modifier)    Rani Waite,      Patient is well-appearing and reports recovering well. Passing flatus. Spontaneously voiding. Desires discharge home tomorrow AM.  
Admitted

## 2025-03-20 NOTE — LACTATION NOTE
Lactation round made. Patient reports breastfeeding is not going well. Patient states baby is not latching and getting frustrated at the breast. Patient attempted to pump with no amounts achieved. Discussed pumping norms in first couple days. Reviewed hand expression and encouraged patient to call out at next feeding for breastfeeding assistance.

## 2025-03-20 NOTE — FLOWSHEET NOTE
Pt discharged to private residence via wheelchair in stable condition with belongings  Discharge instructions given  \"Meds To Beds\" medication at bedside  Pt denies having any further questions at this time  personal items given to patient at discharge  Patient/family state they have everything they were admitted with.  Bp cuff and hibiclens sent home with patient

## 2025-03-22 LAB — SURGICAL PATHOLOGY REPORT: NORMAL

## 2025-03-24 ENCOUNTER — CLINICAL SUPPORT (OUTPATIENT)
Dept: OBGYN CLINIC | Age: 25
End: 2025-03-24

## 2025-03-24 VITALS
SYSTOLIC BLOOD PRESSURE: 124 MMHG | DIASTOLIC BLOOD PRESSURE: 80 MMHG | HEART RATE: 75 BPM | WEIGHT: 199.4 LBS | BODY MASS INDEX: 36.47 KG/M2

## 2025-03-24 PROCEDURE — 99024 POSTOP FOLLOW-UP VISIT: CPT | Performed by: NURSE PRACTITIONER

## 2025-03-24 NOTE — PROGRESS NOTES
Patient is present for  bandage removal. Patient states 4/10. Patient shows no s/s of infection.  incision- intact, well approximated, clean no erythema increased warmth or drainage, normal induration. Incision cleaned with normal saline.     Patient was educated on incision care: cleaning area with warm soap and water daily, keeping area dry, not lifting more than 15 lbs, using pillows to prop baby when holding and/or nursing. Patient was instructed to call office with any questions.    2 week PP  6 week PP

## 2025-03-25 DIAGNOSIS — O16.9 HYPERTENSION AFFECTING PREGNANCY, ANTEPARTUM: ICD-10-CM

## 2025-03-25 DIAGNOSIS — O09.93 HIGH-RISK PREGNANCY IN THIRD TRIMESTER: ICD-10-CM

## 2025-03-31 ENCOUNTER — POSTPARTUM VISIT (OUTPATIENT)
Dept: OBGYN CLINIC | Age: 25
End: 2025-03-31

## 2025-03-31 VITALS
HEIGHT: 62 IN | WEIGHT: 191.6 LBS | DIASTOLIC BLOOD PRESSURE: 78 MMHG | BODY MASS INDEX: 35.26 KG/M2 | SYSTOLIC BLOOD PRESSURE: 112 MMHG

## 2025-03-31 DIAGNOSIS — Z98.891 S/P PRIMARY LOW TRANSVERSE C-SECTION: ICD-10-CM

## 2025-03-31 DIAGNOSIS — D64.9 ANEMIA, UNSPECIFIED TYPE: ICD-10-CM

## 2025-03-31 PROCEDURE — 99024 POSTOP FOLLOW-UP VISIT: CPT | Performed by: NURSE PRACTITIONER

## 2025-03-31 ASSESSMENT — ENCOUNTER SYMPTOMS
NAUSEA: 0
VOMITING: 0
DIARRHEA: 0
COUGH: 0
SHORTNESS OF BREATH: 0
CONSTIPATION: 0

## 2025-03-31 ASSESSMENT — ANXIETY QUESTIONNAIRES
7. FEELING AFRAID AS IF SOMETHING AWFUL MIGHT HAPPEN: SEVERAL DAYS
3. WORRYING TOO MUCH ABOUT DIFFERENT THINGS: NOT AT ALL
4. TROUBLE RELAXING: NOT AT ALL
2. NOT BEING ABLE TO STOP OR CONTROL WORRYING: SEVERAL DAYS
5. BEING SO RESTLESS THAT IT IS HARD TO SIT STILL: NOT AT ALL
IF YOU CHECKED OFF ANY PROBLEMS ON THIS QUESTIONNAIRE, HOW DIFFICULT HAVE THESE PROBLEMS MADE IT FOR YOU TO DO YOUR WORK, TAKE CARE OF THINGS AT HOME, OR GET ALONG WITH OTHER PEOPLE: SOMEWHAT DIFFICULT
GAD7 TOTAL SCORE: 4
6. BECOMING EASILY ANNOYED OR IRRITABLE: SEVERAL DAYS
1. FEELING NERVOUS, ANXIOUS, OR ON EDGE: SEVERAL DAYS

## 2025-03-31 ASSESSMENT — PATIENT HEALTH QUESTIONNAIRE - PHQ9: DEPRESSION UNABLE TO ASSESS: PT REFUSES

## 2025-03-31 NOTE — PROGRESS NOTES
[facility], [facility] [address]  [address]  [facility] [address]  Dept: [phone]  Dept Fax: [phone]    Ina Gaspar is a 24 y.o. female who presents today for her medical conditions/complaintsas noted below.  Ina Gaspar is c/o of Postpartum Care        HPI:     Ina presents for 2 week postpartum check up. Delivered  on 3/17/25  No complaints, of chest pain, S.O.B. Headaches, no abdominal pain, GI or  issues.  Breastfeeding Yes - pumping and supplementing with formula Signs mastitis No  Bleeding Yes light lochia   Birth control options abstinence until 6 weeks then wants to do an OCP    The patient completed :   E.P.D.S. Evaluation form and scored 4.   RUFINA 7 and scored 4.  Denies any  suicidal/homicidal ideations or plans or delusions or hallucinations.        Last HGB:9.5 on 3/18-taking vitamins still states has been taking oral iron but has not been taking daily.  GDM:No   gHTN or Preeclampsia: no, but  hx of cHTN    OB History    Para Term  AB Living   2 1 1 0 1 1   SAB IAB Ectopic Molar Multiple Live Births   0 0 1 0 0 1      # Outcome Date GA Lbr Minor/2nd Weight Sex Type Anes PTL Lv   2 Term 25 37w1d  2.715 kg (5 lb 15.8 oz) M CS-LTranv Spinal  JERZY   1 Ectopic                Past Medical History:   Diagnosis Date    Aneurysm of internal carotid artery     Anxiety     Asthma     Carotid aneurysm, left     cHTN (no meds) 3/17/2025    Drug effect     Headache     History of angiography 2021    CEREBRAL ANGIOGRAM    Irritable bowel syndrome with diarrhea 2024    Migraine     Moderate episode of recurrent major depressive disorder (HCC) 2019    Obstructive sleep apnea syndrome 2024      Past Surgical History:   Procedure Laterality Date    CARDIAC CATHETERIZATION  2020    no stent here in tcc.     SECTION N/A 3/17/2025     SECTION performed by

## 2025-04-28 ENCOUNTER — POSTPARTUM VISIT (OUTPATIENT)
Dept: OBGYN CLINIC | Age: 25
End: 2025-04-28

## 2025-04-28 VITALS
WEIGHT: 193 LBS | BODY MASS INDEX: 35.51 KG/M2 | SYSTOLIC BLOOD PRESSURE: 110 MMHG | DIASTOLIC BLOOD PRESSURE: 66 MMHG | HEIGHT: 62 IN

## 2025-04-28 DIAGNOSIS — N94.6 DYSMENORRHEA: Primary | ICD-10-CM

## 2025-04-28 PROCEDURE — 0503F POSTPARTUM CARE VISIT: CPT | Performed by: ADVANCED PRACTICE MIDWIFE

## 2025-04-28 ASSESSMENT — ANXIETY QUESTIONNAIRES
5. BEING SO RESTLESS THAT IT IS HARD TO SIT STILL: NOT AT ALL
3. WORRYING TOO MUCH ABOUT DIFFERENT THINGS: SEVERAL DAYS
7. FEELING AFRAID AS IF SOMETHING AWFUL MIGHT HAPPEN: SEVERAL DAYS
1. FEELING NERVOUS, ANXIOUS, OR ON EDGE: SEVERAL DAYS
4. TROUBLE RELAXING: NOT AT ALL
6. BECOMING EASILY ANNOYED OR IRRITABLE: MORE THAN HALF THE DAYS
2. NOT BEING ABLE TO STOP OR CONTROL WORRYING: SEVERAL DAYS
GAD7 TOTAL SCORE: 6

## 2025-04-28 NOTE — PROGRESS NOTES
[facility] [address]  [address]  [facility] [address]  Dept: [phone]    Patient Name: Ina Gaspar  Patient Age: 24 y.o.  Date of Visit: 2025    Chief Complaint   Patient presents with    Postpartum Care       HPI:  DELIVERY DATE: 3/17/25  TYPE OF DELIVERY: primary  section  PROVIDER:   PERINEUM: n/a  Delivery/Postpartum Complications: no    Ina was seen for her 2 week visit.  Ina Gaspar does not report any concerns today.     Her Male infant is healthy.   Infant feeding:  [] Breastfeeding w/o issues  [] Breastfeeding w/ issues   [x] Exclusively pumping - under producing having to supplement.  [] Pumping and breastfeeding  [] Formula Feeding  Infant feeding concerns: No    She is experiencing pain.  She is rating her pain 0  She reports her lochia is staining only  She reports none perineal discomfort.  She denies urinary incontinence.  Her bowels have returned to her normal pattern.  She is back to her normal activity pattern.  Has had first menses postpartum: no  Ina has not engaged in intercourse.    [place] protected: NA  She does not report a mood disorder.    She feels she is not having difficulty coping.  Ina feels her family adjustment is effective.  She reports an overall positive birth experience.    In the past 7 days:  I have been able to laugh and see the funny side of things: As much as I always could  I have looked forward with enjoyment to things: As much as I ever did  I have blamed myself unnecessarily when things went wrong: Not very often  I have been anxious or worried for no good reason: Hardly ever  I have felt scared or panicky for no good reason: No, not much  I haven't been able to cope lately: No, most of the time I have coped quite well  I have been so unhappy that I have had difficulty sleeping: Not at all  I have felt sad or miserable: No, not at all  I have

## 2025-05-02 ENCOUNTER — TELEPHONE (OUTPATIENT)
Dept: OBGYN CLINIC | Age: 25
End: 2025-05-02

## 2025-05-05 ENCOUNTER — PATIENT MESSAGE (OUTPATIENT)
Dept: OBGYN CLINIC | Age: 25
End: 2025-05-05

## 2025-05-05 DIAGNOSIS — N94.6 DYSMENORRHEA: ICD-10-CM

## 2025-05-05 NOTE — TELEPHONE ENCOUNTER
I was told that patient had wanted to try alternate hormonal contraceptive that the Slynd was not covered by her insurance we can offer to send the Slynd to [facility] pharmacy and she would get a 3-month supply delivered to her house for $50 or if she does not want to do this I can call her in [place] this is also a progesterone pill so should not affect her milk supply.  She needs to be very diligent about taking this medication the same time every day for effectiveness.    Please let me know what she would like to do.

## 2025-07-14 ENCOUNTER — PATIENT MESSAGE (OUTPATIENT)
Dept: OBGYN CLINIC | Age: 25
End: 2025-07-14